# Patient Record
Sex: FEMALE | Race: WHITE | Employment: UNEMPLOYED | URBAN - METROPOLITAN AREA
[De-identification: names, ages, dates, MRNs, and addresses within clinical notes are randomized per-mention and may not be internally consistent; named-entity substitution may affect disease eponyms.]

---

## 2017-08-07 PROBLEM — I89.0 LYMPHEDEMA OF BOTH LOWER EXTREMITIES: Status: ACTIVE | Noted: 2017-08-07

## 2017-08-07 PROBLEM — B35.9 DERMATOPHYTOSIS: Status: ACTIVE | Noted: 2017-08-07

## 2017-08-07 PROBLEM — M79.89 LEG SWELLING: Status: ACTIVE | Noted: 2017-08-07

## 2017-08-07 PROBLEM — L03.116 CELLULITIS OF LEFT LEG: Status: ACTIVE | Noted: 2017-08-07

## 2018-09-06 RX ORDER — FLUTICASONE PROPIONATE 50 MCG
2 SPRAY, SUSPENSION (ML) NASAL DAILY PRN
COMMUNITY
End: 2022-09-21

## 2018-09-06 RX ORDER — FAMOTIDINE 20 MG/1
20 TABLET, FILM COATED ORAL DAILY
COMMUNITY

## 2018-09-06 RX ORDER — PHENYTOIN SODIUM 200 MG/1
400 CAPSULE, EXTENDED RELEASE ORAL EVERY OTHER DAY
COMMUNITY
End: 2018-10-10 | Stop reason: DRUGHIGH

## 2018-09-06 RX ORDER — PHENYTOIN SODIUM 300 MG/1
CAPSULE, EXTENDED RELEASE ORAL EVERY OTHER DAY
COMMUNITY
End: 2018-10-10

## 2018-09-06 RX ORDER — GUAIFENESIN/DEXTROMETHORPHAN 100-10MG/5
5 SYRUP ORAL EVERY 4 HOURS PRN
COMMUNITY
End: 2022-09-21

## 2018-09-14 ENCOUNTER — HOSPITAL ENCOUNTER (OUTPATIENT)
Age: 73
Setting detail: OUTPATIENT SURGERY
Discharge: OTHER INSTITUTION | End: 2018-09-14
Attending: OPHTHALMOLOGY | Admitting: OPHTHALMOLOGY
Payer: MEDICARE

## 2018-09-14 ENCOUNTER — ANESTHESIA EVENT (OUTPATIENT)
Dept: OPERATING ROOM | Age: 73
End: 2018-09-14
Payer: MEDICARE

## 2018-09-14 ENCOUNTER — ANESTHESIA (OUTPATIENT)
Dept: OPERATING ROOM | Age: 73
End: 2018-09-14
Payer: MEDICARE

## 2018-09-14 VITALS
DIASTOLIC BLOOD PRESSURE: 83 MMHG | OXYGEN SATURATION: 94 % | HEIGHT: 62 IN | RESPIRATION RATE: 16 BRPM | BODY MASS INDEX: 42.14 KG/M2 | TEMPERATURE: 98.2 F | HEART RATE: 73 BPM | SYSTOLIC BLOOD PRESSURE: 159 MMHG | WEIGHT: 229 LBS

## 2018-09-14 VITALS — SYSTOLIC BLOOD PRESSURE: 182 MMHG | DIASTOLIC BLOOD PRESSURE: 77 MMHG | OXYGEN SATURATION: 100 % | TEMPERATURE: 95.9 F

## 2018-09-14 PROCEDURE — 6360000002 HC RX W HCPCS: Performed by: OPHTHALMOLOGY

## 2018-09-14 PROCEDURE — 3600000002 HC SURGERY LEVEL 2 BASE: Performed by: OPHTHALMOLOGY

## 2018-09-14 PROCEDURE — 2500000003 HC RX 250 WO HCPCS: Performed by: NURSE ANESTHETIST, CERTIFIED REGISTERED

## 2018-09-14 PROCEDURE — 7100000001 HC PACU RECOVERY - ADDTL 15 MIN: Performed by: OPHTHALMOLOGY

## 2018-09-14 PROCEDURE — 7100000000 HC PACU RECOVERY - FIRST 15 MIN: Performed by: OPHTHALMOLOGY

## 2018-09-14 PROCEDURE — 6370000000 HC RX 637 (ALT 250 FOR IP): Performed by: OPHTHALMOLOGY

## 2018-09-14 PROCEDURE — 2500000003 HC RX 250 WO HCPCS: Performed by: OPHTHALMOLOGY

## 2018-09-14 PROCEDURE — 6370000000 HC RX 637 (ALT 250 FOR IP)

## 2018-09-14 PROCEDURE — 7100000011 HC PHASE II RECOVERY - ADDTL 15 MIN: Performed by: OPHTHALMOLOGY

## 2018-09-14 PROCEDURE — 3700000000 HC ANESTHESIA ATTENDED CARE: Performed by: OPHTHALMOLOGY

## 2018-09-14 PROCEDURE — 7100000010 HC PHASE II RECOVERY - FIRST 15 MIN: Performed by: OPHTHALMOLOGY

## 2018-09-14 PROCEDURE — 2709999900 HC NON-CHARGEABLE SUPPLY: Performed by: OPHTHALMOLOGY

## 2018-09-14 PROCEDURE — 2580000003 HC RX 258: Performed by: NURSE ANESTHETIST, CERTIFIED REGISTERED

## 2018-09-14 PROCEDURE — 3600000012 HC SURGERY LEVEL 2 ADDTL 15MIN: Performed by: OPHTHALMOLOGY

## 2018-09-14 PROCEDURE — V2632 POST CHMBR INTRAOCULAR LENS: HCPCS | Performed by: OPHTHALMOLOGY

## 2018-09-14 PROCEDURE — 6360000002 HC RX W HCPCS: Performed by: NURSE ANESTHETIST, CERTIFIED REGISTERED

## 2018-09-14 PROCEDURE — 3700000001 HC ADD 15 MINUTES (ANESTHESIA): Performed by: OPHTHALMOLOGY

## 2018-09-14 DEVICE — SINGLE-PIECE PMMA STERILE PCL (IOL/PC),12.5MM LENGTH, 7.0MM BICONVEX OPTIC,5-DEGREE "SLANT"(TM*) HAPTICS WITH EYELETS.                       *REG. U.S. PAT. & TM OFF.
Type: IMPLANTABLE DEVICE | Site: EYE | Status: FUNCTIONAL
Brand: ALCON SINGLE-PIECE PMMASLANT

## 2018-09-14 RX ORDER — PROPOFOL 10 MG/ML
INJECTION, EMULSION INTRAVENOUS PRN
Status: DISCONTINUED | OUTPATIENT
Start: 2018-09-14 | End: 2018-09-14 | Stop reason: SDUPTHER

## 2018-09-14 RX ORDER — PHENYLEPHRINE HCL 2.5 %
1 DROPS OPHTHALMIC (EYE)
Status: COMPLETED | OUTPATIENT
Start: 2018-09-14 | End: 2018-09-14

## 2018-09-14 RX ORDER — KETOROLAC TROMETHAMINE 5 MG/ML
1 SOLUTION OPHTHALMIC
Status: COMPLETED | OUTPATIENT
Start: 2018-09-14 | End: 2018-09-14

## 2018-09-14 RX ORDER — PREDNISOLONE ACETATE 10 MG/ML
SUSPENSION/ DROPS OPHTHALMIC PRN
Status: DISCONTINUED | OUTPATIENT
Start: 2018-09-14 | End: 2018-09-14 | Stop reason: HOSPADM

## 2018-09-14 RX ORDER — SODIUM CHLORIDE 9 MG/ML
INJECTION, SOLUTION INTRAVENOUS CONTINUOUS PRN
Status: DISCONTINUED | OUTPATIENT
Start: 2018-09-14 | End: 2018-09-14

## 2018-09-14 RX ORDER — FENTANYL CITRATE 50 UG/ML
INJECTION, SOLUTION INTRAMUSCULAR; INTRAVENOUS PRN
Status: DISCONTINUED | OUTPATIENT
Start: 2018-09-14 | End: 2018-09-14 | Stop reason: SDUPTHER

## 2018-09-14 RX ORDER — PREDNISOLONE ACETATE 10 MG/ML
1 SUSPENSION/ DROPS OPHTHALMIC
Status: DISCONTINUED | OUTPATIENT
Start: 2018-09-14 | End: 2018-09-14 | Stop reason: HOSPADM

## 2018-09-14 RX ORDER — LIDOCAINE HYDROCHLORIDE 20 MG/ML
INJECTION, SOLUTION INFILTRATION; PERINEURAL PRN
Status: DISCONTINUED | OUTPATIENT
Start: 2018-09-14 | End: 2018-09-14 | Stop reason: SDUPTHER

## 2018-09-14 RX ORDER — CYCLOPENTOLATE HYDROCHLORIDE 10 MG/ML
1 SOLUTION/ DROPS OPHTHALMIC
Status: COMPLETED | OUTPATIENT
Start: 2018-09-14 | End: 2018-09-14

## 2018-09-14 RX ORDER — DEXAMETHASONE SODIUM PHOSPHATE 4 MG/ML
INJECTION, SOLUTION INTRA-ARTICULAR; INTRALESIONAL; INTRAMUSCULAR; INTRAVENOUS; SOFT TISSUE PRN
Status: DISCONTINUED | OUTPATIENT
Start: 2018-09-14 | End: 2018-09-14 | Stop reason: SDUPTHER

## 2018-09-14 RX ORDER — ONDANSETRON 2 MG/ML
INJECTION INTRAMUSCULAR; INTRAVENOUS PRN
Status: DISCONTINUED | OUTPATIENT
Start: 2018-09-14 | End: 2018-09-14 | Stop reason: SDUPTHER

## 2018-09-14 RX ORDER — SUCCINYLCHOLINE CHLORIDE 20 MG/ML
INJECTION INTRAMUSCULAR; INTRAVENOUS PRN
Status: DISCONTINUED | OUTPATIENT
Start: 2018-09-14 | End: 2018-09-14 | Stop reason: SDUPTHER

## 2018-09-14 RX ORDER — MOXIFLOXACIN 5 MG/ML
1 SOLUTION/ DROPS OPHTHALMIC
Status: COMPLETED | OUTPATIENT
Start: 2018-09-14 | End: 2018-09-14

## 2018-09-14 RX ORDER — BRIMONIDINE TARTRATE 2 MG/ML
1 SOLUTION/ DROPS OPHTHALMIC EVERY 8 HOURS SCHEDULED
Status: DISCONTINUED | OUTPATIENT
Start: 2018-09-14 | End: 2018-09-14 | Stop reason: HOSPADM

## 2018-09-14 RX ORDER — MIDAZOLAM HYDROCHLORIDE 1 MG/ML
INJECTION INTRAMUSCULAR; INTRAVENOUS PRN
Status: DISCONTINUED | OUTPATIENT
Start: 2018-09-14 | End: 2018-09-14 | Stop reason: SDUPTHER

## 2018-09-14 RX ORDER — BRIMONIDINE TARTRATE 2 MG/ML
SOLUTION/ DROPS OPHTHALMIC PRN
Status: DISCONTINUED | OUTPATIENT
Start: 2018-09-14 | End: 2018-09-14 | Stop reason: HOSPADM

## 2018-09-14 RX ORDER — MOXIFLOXACIN 5 MG/ML
SOLUTION/ DROPS OPHTHALMIC PRN
Status: DISCONTINUED | OUTPATIENT
Start: 2018-09-14 | End: 2018-09-14 | Stop reason: HOSPADM

## 2018-09-14 RX ORDER — SODIUM CHLORIDE 9 MG/ML
INJECTION, SOLUTION INTRAVENOUS CONTINUOUS PRN
Status: DISCONTINUED | OUTPATIENT
Start: 2018-09-14 | End: 2018-09-14 | Stop reason: SDUPTHER

## 2018-09-14 RX ORDER — ROCURONIUM BROMIDE 10 MG/ML
INJECTION, SOLUTION INTRAVENOUS PRN
Status: DISCONTINUED | OUTPATIENT
Start: 2018-09-14 | End: 2018-09-14 | Stop reason: SDUPTHER

## 2018-09-14 RX ORDER — SODIUM CHLORIDE, SODIUM LACTATE, POTASSIUM CHLORIDE, CALCIUM CHLORIDE 600; 310; 30; 20 MG/100ML; MG/100ML; MG/100ML; MG/100ML
INJECTION, SOLUTION INTRAVENOUS CONTINUOUS
Status: DISCONTINUED | OUTPATIENT
Start: 2018-09-14 | End: 2018-09-14 | Stop reason: HOSPADM

## 2018-09-14 RX ORDER — GLYCOPYRROLATE 1 MG/5 ML
SYRINGE (ML) INTRAVENOUS PRN
Status: DISCONTINUED | OUTPATIENT
Start: 2018-09-14 | End: 2018-09-14 | Stop reason: SDUPTHER

## 2018-09-14 RX ADMIN — Medication 1 DROP: at 06:40

## 2018-09-14 RX ADMIN — SODIUM CHLORIDE: 9 INJECTION, SOLUTION INTRAVENOUS at 08:15

## 2018-09-14 RX ADMIN — Medication 1 DROP: at 06:49

## 2018-09-14 RX ADMIN — Medication 1 DROP: at 06:54

## 2018-09-14 RX ADMIN — FENTANYL CITRATE 25 MCG: 50 INJECTION, SOLUTION INTRAMUSCULAR; INTRAVENOUS at 07:47

## 2018-09-14 RX ADMIN — MIDAZOLAM HYDROCHLORIDE 1 MG: 1 INJECTION, SOLUTION INTRAMUSCULAR; INTRAVENOUS at 07:36

## 2018-09-14 RX ADMIN — SODIUM CHLORIDE: 9 INJECTION, SOLUTION INTRAVENOUS at 07:36

## 2018-09-14 RX ADMIN — PHENYLEPHRINE HYDROCHLORIDE 1 DROP: 25 SOLUTION/ DROPS OPHTHALMIC at 06:40

## 2018-09-14 RX ADMIN — PROPOFOL 110 MG: 10 INJECTION, EMULSION INTRAVENOUS at 07:42

## 2018-09-14 RX ADMIN — FENTANYL CITRATE 25 MCG: 50 INJECTION, SOLUTION INTRAMUSCULAR; INTRAVENOUS at 07:37

## 2018-09-14 RX ADMIN — Medication 120 MG: at 07:42

## 2018-09-14 RX ADMIN — ONDANSETRON HYDROCHLORIDE 4 MG: 2 INJECTION, SOLUTION INTRAMUSCULAR; INTRAVENOUS at 07:56

## 2018-09-14 RX ADMIN — DEXAMETHASONE SODIUM PHOSPHATE 8 MG: 4 INJECTION, SOLUTION INTRAMUSCULAR; INTRAVENOUS at 07:50

## 2018-09-14 RX ADMIN — Medication 0.2 MG: at 07:37

## 2018-09-14 RX ADMIN — PHENYLEPHRINE HYDROCHLORIDE 1 DROP: 25 SOLUTION/ DROPS OPHTHALMIC at 06:49

## 2018-09-14 RX ADMIN — ROCURONIUM BROMIDE 5 MG: 10 SOLUTION INTRAVENOUS at 07:42

## 2018-09-14 RX ADMIN — MIDAZOLAM HYDROCHLORIDE 1 MG: 1 INJECTION, SOLUTION INTRAMUSCULAR; INTRAVENOUS at 07:40

## 2018-09-14 RX ADMIN — FENTANYL CITRATE 50 MCG: 50 INJECTION, SOLUTION INTRAMUSCULAR; INTRAVENOUS at 07:42

## 2018-09-14 RX ADMIN — PHENYLEPHRINE HYDROCHLORIDE 1 DROP: 25 SOLUTION/ DROPS OPHTHALMIC at 06:54

## 2018-09-14 RX ADMIN — LIDOCAINE HYDROCHLORIDE 40 MG: 20 INJECTION, SOLUTION INFILTRATION; PERINEURAL at 07:42

## 2018-09-14 ASSESSMENT — PAIN DESCRIPTION - ORIENTATION
ORIENTATION: LEFT

## 2018-09-14 ASSESSMENT — PULMONARY FUNCTION TESTS
PIF_VALUE: 18
PIF_VALUE: 26
PIF_VALUE: 15
PIF_VALUE: 21
PIF_VALUE: 21
PIF_VALUE: 22
PIF_VALUE: 17
PIF_VALUE: 21
PIF_VALUE: 18
PIF_VALUE: 0
PIF_VALUE: 0
PIF_VALUE: 21
PIF_VALUE: 18
PIF_VALUE: 17
PIF_VALUE: 0
PIF_VALUE: 21
PIF_VALUE: 22
PIF_VALUE: 0
PIF_VALUE: 21
PIF_VALUE: 18
PIF_VALUE: 18
PIF_VALUE: 5
PIF_VALUE: 3
PIF_VALUE: 18
PIF_VALUE: 20
PIF_VALUE: 17
PIF_VALUE: 21
PIF_VALUE: 19
PIF_VALUE: 18
PIF_VALUE: 17
PIF_VALUE: 19
PIF_VALUE: 21
PIF_VALUE: 18
PIF_VALUE: 21
PIF_VALUE: 18
PIF_VALUE: 21
PIF_VALUE: 21
PIF_VALUE: 0
PIF_VALUE: 41
PIF_VALUE: 18
PIF_VALUE: 15
PIF_VALUE: 1
PIF_VALUE: 21
PIF_VALUE: 18
PIF_VALUE: 19
PIF_VALUE: 21
PIF_VALUE: 19
PIF_VALUE: 13
PIF_VALUE: 3
PIF_VALUE: 15
PIF_VALUE: 18
PIF_VALUE: 2
PIF_VALUE: 13
PIF_VALUE: 24
PIF_VALUE: 9
PIF_VALUE: 18

## 2018-09-14 ASSESSMENT — PAIN DESCRIPTION - LOCATION
LOCATION: EYE

## 2018-09-14 ASSESSMENT — PAIN DESCRIPTION - PAIN TYPE
TYPE: SURGICAL PAIN

## 2018-09-14 ASSESSMENT — PAIN SCALES - GENERAL
PAINLEVEL_OUTOF10: 0

## 2018-09-14 ASSESSMENT — PAIN - FUNCTIONAL ASSESSMENT: PAIN_FUNCTIONAL_ASSESSMENT: 0-10

## 2018-09-14 NOTE — PROGRESS NOTES
21  discharge instructions given to pt and her  and they verbalized understanding of instructions the report called to jose manuel ramirez , copy of all discharge instructions sent back to [facility] with  .   Called [facility] for transport back to [facility] and waiting for the arrival
[id number] adnitted to stage 2 pacu   here with pt   5581 pt assisted up to her wheel chair  As she requested and sr Amairani Mandel spoke to  post op
on the day of surgery    [] If not already done, you can expect a call from registration    [] You can expect a call the business day prior to procedure to notify you if your arrival time changes    [] If you receive a survey after surgery we would greatly appreciate your comments    [] Parent/guardian of a minor must accompany their child and remain on the premises  the entire time they are under our care     [] Pediatric patients may bring favorite toy, blanket or comfort item with them    [x] A caregiver or family member must remain with the patient during their stay if they are mentally handicapped, have dementia, disoriented or unable to use a call light or would be a safety concern if left unattended    [x] Please notify surgeon if you develop any illness between now and time of surgery (cold, cough, sore throat, fever, nausea, vomiting) or any signs of infections  including skin, wounds, and dental.    [] Other instructions    EDUCATIONAL MATERIALS PROVIDED:    [] PAT Preoperative Education Packet/Booklet     [] Medication List    [] Fluoroscopy Information Pamphlet    [] Transfusion bracelet applied with instructions    [] Joint replacement video reviewed    [] Shower with soap, lather and rinse well, and use CHG wipes provided the evening before surgery as instructed

## 2018-09-14 NOTE — ANESTHESIA PRE PROCEDURE
Department of Anesthesiology  Preprocedure Note       Name:  Keo Chou   Age:  74 y.o.  :  1945                                          MRN:  [id number]         Date:  2018      Surgeon: Leatha Osullivan):  Komal Sanchez MD    Procedure: Procedure(s):  LEFT EYE CATARACT EXTRACTION IOL IMPLANT  ++LATEX ALLERGY++    Medications prior to admission:   Prior to Admission medications    Medication Sig Start Date End Date Taking?  Authorizing Provider   phenytoin (PHENYTEK) 300 MG ER capsule Take by mouth every other day At HS, alternating with 400mg dose   Yes Historical Provider, MD   phenytoin (PHENYTEK) 200 MG ER capsule Take 400 mg by mouth every other day At HS, alternating with 300mg   Yes Historical Provider, MD   fluticasone (FLONASE) 50 MCG/ACT nasal spray 2 sprays by Each Nare route daily   Yes Historical Provider, MD   famotidine (PEPCID) 20 MG tablet Take 20 mg by mouth daily   Yes Historical Provider, MD   ARIPiprazole (ABILIFY) 5 MG tablet Take 5 mg by mouth daily   Yes Historical Provider, MD   chlorthalidone (HYGROTON) 25 MG tablet Take 25 mg by mouth daily   Yes Historical Provider, MD   carvedilol (COREG) 6.25 MG tablet Take 6.25 mg by mouth 2 times daily   Yes Historical Provider, MD   apixaban (ELIQUIS) 5 MG TABS tablet Take 5 mg by mouth 2 times daily   Yes Historical Provider, MD   lamoTRIgine (LAMICTAL) 100 MG tablet Take 100 mg by mouth 2 times daily   Yes Historical Provider, MD   losartan (COZAAR) 100 MG tablet Take 100 mg by mouth daily   Yes Historical Provider, MD   senna (SENOKOT) 8.6 MG tablet Take 2 tablets by mouth nightly    Yes Historical Provider, MD   montelukast (SINGULAIR) 10 MG tablet Take 10 mg by mouth nightly   Yes Historical Provider, MD   levothyroxine (SYNTHROID) 88 MCG tablet Take 188 mcg by mouth Daily   Yes Historical Provider, MD   Multiple Vitamins-Minerals (THERAPEUTIC MULTIVITAMIN-MINERALS) tablet Take 1 tablet by mouth daily   Yes Historical Provider, MD  Tape Jovany Goncalves Tape]      surgical       Problem List:    Patient Active Problem List   Diagnosis Code    Seizures ([facility] [address] R56.9    Lymphedema of both lower extremities I89.0    Leg swelling M79.89    Cellulitis of left leg L03. 80    Dermatophytosis B35.9       Past Medical History:        Diagnosis Date    Asthma     Autistic disorder, residual state     Cellulitis of left leg 8/7/2017    Chronic major depressive disorder, recurrent episode ([facility] [address]     Dermatophytosis 8/7/2017    Hx of blood clots     Hyperlipidemia     Hypertension     Hypothyroidism     Lymphedema of both lower extremities 8/7/2017    Mental retardation     mild    Obesity     Osteoarthritis     Seizures ([facility] [address]     none recent as of 9/18       Past Surgical History:        Procedure Laterality Date    APPENDECTOMY  1969    TONSILLECTOMY  age 8       Social History:    Social History   Substance Use Topics    Smoking status: Never Smoker    Smokeless tobacco: Never Used    Alcohol use No                                Counseling given: Not Answered      Vital Signs (Current):   Vitals:    09/06/18 1526 09/14/18 0618   BP:  (!) 168/69   Pulse:  60   Resp:  18   Temp:  97.7 °F (36.5 °C)   TempSrc:  Temporal   SpO2:  95%   Weight: 229 lb 6 oz (104 kg) 229 lb (103.9 kg)   Height: 5' 1.5\" (1.562 m) 5' 1.5\" (1.562 m)                                              BP Readings from Last 3 Encounters:   09/14/18 (!) 168/69   09/11/17 126/72   08/28/17 (!) 142/64       NPO Status: Time of last liquid consumption: 2030                        Time of last solid consumption: 2030                        Date of last liquid consumption: 09/13/18                        Date of last solid food consumption: 09/13/18    BMI:   Wt Readings from Last 3 Encounters:   09/14/18 229 lb (103.9 kg)   08/14/17 213 lb 2 oz (96.7 kg)   08/07/17 220 lb (99.8 kg)     Body mass index is 42.57 kg/m².     CBC: No results found for: WBC, RBC, HGB, HCT, MCV,

## 2018-10-02 RX ORDER — BRIMONIDINE TARTRATE 2 MG/ML
1 SOLUTION/ DROPS OPHTHALMIC EVERY 8 HOURS SCHEDULED
Status: CANCELLED | OUTPATIENT
Start: 2018-10-02

## 2018-10-02 RX ORDER — PREDNISOLONE ACETATE 10 MG/ML
1 SUSPENSION/ DROPS OPHTHALMIC
Status: CANCELLED | OUTPATIENT
Start: 2018-10-02

## 2018-10-10 RX ORDER — PHENYTOIN SODIUM 100 MG/1
400 CAPSULE, EXTENDED RELEASE ORAL NIGHTLY
COMMUNITY
End: 2022-09-21

## 2018-10-10 RX ORDER — KETOROLAC TROMETHAMINE 5 MG/ML
1 SOLUTION OPHTHALMIC 3 TIMES DAILY
Status: ON HOLD | COMMUNITY
End: 2019-09-20 | Stop reason: ALTCHOICE

## 2018-10-10 RX ORDER — DULOXETIN HYDROCHLORIDE 60 MG/1
60 CAPSULE, DELAYED RELEASE ORAL DAILY
COMMUNITY

## 2018-10-10 RX ORDER — CALCIUM CARBONATE/VITAMIN D3 500 MG-10
1 TABLET ORAL 2 TIMES DAILY
COMMUNITY
End: 2022-09-21

## 2018-10-10 RX ORDER — METHYLPREDNISOLONE 4 MG/1
4 TABLET ORAL 4 TIMES DAILY
Status: ON HOLD | COMMUNITY
End: 2019-09-23 | Stop reason: HOSPADM

## 2018-10-10 RX ORDER — OXYBUTYNIN CHLORIDE 5 MG/1
2.5 TABLET ORAL 2 TIMES DAILY
COMMUNITY

## 2018-10-10 RX ORDER — METOCLOPRAMIDE 5 MG/1
5 TABLET ORAL EVERY 6 HOURS PRN
COMMUNITY
End: 2022-09-21

## 2018-10-10 RX ORDER — LEVOTHYROXINE SODIUM 0.1 MG/1
100 TABLET ORAL DAILY
COMMUNITY

## 2018-10-10 NOTE — PROGRESS NOTES
[facility] PRE-ADMISSION TESTING INSTRUCTIONS    The Preadmission Testing patient is instructed accordingly using the following criteria (check applicable):    ARRIVAL INSTRUCTIONS:  [x] Parking the day of Surgery is located in the Main Entrance lot. Upon entering the door, make an immediate right to the surgery reception desk    [x] Complimentary [employer] [facility] is available Monday through Friday 6 am to 6 pm    [x] Bring photo ID and insurance card    [] Bring in a copy of Living will or Durable Power of  papers. [x] Please be sure to arrange for responsible adult to provide transportation to and from the hospital    [x] Please arrange for responsible adult to be with you for the 24 hour period post procedure due to having anesthesia      GENERAL INSTRUCTIONS:    [x] Nothing by mouth after midnight, including gum, candy, mints or water    [x] You may brush your teeth, but do not swallow any water    [x] Take medications as instructed with 1-2 oz of water    [x] Stop herbal supplements and vitamins 5 days prior to procedure    [x] Follow preop dosing of blood thinners per physician instructions    [] Take 1/2 dose of evening insulin, but no insulin after midnight    [] No oral diabetic medications after midnight    [] If diabetic and have low blood sugar or feel symptomatic, take 1-2oz apple juice only    [] Bring inhalers day of surgery    [] Bring C-PAP/ Bi-Pap day of surgery    [] Bring urine specimen day of surgery    [x] Shower or bath with soap, lather and rinse well, AM of Surgery, no lotion, powders or creams to surgical site    [] Follow bowel prep as instructed per surgeon    [x] No tobacco products within 24 hours of surgery     [x] No alcohol or illegal drug use within 24 hours of surgery.     [x] Jewelry, body piercing's, eyeglasses, contact lenses and dentures are not permitted into surgery (bring cases)      [x] Please do not wear any nail polish, make up or hair

## 2018-10-12 ENCOUNTER — HOSPITAL ENCOUNTER (OUTPATIENT)
Age: 73
Setting detail: OUTPATIENT SURGERY
Discharge: OTHER INSTITUTION | End: 2018-10-12
Attending: OPHTHALMOLOGY | Admitting: OPHTHALMOLOGY
Payer: MEDICARE

## 2018-10-12 ENCOUNTER — ANESTHESIA (OUTPATIENT)
Dept: OPERATING ROOM | Age: 73
End: 2018-10-12
Payer: MEDICARE

## 2018-10-12 ENCOUNTER — ANESTHESIA EVENT (OUTPATIENT)
Dept: OPERATING ROOM | Age: 73
End: 2018-10-12
Payer: MEDICARE

## 2018-10-12 VITALS
TEMPERATURE: 97.9 F | BODY MASS INDEX: 40.98 KG/M2 | HEIGHT: 62 IN | SYSTOLIC BLOOD PRESSURE: 145 MMHG | DIASTOLIC BLOOD PRESSURE: 72 MMHG | OXYGEN SATURATION: 97 % | RESPIRATION RATE: 16 BRPM | WEIGHT: 222.7 LBS | HEART RATE: 64 BPM

## 2018-10-12 VITALS — OXYGEN SATURATION: 100 % | DIASTOLIC BLOOD PRESSURE: 70 MMHG | SYSTOLIC BLOOD PRESSURE: 152 MMHG | TEMPERATURE: 93.4 F

## 2018-10-12 LAB — METER GLUCOSE: 99 MG/DL (ref 70–110)

## 2018-10-12 PROCEDURE — 2709999900 HC NON-CHARGEABLE SUPPLY: Performed by: OPHTHALMOLOGY

## 2018-10-12 PROCEDURE — 3600000012 HC SURGERY LEVEL 2 ADDTL 15MIN: Performed by: OPHTHALMOLOGY

## 2018-10-12 PROCEDURE — V2632 POST CHMBR INTRAOCULAR LENS: HCPCS | Performed by: OPHTHALMOLOGY

## 2018-10-12 PROCEDURE — 3700000001 HC ADD 15 MINUTES (ANESTHESIA): Performed by: OPHTHALMOLOGY

## 2018-10-12 PROCEDURE — 3700000000 HC ANESTHESIA ATTENDED CARE: Performed by: OPHTHALMOLOGY

## 2018-10-12 PROCEDURE — 6370000000 HC RX 637 (ALT 250 FOR IP)

## 2018-10-12 PROCEDURE — 3600000002 HC SURGERY LEVEL 2 BASE: Performed by: OPHTHALMOLOGY

## 2018-10-12 PROCEDURE — 2580000003 HC RX 258: Performed by: NURSE ANESTHETIST, CERTIFIED REGISTERED

## 2018-10-12 PROCEDURE — 6360000002 HC RX W HCPCS: Performed by: OPHTHALMOLOGY

## 2018-10-12 PROCEDURE — 6370000000 HC RX 637 (ALT 250 FOR IP): Performed by: ANESTHESIOLOGY

## 2018-10-12 PROCEDURE — 82962 GLUCOSE BLOOD TEST: CPT

## 2018-10-12 PROCEDURE — 2500000003 HC RX 250 WO HCPCS: Performed by: NURSE ANESTHETIST, CERTIFIED REGISTERED

## 2018-10-12 PROCEDURE — 7100000001 HC PACU RECOVERY - ADDTL 15 MIN: Performed by: OPHTHALMOLOGY

## 2018-10-12 PROCEDURE — 7100000010 HC PHASE II RECOVERY - FIRST 15 MIN: Performed by: OPHTHALMOLOGY

## 2018-10-12 PROCEDURE — 6360000002 HC RX W HCPCS: Performed by: NURSE ANESTHETIST, CERTIFIED REGISTERED

## 2018-10-12 PROCEDURE — 6370000000 HC RX 637 (ALT 250 FOR IP): Performed by: OPHTHALMOLOGY

## 2018-10-12 PROCEDURE — 2580000003 HC RX 258: Performed by: OPHTHALMOLOGY

## 2018-10-12 PROCEDURE — 7100000000 HC PACU RECOVERY - FIRST 15 MIN: Performed by: OPHTHALMOLOGY

## 2018-10-12 PROCEDURE — 7100000011 HC PHASE II RECOVERY - ADDTL 15 MIN: Performed by: OPHTHALMOLOGY

## 2018-10-12 PROCEDURE — 2500000003 HC RX 250 WO HCPCS: Performed by: OPHTHALMOLOGY

## 2018-10-12 DEVICE — LENS INTOCU +18.0 DIOPT L13MM DIA6MM 0DEG HAPTIC ANG A: Type: IMPLANTABLE DEVICE | Site: EYE | Status: FUNCTIONAL

## 2018-10-12 RX ORDER — BRIMONIDINE TARTRATE 2 MG/ML
SOLUTION/ DROPS OPHTHALMIC PRN
Status: DISCONTINUED | OUTPATIENT
Start: 2018-10-12 | End: 2018-10-12 | Stop reason: HOSPADM

## 2018-10-12 RX ORDER — PHENYLEPHRINE HCL 2.5 %
1 DROPS OPHTHALMIC (EYE)
Status: COMPLETED | OUTPATIENT
Start: 2018-10-12 | End: 2018-10-12

## 2018-10-12 RX ORDER — PREDNISOLONE ACETATE 10 MG/ML
SUSPENSION/ DROPS OPHTHALMIC PRN
Status: DISCONTINUED | OUTPATIENT
Start: 2018-10-12 | End: 2018-10-12 | Stop reason: HOSPADM

## 2018-10-12 RX ORDER — KETOROLAC TROMETHAMINE 5 MG/ML
SOLUTION OPHTHALMIC PRN
Status: DISCONTINUED | OUTPATIENT
Start: 2018-10-12 | End: 2018-10-12 | Stop reason: HOSPADM

## 2018-10-12 RX ORDER — DEXAMETHASONE SODIUM PHOSPHATE 4 MG/ML
INJECTION, SOLUTION INTRA-ARTICULAR; INTRALESIONAL; INTRAMUSCULAR; INTRAVENOUS; SOFT TISSUE PRN
Status: DISCONTINUED | OUTPATIENT
Start: 2018-10-12 | End: 2018-10-12 | Stop reason: SDUPTHER

## 2018-10-12 RX ORDER — SODIUM CHLORIDE, SODIUM LACTATE, POTASSIUM CHLORIDE, CALCIUM CHLORIDE 600; 310; 30; 20 MG/100ML; MG/100ML; MG/100ML; MG/100ML
INJECTION, SOLUTION INTRAVENOUS CONTINUOUS
Status: DISCONTINUED | OUTPATIENT
Start: 2018-10-12 | End: 2018-10-12 | Stop reason: HOSPADM

## 2018-10-12 RX ORDER — ACETAMINOPHEN 500 MG
1000 TABLET ORAL
Status: COMPLETED | OUTPATIENT
Start: 2018-10-12 | End: 2018-10-12

## 2018-10-12 RX ORDER — KETOROLAC TROMETHAMINE 5 MG/ML
1 SOLUTION OPHTHALMIC
Status: COMPLETED | OUTPATIENT
Start: 2018-10-12 | End: 2018-10-12

## 2018-10-12 RX ORDER — MOXIFLOXACIN 5 MG/ML
SOLUTION/ DROPS OPHTHALMIC PRN
Status: DISCONTINUED | OUTPATIENT
Start: 2018-10-12 | End: 2018-10-12 | Stop reason: HOSPADM

## 2018-10-12 RX ORDER — GLYCOPYRROLATE 0.2 MG/ML
INJECTION INTRAMUSCULAR; INTRAVENOUS PRN
Status: DISCONTINUED | OUTPATIENT
Start: 2018-10-12 | End: 2018-10-12 | Stop reason: SDUPTHER

## 2018-10-12 RX ORDER — MIDAZOLAM HYDROCHLORIDE 1 MG/ML
INJECTION INTRAMUSCULAR; INTRAVENOUS PRN
Status: DISCONTINUED | OUTPATIENT
Start: 2018-10-12 | End: 2018-10-12 | Stop reason: SDUPTHER

## 2018-10-12 RX ORDER — PHENYLEPHRINE HCL 2.5 %
DROPS OPHTHALMIC (EYE)
Status: COMPLETED
Start: 2018-10-12 | End: 2018-10-12

## 2018-10-12 RX ORDER — CYCLOPENTOLATE HYDROCHLORIDE 10 MG/ML
1 SOLUTION/ DROPS OPHTHALMIC
Status: COMPLETED | OUTPATIENT
Start: 2018-10-12 | End: 2018-10-12

## 2018-10-12 RX ORDER — MOXIFLOXACIN 5 MG/ML
1 SOLUTION/ DROPS OPHTHALMIC
Qty: 1 BOTTLE | Refills: 1 | Status: SHIPPED | OUTPATIENT
Start: 2018-10-12 | End: 2018-10-19

## 2018-10-12 RX ORDER — MOXIFLOXACIN 5 MG/ML
1 SOLUTION/ DROPS OPHTHALMIC
Status: COMPLETED | OUTPATIENT
Start: 2018-10-12 | End: 2018-10-12

## 2018-10-12 RX ORDER — LIDOCAINE HYDROCHLORIDE 20 MG/ML
INJECTION, SOLUTION INFILTRATION; PERINEURAL PRN
Status: DISCONTINUED | OUTPATIENT
Start: 2018-10-12 | End: 2018-10-12 | Stop reason: SDUPTHER

## 2018-10-12 RX ORDER — ONDANSETRON 2 MG/ML
INJECTION INTRAMUSCULAR; INTRAVENOUS PRN
Status: DISCONTINUED | OUTPATIENT
Start: 2018-10-12 | End: 2018-10-12 | Stop reason: SDUPTHER

## 2018-10-12 RX ORDER — SODIUM CHLORIDE, SODIUM LACTATE, POTASSIUM CHLORIDE, CALCIUM CHLORIDE 600; 310; 30; 20 MG/100ML; MG/100ML; MG/100ML; MG/100ML
INJECTION, SOLUTION INTRAVENOUS CONTINUOUS PRN
Status: DISCONTINUED | OUTPATIENT
Start: 2018-10-12 | End: 2018-10-12 | Stop reason: SDUPTHER

## 2018-10-12 RX ORDER — MOXIFLOXACIN 5 MG/ML
SOLUTION/ DROPS OPHTHALMIC
Status: COMPLETED
Start: 2018-10-12 | End: 2018-10-12

## 2018-10-12 RX ORDER — KETOROLAC TROMETHAMINE 5 MG/ML
SOLUTION OPHTHALMIC
Status: COMPLETED
Start: 2018-10-12 | End: 2018-10-12

## 2018-10-12 RX ORDER — PROPOFOL 10 MG/ML
INJECTION, EMULSION INTRAVENOUS PRN
Status: DISCONTINUED | OUTPATIENT
Start: 2018-10-12 | End: 2018-10-12 | Stop reason: SDUPTHER

## 2018-10-12 RX ORDER — CYCLOPENTOLATE HYDROCHLORIDE 10 MG/ML
SOLUTION/ DROPS OPHTHALMIC
Status: COMPLETED
Start: 2018-10-12 | End: 2018-10-12

## 2018-10-12 RX ORDER — FENTANYL CITRATE 50 UG/ML
INJECTION, SOLUTION INTRAMUSCULAR; INTRAVENOUS PRN
Status: DISCONTINUED | OUTPATIENT
Start: 2018-10-12 | End: 2018-10-12 | Stop reason: SDUPTHER

## 2018-10-12 RX ADMIN — MOXIFLOXACIN 1 DROP: 5 SOLUTION/ DROPS OPHTHALMIC at 07:59

## 2018-10-12 RX ADMIN — Medication 1 DROP: at 07:34

## 2018-10-12 RX ADMIN — GLYCOPYRROLATE 0.2 MG: 0.2 INJECTION, SOLUTION INTRAMUSCULAR; INTRAVENOUS at 08:10

## 2018-10-12 RX ADMIN — FENTANYL CITRATE 50 MCG: 50 INJECTION, SOLUTION INTRAMUSCULAR; INTRAVENOUS at 08:10

## 2018-10-12 RX ADMIN — PHENYLEPHRINE HYDROCHLORIDE 1 DROP: 25 SOLUTION/ DROPS OPHTHALMIC at 07:34

## 2018-10-12 RX ADMIN — Medication 1 DROP: at 07:59

## 2018-10-12 RX ADMIN — ACETAMINOPHEN 1000 MG: 500 TABLET, FILM COATED ORAL at 10:25

## 2018-10-12 RX ADMIN — CYCLOPENTOLATE HYDROCHLORIDE 1 DROP: 10 SOLUTION/ DROPS OPHTHALMIC at 07:47

## 2018-10-12 RX ADMIN — PROPOFOL 90 MG: 10 INJECTION, EMULSION INTRAVENOUS at 08:14

## 2018-10-12 RX ADMIN — KETOROLAC TROMETHAMINE 1 DROP: 5 SOLUTION OPHTHALMIC at 07:47

## 2018-10-12 RX ADMIN — MOXIFLOXACIN 1 DROP: 5 SOLUTION/ DROPS OPHTHALMIC at 07:34

## 2018-10-12 RX ADMIN — KETOROLAC TROMETHAMINE 1 DROP: 5 SOLUTION OPHTHALMIC at 08:00

## 2018-10-12 RX ADMIN — Medication 1 DROP: at 07:47

## 2018-10-12 RX ADMIN — KETOROLAC TROMETHAMINE 1 DROP: 5 SOLUTION OPHTHALMIC at 07:34

## 2018-10-12 RX ADMIN — MIDAZOLAM HYDROCHLORIDE 2 MG: 1 INJECTION, SOLUTION INTRAMUSCULAR; INTRAVENOUS at 08:10

## 2018-10-12 RX ADMIN — SODIUM CHLORIDE, POTASSIUM CHLORIDE, SODIUM LACTATE AND CALCIUM CHLORIDE: 600; 310; 30; 20 INJECTION, SOLUTION INTRAVENOUS at 07:35

## 2018-10-12 RX ADMIN — CYCLOPENTOLATE HYDROCHLORIDE 1 DROP: 10 SOLUTION/ DROPS OPHTHALMIC at 07:34

## 2018-10-12 RX ADMIN — SODIUM CHLORIDE, POTASSIUM CHLORIDE, SODIUM LACTATE AND CALCIUM CHLORIDE: 600; 310; 30; 20 INJECTION, SOLUTION INTRAVENOUS at 07:55

## 2018-10-12 RX ADMIN — DEXAMETHASONE SODIUM PHOSPHATE 8 MG: 4 INJECTION, SOLUTION INTRAMUSCULAR; INTRAVENOUS at 08:30

## 2018-10-12 RX ADMIN — CYCLOPENTOLATE HYDROCHLORIDE 1 DROP: 10 SOLUTION/ DROPS OPHTHALMIC at 08:00

## 2018-10-12 RX ADMIN — LIDOCAINE HYDROCHLORIDE 100 MG: 20 INJECTION, SOLUTION INFILTRATION; PERINEURAL at 08:14

## 2018-10-12 RX ADMIN — ONDANSETRON HYDROCHLORIDE 4 MG: 2 INJECTION, SOLUTION INTRAMUSCULAR; INTRAVENOUS at 08:14

## 2018-10-12 RX ADMIN — MOXIFLOXACIN 1 DROP: 5 SOLUTION/ DROPS OPHTHALMIC at 07:46

## 2018-10-12 ASSESSMENT — PULMONARY FUNCTION TESTS
PIF_VALUE: 1
PIF_VALUE: 22
PIF_VALUE: 22
PIF_VALUE: 16
PIF_VALUE: 24
PIF_VALUE: 20
PIF_VALUE: 2
PIF_VALUE: 16
PIF_VALUE: 2
PIF_VALUE: 23
PIF_VALUE: 2
PIF_VALUE: 0
PIF_VALUE: 2
PIF_VALUE: 10
PIF_VALUE: 24
PIF_VALUE: 23
PIF_VALUE: 11
PIF_VALUE: 23
PIF_VALUE: 22
PIF_VALUE: 10
PIF_VALUE: 1
PIF_VALUE: 0
PIF_VALUE: 24
PIF_VALUE: 16
PIF_VALUE: 1
PIF_VALUE: 25
PIF_VALUE: 12
PIF_VALUE: 2
PIF_VALUE: 10
PIF_VALUE: 26
PIF_VALUE: 2
PIF_VALUE: 16
PIF_VALUE: 10
PIF_VALUE: 2
PIF_VALUE: 16
PIF_VALUE: 10
PIF_VALUE: 20
PIF_VALUE: 23
PIF_VALUE: 0
PIF_VALUE: 20
PIF_VALUE: 24
PIF_VALUE: 29
PIF_VALUE: 24
PIF_VALUE: 1
PIF_VALUE: 23
PIF_VALUE: 21
PIF_VALUE: 24
PIF_VALUE: 16
PIF_VALUE: 24
PIF_VALUE: 2

## 2018-10-12 ASSESSMENT — PAIN DESCRIPTION - PAIN TYPE: TYPE: CHRONIC PAIN

## 2018-10-12 ASSESSMENT — PAIN SCALES - GENERAL
PAINLEVEL_OUTOF10: 0
PAINLEVEL_OUTOF10: 3
PAINLEVEL_OUTOF10: 0
PAINLEVEL_OUTOF10: 7

## 2018-10-12 ASSESSMENT — PAIN DESCRIPTION - PROGRESSION: CLINICAL_PROGRESSION: GRADUALLY IMPROVING

## 2018-10-12 ASSESSMENT — PAIN SCALES - WONG BAKER: WONGBAKER_NUMERICALRESPONSE: 6

## 2018-10-12 ASSESSMENT — PAIN DESCRIPTION - LOCATION
LOCATION: BACK
LOCATION: BACK;KNEE

## 2018-10-12 ASSESSMENT — PAIN - FUNCTIONAL ASSESSMENT: PAIN_FUNCTIONAL_ASSESSMENT: 0-10

## 2018-10-12 NOTE — ANESTHESIA PRE PROCEDURE
Department of Anesthesiology  Preprocedure Note       Name:  Manuel Santoro   Age:  74 y.o.  :  1945                                          MRN:  [id number]         Date:  10/12/2018      Surgeon: Jose Savage):  Jayne Lesch, MD    Procedure: Procedure(s):  RIGHT EYE CATARACT EXTRACTION IOL IMPLANT  ++LATEX ALLERGY++    Medications prior to admission:   Prior to Admission medications    Medication Sig Start Date End Date Taking?  Authorizing Provider   DULoxetine (CYMBALTA) 60 MG extended release capsule Take 60 mg by mouth daily Instructed to take am of procedure   Yes Historical Provider, MD   diclofenac sodium 1 % GEL Apply 2 g topically 4 times daily as needed for Pain   Yes Historical Provider, MD   phenytoin (DILANTIN) 100 MG ER capsule Take 300 mg by mouth nightly Alternate every other night with 400mg   Yes Historical Provider, MD   ketorolac (ACULAR) 0.5 % ophthalmic solution Place 1 drop into the left eye 3 times daily   Yes Historical Provider, MD   methylPREDNISolone (MEDROL) 4 MG tablet Take 4 mg by mouth 4 times daily Instructed to take am of procedure   Yes Historical Provider, MD   oxybutynin (DITROPAN) 5 MG tablet Take 2.5 mg by mouth 2 times daily   Yes Historical Provider, MD   Calcium Carb-Cholecalciferol (OYSTER SHELL CALCIUM) 500-400 MG-UNIT TABS Take 1 tablet by mouth 2 times daily Ld 10/10/2018   Yes Historical Provider, MD   metoclopramide (REGLAN) 5 MG tablet Take 5 mg by mouth every 8 hours as needed   Yes Historical Provider, MD   levothyroxine (SYNTHROID) 100 MCG tablet Take 100 mcg by mouth Daily   Yes Historical Provider, MD   fluticasone (FLONASE) 50 MCG/ACT nasal spray 2 sprays by Each Nare route daily   Yes Historical Provider, MD   famotidine (PEPCID) 20 MG tablet Take 20 mg by mouth daily Instructed to take am of procedure   Yes Historical Provider, MD   guaiFENesin-dextromethorphan (ROBITUSSIN DM) 100-10 MG/5ML syrup Take 5 mLs by mouth 3 times daily as needed for Cough Provider, MD   simvastatin (ZOCOR) 20 MG tablet Take 20 mg by mouth nightly   Yes Historical Provider, MD   magnesium hydroxide (MILK OF MAGNESIA) 400 MG/5ML suspension Take 30 mLs by mouth daily as needed. Yes Historical Provider, MD   ibuprofen (ADVIL;MOTRIN) 400 MG tablet Take 400 mg by mouth every 6 hours as needed. Yes Historical Provider, MD   acetaminophen (TYLENOL) 500 MG tablet Take 500 mg by mouth every 6 hours as needed. Yes Historical Provider, MD       Current medications:    Current Facility-Administered Medications   Medication Dose Route Frequency Provider Last Rate Last Dose    phenylephrine (MYDFRIN) 2.5 % ophthalmic solution 1 drop  1 drop Right Eye Q5 Susan Cruz MD   1 drop at 10/12/18 0734    cyclopentolate (CYCLOGYL) 1 % ophthalmic solution 1 drop  1 drop Right Eye Q5 Suasn Cruz MD   1 drop at 10/12/18 0734    ketorolac (ACULAR) 0.5 % ophthalmic solution 1 drop  1 drop Right Eye Q5 Susan Cruz MD   1 drop at 10/12/18 0734    lactated ringers infusion   Intravenous Continuous Jorge Luis Monroe  mL/hr at 10/12/18 0735      moxifloxacin (VIGAMOX) 0.5 % ophthalmic solution 1 drop  1 drop Right Eye Q15 Min PRN Jorge Luis Monroe MD   1 drop at 10/12/18 0734       Allergies: Allergies   Allergen Reactions    Latex     Cephalosporins     Clindamycin/Lincomycin     Duoderm Hydroactive [Hydroactive Dressings]     Pcn [Penicillins]     Tape Ok Patella Tape]      surgical       Problem List:    Patient Active Problem List   Diagnosis Code    Seizures ([facility] R56.9    Lymphedema of both lower extremities I89.0    Leg swelling M79.89    Cellulitis of left leg L03. 80    Dermatophytosis B35.9       Past Medical History:        Diagnosis Date    Asthma     Autistic disorder, residual state     Cellulitis of left leg 8/7/2017    Chronic major depressive disorder, recurrent episode ([facility] [address]     Dermatophytosis 8/7/2017    Hx of blood clots    

## 2019-01-12 ENCOUNTER — APPOINTMENT (OUTPATIENT)
Dept: GENERAL RADIOLOGY | Age: 74
End: 2019-01-12
Payer: MEDICARE

## 2019-01-12 ENCOUNTER — HOSPITAL ENCOUNTER (EMERGENCY)
Age: 74
Discharge: HOME | End: 2019-01-13
Attending: EMERGENCY MEDICINE
Payer: MEDICARE

## 2019-01-12 ENCOUNTER — APPOINTMENT (OUTPATIENT)
Dept: CT IMAGING | Age: 74
End: 2019-01-12
Payer: MEDICARE

## 2019-01-12 DIAGNOSIS — G40.919 BREAKTHROUGH SEIZURE (HCC): Primary | ICD-10-CM

## 2019-01-12 DIAGNOSIS — R07.89 RIGHT-SIDED CHEST WALL PAIN: ICD-10-CM

## 2019-01-12 LAB
ALBUMIN SERPL-MCNC: 4.2 G/DL (ref 3.5–5.2)
ALP BLD-CCNC: 105 U/L (ref 35–104)
ALT SERPL-CCNC: 26 U/L (ref 0–32)
ANION GAP SERPL CALCULATED.3IONS-SCNC: 12 MMOL/L (ref 7–16)
AST SERPL-CCNC: 30 U/L (ref 0–31)
BILIRUB SERPL-MCNC: <0.2 MG/DL (ref 0–1.2)
BILIRUBIN URINE: NEGATIVE
BLOOD, URINE: NEGATIVE
BUN BLDV-MCNC: 35 MG/DL (ref 8–23)
CALCIUM SERPL-MCNC: 9.1 MG/DL (ref 8.6–10.2)
CHLORIDE BLD-SCNC: 102 MMOL/L (ref 98–107)
CLARITY: CLEAR
CO2: 28 MMOL/L (ref 22–29)
COLOR: YELLOW
CREAT SERPL-MCNC: 1.2 MG/DL (ref 0.5–1)
GFR AFRICAN AMERICAN: 53
GFR NON-AFRICAN AMERICAN: 44 ML/MIN/1.73
GLUCOSE BLD-MCNC: 92 MG/DL (ref 74–99)
GLUCOSE URINE: NEGATIVE MG/DL
HCT VFR BLD CALC: 36.3 % (ref 34–48)
HEMOGLOBIN: 11.9 G/DL (ref 11.5–15.5)
KETONES, URINE: NEGATIVE MG/DL
LEUKOCYTE ESTERASE, URINE: NEGATIVE
MCH RBC QN AUTO: 32.3 PG (ref 26–35)
MCHC RBC AUTO-ENTMCNC: 32.8 % (ref 32–34.5)
MCV RBC AUTO: 98.6 FL (ref 80–99.9)
NITRITE, URINE: NEGATIVE
PDW BLD-RTO: 13.9 FL (ref 11.5–15)
PH UA: 6.5 (ref 5–9)
PHENYTOIN DOSE AMOUNT: NORMAL
PHENYTOIN LEVEL: 19.5 UG/ML (ref 10–20)
PLATELET # BLD: 231 E9/L (ref 130–450)
PMV BLD AUTO: 9.3 FL (ref 7–12)
POTASSIUM SERPL-SCNC: 3.8 MMOL/L (ref 3.5–5)
PROTEIN UA: NEGATIVE MG/DL
RBC # BLD: 3.68 E12/L (ref 3.5–5.5)
SODIUM BLD-SCNC: 142 MMOL/L (ref 132–146)
SPECIFIC GRAVITY UA: 1.01 (ref 1–1.03)
TOTAL PROTEIN: 8 G/DL (ref 6.4–8.3)
TROPONIN: <0.01 NG/ML (ref 0–0.03)
UROBILINOGEN, URINE: 0.2 E.U./DL
WBC # BLD: 14.7 E9/L (ref 4.5–11.5)

## 2019-01-12 PROCEDURE — 80185 ASSAY OF PHENYTOIN TOTAL: CPT

## 2019-01-12 PROCEDURE — 6370000000 HC RX 637 (ALT 250 FOR IP): Performed by: EMERGENCY MEDICINE

## 2019-01-12 PROCEDURE — 6360000002 HC RX W HCPCS: Performed by: EMERGENCY MEDICINE

## 2019-01-12 PROCEDURE — 73590 X-RAY EXAM OF LOWER LEG: CPT

## 2019-01-12 PROCEDURE — 80053 COMPREHEN METABOLIC PANEL: CPT

## 2019-01-12 PROCEDURE — 70450 CT HEAD/BRAIN W/O DYE: CPT

## 2019-01-12 PROCEDURE — 96375 TX/PRO/DX INJ NEW DRUG ADDON: CPT

## 2019-01-12 PROCEDURE — 96374 THER/PROPH/DIAG INJ IV PUSH: CPT

## 2019-01-12 PROCEDURE — 71250 CT THORAX DX C-: CPT

## 2019-01-12 PROCEDURE — 71045 X-RAY EXAM CHEST 1 VIEW: CPT

## 2019-01-12 PROCEDURE — 81003 URINALYSIS AUTO W/O SCOPE: CPT

## 2019-01-12 PROCEDURE — 85027 COMPLETE CBC AUTOMATED: CPT

## 2019-01-12 PROCEDURE — 72125 CT NECK SPINE W/O DYE: CPT

## 2019-01-12 PROCEDURE — 99284 EMERGENCY DEPT VISIT MOD MDM: CPT

## 2019-01-12 PROCEDURE — 84484 ASSAY OF TROPONIN QUANT: CPT

## 2019-01-12 RX ORDER — PHENYTOIN SODIUM 100 MG/1
100 CAPSULE, EXTENDED RELEASE ORAL ONCE
Status: COMPLETED | OUTPATIENT
Start: 2019-01-12 | End: 2019-01-12

## 2019-01-12 RX ORDER — LAMOTRIGINE 100 MG/1
100 TABLET ORAL ONCE
Status: COMPLETED | OUTPATIENT
Start: 2019-01-12 | End: 2019-01-13

## 2019-01-12 RX ORDER — MORPHINE SULFATE 2 MG/ML
2 INJECTION, SOLUTION INTRAMUSCULAR; INTRAVENOUS ONCE
Status: COMPLETED | OUTPATIENT
Start: 2019-01-12 | End: 2019-01-12

## 2019-01-12 RX ORDER — FENTANYL CITRATE 50 UG/ML
25 INJECTION, SOLUTION INTRAMUSCULAR; INTRAVENOUS ONCE
Status: COMPLETED | OUTPATIENT
Start: 2019-01-12 | End: 2019-01-12

## 2019-01-12 RX ADMIN — MORPHINE SULFATE 2 MG: 2 INJECTION, SOLUTION INTRAMUSCULAR; INTRAVENOUS at 23:28

## 2019-01-12 RX ADMIN — FENTANYL CITRATE 25 MCG: 50 INJECTION INTRAMUSCULAR; INTRAVENOUS at 21:02

## 2019-01-12 RX ADMIN — PHENYTOIN SODIUM 100 MG: 100 CAPSULE ORAL at 23:31

## 2019-01-12 ASSESSMENT — PAIN SCALES - GENERAL
PAINLEVEL_OUTOF10: 9
PAINLEVEL_OUTOF10: 8
PAINLEVEL_OUTOF10: 9

## 2019-01-12 ASSESSMENT — PAIN DESCRIPTION - DESCRIPTORS
DESCRIPTORS: SHARP
DESCRIPTORS: PATIENT UNABLE TO DESCRIBE

## 2019-01-12 ASSESSMENT — PAIN DESCRIPTION - LOCATION
LOCATION: RIB CAGE
LOCATION: OTHER (COMMENT)

## 2019-01-13 ENCOUNTER — APPOINTMENT (OUTPATIENT)
Dept: CT IMAGING | Age: 74
End: 2019-01-13
Payer: MEDICARE

## 2019-01-13 VITALS
DIASTOLIC BLOOD PRESSURE: 86 MMHG | WEIGHT: 222 LBS | TEMPERATURE: 97 F | RESPIRATION RATE: 16 BRPM | OXYGEN SATURATION: 98 % | HEART RATE: 81 BPM | HEIGHT: 62 IN | BODY MASS INDEX: 40.85 KG/M2 | SYSTOLIC BLOOD PRESSURE: 136 MMHG

## 2019-01-13 LAB
EKG ATRIAL RATE: 81 BPM
EKG P AXIS: 64 DEGREES
EKG P-R INTERVAL: 172 MS
EKG Q-T INTERVAL: 374 MS
EKG QRS DURATION: 78 MS
EKG QTC CALCULATION (BAZETT): 434 MS
EKG R AXIS: 17 DEGREES
EKG T AXIS: 8 DEGREES
EKG VENTRICULAR RATE: 81 BPM
LACTIC ACID: 0.7 MMOL/L (ref 0.5–2.2)

## 2019-01-13 PROCEDURE — 36415 COLL VENOUS BLD VENIPUNCTURE: CPT

## 2019-01-13 PROCEDURE — 70450 CT HEAD/BRAIN W/O DYE: CPT

## 2019-01-13 PROCEDURE — 6370000000 HC RX 637 (ALT 250 FOR IP): Performed by: EMERGENCY MEDICINE

## 2019-01-13 PROCEDURE — 83605 ASSAY OF LACTIC ACID: CPT

## 2019-01-13 PROCEDURE — 87040 BLOOD CULTURE FOR BACTERIA: CPT

## 2019-01-13 RX ADMIN — LAMOTRIGINE 100 MG: 100 TABLET ORAL at 01:24

## 2019-01-13 ASSESSMENT — PAIN SCALES - GENERAL: PAINLEVEL_OUTOF10: 9

## 2019-01-13 ASSESSMENT — PAIN DESCRIPTION - DESCRIPTORS: DESCRIPTORS: PATIENT UNABLE TO DESCRIBE

## 2019-01-13 ASSESSMENT — PAIN DESCRIPTION - LOCATION: LOCATION: OTHER (COMMENT)

## 2019-01-18 LAB
BLOOD CULTURE, ROUTINE: NORMAL
CULTURE, BLOOD 2: NORMAL

## 2019-07-07 ENCOUNTER — APPOINTMENT (OUTPATIENT)
Dept: CT IMAGING | Age: 74
End: 2019-07-07
Payer: MEDICARE

## 2019-07-07 ENCOUNTER — HOSPITAL ENCOUNTER (EMERGENCY)
Age: 74
Discharge: HOME | End: 2019-07-07
Payer: MEDICARE

## 2019-07-07 VITALS
RESPIRATION RATE: 18 BRPM | SYSTOLIC BLOOD PRESSURE: 162 MMHG | TEMPERATURE: 98.1 F | WEIGHT: 222 LBS | BODY MASS INDEX: 40.85 KG/M2 | DIASTOLIC BLOOD PRESSURE: 88 MMHG | OXYGEN SATURATION: 97 % | HEART RATE: 62 BPM | HEIGHT: 62 IN

## 2019-07-07 DIAGNOSIS — R41.82 ALTERED MENTAL STATUS, UNSPECIFIED ALTERED MENTAL STATUS TYPE: ICD-10-CM

## 2019-07-07 DIAGNOSIS — R10.84 GENERALIZED ABDOMINAL PAIN: ICD-10-CM

## 2019-07-07 DIAGNOSIS — R33.9 URINARY RETENTION: Primary | ICD-10-CM

## 2019-07-07 LAB
ALBUMIN SERPL-MCNC: 4.1 G/DL (ref 3.5–5.2)
ALP BLD-CCNC: 110 U/L (ref 35–104)
ALT SERPL-CCNC: 20 U/L (ref 0–32)
ANION GAP SERPL CALCULATED.3IONS-SCNC: 14 MMOL/L (ref 7–16)
AST SERPL-CCNC: 28 U/L (ref 0–31)
BASOPHILS ABSOLUTE: 0.02 E9/L (ref 0–0.2)
BASOPHILS RELATIVE PERCENT: 0.3 % (ref 0–2)
BILIRUB SERPL-MCNC: 0.2 MG/DL (ref 0–1.2)
BILIRUBIN URINE: NEGATIVE
BLOOD, URINE: NEGATIVE
BUN BLDV-MCNC: 25 MG/DL (ref 8–23)
CALCIUM SERPL-MCNC: 9.9 MG/DL (ref 8.6–10.2)
CHLORIDE BLD-SCNC: 102 MMOL/L (ref 98–107)
CLARITY: CLEAR
CO2: 26 MMOL/L (ref 22–29)
COLOR: YELLOW
CREAT SERPL-MCNC: 1.1 MG/DL (ref 0.5–1)
EKG ATRIAL RATE: 74 BPM
EKG P AXIS: 81 DEGREES
EKG P-R INTERVAL: 176 MS
EKG Q-T INTERVAL: 390 MS
EKG QRS DURATION: 84 MS
EKG QTC CALCULATION (BAZETT): 432 MS
EKG R AXIS: 48 DEGREES
EKG T AXIS: 51 DEGREES
EKG VENTRICULAR RATE: 74 BPM
EOSINOPHILS ABSOLUTE: 0.17 E9/L (ref 0.05–0.5)
EOSINOPHILS RELATIVE PERCENT: 2.7 % (ref 0–6)
GFR AFRICAN AMERICAN: 58
GFR NON-AFRICAN AMERICAN: 48 ML/MIN/1.73
GLUCOSE BLD-MCNC: 119 MG/DL (ref 74–99)
GLUCOSE URINE: NEGATIVE MG/DL
HCT VFR BLD CALC: 33.8 % (ref 34–48)
HEMOGLOBIN: 11.5 G/DL (ref 11.5–15.5)
IMMATURE GRANULOCYTES #: 0.02 E9/L
IMMATURE GRANULOCYTES %: 0.3 % (ref 0–5)
KETONES, URINE: NEGATIVE MG/DL
LACTIC ACID: 1.5 MMOL/L (ref 0.5–2.2)
LEUKOCYTE ESTERASE, URINE: NEGATIVE
LIPASE: 25 U/L (ref 13–60)
LYMPHOCYTES ABSOLUTE: 1.87 E9/L (ref 1.5–4)
LYMPHOCYTES RELATIVE PERCENT: 29.9 % (ref 20–42)
MAGNESIUM: 2.5 MG/DL (ref 1.6–2.6)
MCH RBC QN AUTO: 31.7 PG (ref 26–35)
MCHC RBC AUTO-ENTMCNC: 34 % (ref 32–34.5)
MCV RBC AUTO: 93.1 FL (ref 80–99.9)
MONOCYTES ABSOLUTE: 0.49 E9/L (ref 0.1–0.95)
MONOCYTES RELATIVE PERCENT: 7.8 % (ref 2–12)
NEUTROPHILS ABSOLUTE: 3.68 E9/L (ref 1.8–7.3)
NEUTROPHILS RELATIVE PERCENT: 59 % (ref 43–80)
NITRITE, URINE: NEGATIVE
PDW BLD-RTO: 13.1 FL (ref 11.5–15)
PH UA: 8 (ref 5–9)
PHENYTOIN DOSE AMOUNT: ABNORMAL
PHENYTOIN LEVEL: 20.6 UG/ML (ref 10–20)
PLATELET # BLD: 247 E9/L (ref 130–450)
PMV BLD AUTO: 9 FL (ref 7–12)
POTASSIUM SERPL-SCNC: 4.3 MMOL/L (ref 3.5–5)
PROTEIN UA: NEGATIVE MG/DL
RBC # BLD: 3.63 E12/L (ref 3.5–5.5)
SODIUM BLD-SCNC: 142 MMOL/L (ref 132–146)
SPECIFIC GRAVITY UA: 1.01 (ref 1–1.03)
TOTAL PROTEIN: 7.6 G/DL (ref 6.4–8.3)
TROPONIN: <0.01 NG/ML (ref 0–0.03)
UROBILINOGEN, URINE: 0.2 E.U./DL
WBC # BLD: 6.3 E9/L (ref 4.5–11.5)

## 2019-07-07 PROCEDURE — 96372 THER/PROPH/DIAG INJ SC/IM: CPT

## 2019-07-07 PROCEDURE — 96374 THER/PROPH/DIAG INJ IV PUSH: CPT

## 2019-07-07 PROCEDURE — 36415 COLL VENOUS BLD VENIPUNCTURE: CPT

## 2019-07-07 PROCEDURE — 81003 URINALYSIS AUTO W/O SCOPE: CPT

## 2019-07-07 PROCEDURE — 83690 ASSAY OF LIPASE: CPT

## 2019-07-07 PROCEDURE — 6360000004 HC RX CONTRAST MEDICATION: Performed by: RADIOLOGY

## 2019-07-07 PROCEDURE — 80175 DRUG SCREEN QUAN LAMOTRIGINE: CPT

## 2019-07-07 PROCEDURE — 80053 COMPREHEN METABOLIC PANEL: CPT

## 2019-07-07 PROCEDURE — 93010 ELECTROCARDIOGRAM REPORT: CPT | Performed by: INTERNAL MEDICINE

## 2019-07-07 PROCEDURE — 6360000002 HC RX W HCPCS

## 2019-07-07 PROCEDURE — 83735 ASSAY OF MAGNESIUM: CPT

## 2019-07-07 PROCEDURE — 87088 URINE BACTERIA CULTURE: CPT

## 2019-07-07 PROCEDURE — 84484 ASSAY OF TROPONIN QUANT: CPT

## 2019-07-07 PROCEDURE — 70450 CT HEAD/BRAIN W/O DYE: CPT

## 2019-07-07 PROCEDURE — 74177 CT ABD & PELVIS W/CONTRAST: CPT

## 2019-07-07 PROCEDURE — 6360000002 HC RX W HCPCS: Performed by: NURSE PRACTITIONER

## 2019-07-07 PROCEDURE — 80185 ASSAY OF PHENYTOIN TOTAL: CPT

## 2019-07-07 PROCEDURE — 83605 ASSAY OF LACTIC ACID: CPT

## 2019-07-07 PROCEDURE — 85025 COMPLETE CBC W/AUTO DIFF WBC: CPT

## 2019-07-07 PROCEDURE — 93005 ELECTROCARDIOGRAM TRACING: CPT | Performed by: NURSE PRACTITIONER

## 2019-07-07 PROCEDURE — 99285 EMERGENCY DEPT VISIT HI MDM: CPT

## 2019-07-07 RX ORDER — ZIPRASIDONE MESYLATE 20 MG/ML
20 INJECTION, POWDER, LYOPHILIZED, FOR SOLUTION INTRAMUSCULAR ONCE
Status: COMPLETED | OUTPATIENT
Start: 2019-07-07 | End: 2019-07-07

## 2019-07-07 RX ORDER — FENTANYL CITRATE 50 UG/ML
25 INJECTION, SOLUTION INTRAMUSCULAR; INTRAVENOUS ONCE
Status: DISCONTINUED | OUTPATIENT
Start: 2019-07-07 | End: 2019-07-07

## 2019-07-07 RX ORDER — LORAZEPAM 2 MG/ML
2 INJECTION INTRAMUSCULAR ONCE
Status: COMPLETED | OUTPATIENT
Start: 2019-07-07 | End: 2019-07-07

## 2019-07-07 RX ORDER — ZIPRASIDONE MESYLATE 20 MG/ML
INJECTION, POWDER, LYOPHILIZED, FOR SOLUTION INTRAMUSCULAR
Status: DISCONTINUED
Start: 2019-07-07 | End: 2019-07-07 | Stop reason: HOSPADM

## 2019-07-07 RX ADMIN — IOPAMIDOL 110 ML: 755 INJECTION, SOLUTION INTRAVENOUS at 05:30

## 2019-07-07 RX ADMIN — LORAZEPAM 2 MG: 2 INJECTION INTRAMUSCULAR; INTRAVENOUS at 05:00

## 2019-07-07 RX ADMIN — ZIPRASIDONE MESYLATE 20 MG: 20 INJECTION, POWDER, LYOPHILIZED, FOR SOLUTION INTRAMUSCULAR at 02:34

## 2019-07-07 NOTE — ED NOTES
Gave nurse to nurse to facility at [place] [facility]. Patient going back with yun cath in place.      Daphne Mendoza RN  07/07/19 8580

## 2019-07-07 NOTE — DISCHARGE INSTR - COC
Continuity of Care Form    Patient Name: Esme Benavides   :  1945  MRN:  [id number]    Admit date:  2019  Discharge date:  ***    Code Status Order: Prior   Advance Directives:     Admitting Physician:  No admitting provider for patient encounter. PCP: Jenna Adams DO    Discharging Nurse: [facility] Unit/Room#:   Discharging Unit Phone Number: ***    Emergency Contact:   Extended Emergency Contact Information  Primary Emergency Contact: Roly Soares   [address] Phone: [phone]  Mobile Phone: [phone]  Relation: Brother/Sister  Secondary Emergency Contact: Caesar Goetz[address] Phone: [phone]  Relation: Aunt/Uncle    Past Surgical History:  Past Surgical History:   Procedure Laterality Date    APPENDECTOMY      MS CORNEAL TRANSPLANT,ENDOTHELIAL Left 2018    LEFT EYE CATARACT EXTRACTION IOL IMPLANT  ++LATEX ALLERGY++ performed by Ewa Covington MD at [facility] Right 10/12/2018    RIGHT EYE CATARACT EXTRACTION IOL IMPLANT  ++LATEX ALLERGY++ performed by Ewa Covington MD at [facility] TONSILLECTOMY  age 8       Immunization History: There is no immunization history on file for this patient. Active Problems:  Patient Active Problem List   Diagnosis Code    Seizures ([facility] [address] R56.9    Lymphedema of both lower extremities I89.0    Leg swelling M79.89    Cellulitis of left leg L03. 116    Dermatophytosis B35.9       Isolation/Infection:   Isolation          No Isolation            Nurse Assessment:  Last Vital Signs: BP (!) 161/80   Pulse 71   Temp 98 °F (36.7 °C)   Resp 12   Ht 5' 1.5\" (1.562 m)   Wt 222 lb (100.7 kg)   SpO2 99%   BMI 41.27 kg/m²     Last documented pain score (0-10 scale):    Last Weight:   Wt Readings from Last 1 Encounters:   19 222 lb (100.7 kg)     Mental Status:  {IP PT MENTAL STATUS:55284}    IV Access:  {[facility] IV

## 2019-07-07 NOTE — ED PROVIDER NOTES
Independent   HPI:  7/7/19, Time: 12:51 AM         Tessie Holguin is a 66 y.o. female presenting to the ED for nursing home concerns regarding change in mental status as well as complaints of her stating that her abdomen and shoulder hurt. No injuries noted. On my arrival here patient is awake alert oriented x3 she is well aware of what her name is, where she is as well as the president. She denies any complaints of abdominal pain as well as no recent fevers. She also denies any fevers as well as no noted chest pain, shortness of breath or any recent falls or injuries. She denies any actual pain now to her right shoulder but states that it always hurts because it is chronic arthritis. Patient otherwise nontoxic but has been on the call light frequently for frequent urination. Review of Systems:   Pertinent positives and negatives are stated within HPI, all other systems reviewed and are negative.          --------------------------------------------- PAST HISTORY ---------------------------------------------  Past Medical History:  has a past medical history of Asthma, Autistic disorder, residual state, Cellulitis of left leg, Chronic major depressive disorder, recurrent episode ([address] Dermatophytosis, Hx of blood clots, Hyperlipidemia, Hypertension, Hypothyroidism, Lymphedema of both lower extremities, Mental retardation, Obesity, Osteoarthritis, and Seizures ([address] Past Surgical History:  has a past surgical history that includes Tonsillectomy (age 8); Appendectomy (1969); pr corneal transplant,endothelial (Left, 9/14/2018); and pr remv cataract extracap,insert lens (Right, 10/12/2018). Social History:  reports that she has never smoked. She has never used smokeless tobacco. She reports that she does not drink alcohol or use drugs. Family History: family history is not on file. The patients home medications have been reviewed.     Allergies: Latex; Cephalosporins; Clindamycin/lincomycin; unremarkable. Frank catheter was inserted by nursing and patient with immediate return of 700 mL's of urine, patient likely uncomfortable due to urinary retention. will obtain CT scan of the brain and abdomen and if negative plan will be to discharge patient home back to nursing home with diagnosis of urinary retention, will also keep Frank catheter in. Currently awaiting CT scan of the abdomen as well as brain anticipate if negative patient will be discharged back to nursing home with Frank catheter in place diagnosis urinary retention. Anticipate discharge back to nursing home after negative CTs. Nontoxic. Patient resting much more comfortably. Vitals 163/80, heart rate 59, temp 98.0, respiratory rate 16, pulse ox 96%. Counseling: The emergency provider has spoken with the patient and discussed todays results, in addition to providing specific details for the plan of care and counseling regarding the diagnosis and prognosis. Questions are answered at this time and they are agreeable with the plan.      --------------------------------- IMPRESSION AND DISPOSITION ---------------------------------    IMPRESSION  1. Urinary retention    2. Altered mental status, unspecified altered mental status type    3. Generalized abdominal pain        DISPOSITION  Disposition: Discharge to nursing home  Patient condition is good      NOTE: This report was transcribed using voice recognition software.  Every effort was made to ensure accuracy; however, inadvertent computerized transcription errors may be present     SIMONE Goddard CNP  07/07/19 0530       SIMONE Goddard CNP  07/07/19 0696

## 2019-07-08 LAB — URINE CULTURE, ROUTINE: NORMAL

## 2019-07-09 LAB — LAMOTRIGINE LEVEL: 3.9 UG/ML (ref 2.5–15)

## 2019-09-20 ENCOUNTER — APPOINTMENT (OUTPATIENT)
Dept: GENERAL RADIOLOGY | Age: 74
DRG: 871 | End: 2019-09-20
Payer: MEDICARE

## 2019-09-20 ENCOUNTER — APPOINTMENT (OUTPATIENT)
Dept: ULTRASOUND IMAGING | Age: 74
DRG: 871 | End: 2019-09-20
Payer: MEDICARE

## 2019-09-20 ENCOUNTER — HOSPITAL ENCOUNTER (INPATIENT)
Age: 74
LOS: 5 days | Discharge: NURSING, REHAB OR LONG-TERM CARE | DRG: 871 | End: 2019-09-25
Attending: EMERGENCY MEDICINE | Admitting: HOSPITALIST
Payer: MEDICARE

## 2019-09-20 DIAGNOSIS — N18.9 ACUTE KIDNEY INJURY SUPERIMPOSED ON CHRONIC KIDNEY DISEASE (HCC): ICD-10-CM

## 2019-09-20 DIAGNOSIS — N17.9 ACUTE RENAL FAILURE, UNSPECIFIED ACUTE RENAL FAILURE TYPE (HCC): ICD-10-CM

## 2019-09-20 DIAGNOSIS — M25.559 ARTHRALGIA OF HIP, UNSPECIFIED LATERALITY: Primary | ICD-10-CM

## 2019-09-20 DIAGNOSIS — N17.9 ACUTE KIDNEY INJURY SUPERIMPOSED ON CHRONIC KIDNEY DISEASE (HCC): ICD-10-CM

## 2019-09-20 PROBLEM — M25.552 PAIN OF BOTH HIP JOINTS: Status: ACTIVE | Noted: 2019-09-20

## 2019-09-20 PROBLEM — Z79.01 CHRONIC ANTICOAGULATION: Status: ACTIVE | Noted: 2019-09-20
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
## 2019-09-20 PROBLEM — E66.9 OBESITY (BMI 30-39.9): Status: ACTIVE | Noted: 2019-09-20

## 2019-09-20 PROBLEM — D64.9 ANEMIA: Status: ACTIVE | Noted: 2019-09-20

## 2019-09-20 PROBLEM — F84.0 AUTISTIC DISORDER: Status: ACTIVE | Noted: 2019-09-20

## 2019-09-20 PROBLEM — D72.829 LEUKOCYTOSIS: Status: ACTIVE | Noted: 2019-09-20

## 2019-09-20 PROBLEM — Z86.718 HISTORY OF DVT (DEEP VEIN THROMBOSIS): Status: ACTIVE | Noted: 2019-09-20

## 2019-09-20 PROBLEM — M25.551 PAIN OF BOTH HIP JOINTS: Status: ACTIVE | Noted: 2019-09-20

## 2019-09-20 PROBLEM — R77.8 ELEVATED TROPONIN: Status: ACTIVE | Noted: 2019-09-20

## 2019-09-20 PROBLEM — A41.9 SEPSIS (HCC): Status: ACTIVE | Noted: 2019-09-20

## 2019-09-20 PROBLEM — R79.89 ELEVATED TROPONIN: Status: ACTIVE | Noted: 2019-09-20

## 2019-09-20 LAB
ALBUMIN SERPL-MCNC: 3.3 G/DL (ref 3.5–5.2)
ALP BLD-CCNC: 102 U/L (ref 35–104)
ALT SERPL-CCNC: 19 U/L (ref 0–32)
ANION GAP SERPL CALCULATED.3IONS-SCNC: 18 MMOL/L (ref 7–16)
ANION GAP SERPL CALCULATED.3IONS-SCNC: 20 MMOL/L (ref 7–16)
AST SERPL-CCNC: 34 U/L (ref 0–31)
BILIRUB SERPL-MCNC: 0.5 MG/DL (ref 0–1.2)
BUN BLDV-MCNC: 59 MG/DL (ref 8–23)
BUN BLDV-MCNC: 68 MG/DL (ref 8–23)
CALCIUM SERPL-MCNC: 8.8 MG/DL (ref 8.6–10.2)
CALCIUM SERPL-MCNC: 9 MG/DL (ref 8.6–10.2)
CHLORIDE BLD-SCNC: 92 MMOL/L (ref 98–107)
CHLORIDE BLD-SCNC: 93 MMOL/L (ref 98–107)
CO2: 18 MMOL/L (ref 22–29)
CO2: 22 MMOL/L (ref 22–29)
CREAT SERPL-MCNC: 3.4 MG/DL (ref 0.5–1)
CREAT SERPL-MCNC: 3.5 MG/DL (ref 0.5–1)
EKG ATRIAL RATE: 70 BPM
EKG P AXIS: 69 DEGREES
EKG P-R INTERVAL: 176 MS
EKG Q-T INTERVAL: 418 MS
EKG QRS DURATION: 88 MS
EKG QTC CALCULATION (BAZETT): 451 MS
EKG R AXIS: 9 DEGREES
EKG T AXIS: 24 DEGREES
EKG VENTRICULAR RATE: 70 BPM
GFR AFRICAN AMERICAN: 15
GFR AFRICAN AMERICAN: 16
GFR NON-AFRICAN AMERICAN: 13 ML/MIN/1.73
GFR NON-AFRICAN AMERICAN: 13 ML/MIN/1.73
GLUCOSE BLD-MCNC: 122 MG/DL (ref 74–99)
GLUCOSE BLD-MCNC: 139 MG/DL (ref 74–99)
HCT VFR BLD CALC: 31.8 % (ref 34–48)
HEMOGLOBIN: 10.7 G/DL (ref 11.5–15.5)
LACTIC ACID, SEPSIS: 1.3 MMOL/L (ref 0.5–1.9)
LACTIC ACID: 2 MMOL/L (ref 0.5–2.2)
MCH RBC QN AUTO: 32.4 PG (ref 26–35)
MCHC RBC AUTO-ENTMCNC: 33.6 % (ref 32–34.5)
MCV RBC AUTO: 96.4 FL (ref 80–99.9)
PDW BLD-RTO: 13.6 FL (ref 11.5–15)
PHENYTOIN DOSE AMOUNT: NORMAL
PHENYTOIN LEVEL: 20 UG/ML (ref 10–20)
PLATELET # BLD: 216 E9/L (ref 130–450)
PMV BLD AUTO: 9.7 FL (ref 7–12)
POTASSIUM SERPL-SCNC: 4.1 MMOL/L (ref 3.5–5)
POTASSIUM SERPL-SCNC: 4.4 MMOL/L (ref 3.5–5)
RBC # BLD: 3.3 E12/L (ref 3.5–5.5)
SODIUM BLD-SCNC: 131 MMOL/L (ref 132–146)
SODIUM BLD-SCNC: 132 MMOL/L (ref 132–146)
TOTAL CK: 165 U/L (ref 20–180)
TOTAL PROTEIN: 7.9 G/DL (ref 6.4–8.3)
TROPONIN: 0.03 NG/ML (ref 0–0.03)
TROPONIN: 0.04 NG/ML (ref 0–0.03)
WBC # BLD: 27 E9/L (ref 4.5–11.5)

## 2019-09-20 PROCEDURE — 2580000003 HC RX 258: Performed by: SPECIALIST

## 2019-09-20 PROCEDURE — 76770 US EXAM ABDO BACK WALL COMP: CPT

## 2019-09-20 PROCEDURE — 85027 COMPLETE CBC AUTOMATED: CPT

## 2019-09-20 PROCEDURE — 87077 CULTURE AEROBIC IDENTIFY: CPT

## 2019-09-20 PROCEDURE — 73502 X-RAY EXAM HIP UNI 2-3 VIEWS: CPT

## 2019-09-20 PROCEDURE — 84484 ASSAY OF TROPONIN QUANT: CPT

## 2019-09-20 PROCEDURE — 80185 ASSAY OF PHENYTOIN TOTAL: CPT

## 2019-09-20 PROCEDURE — 73610 X-RAY EXAM OF ANKLE: CPT

## 2019-09-20 PROCEDURE — 71045 X-RAY EXAM CHEST 1 VIEW: CPT

## 2019-09-20 PROCEDURE — 6360000002 HC RX W HCPCS: Performed by: EMERGENCY MEDICINE

## 2019-09-20 PROCEDURE — 2580000003 HC RX 258: Performed by: EMERGENCY MEDICINE

## 2019-09-20 PROCEDURE — 6360000002 HC RX W HCPCS: Performed by: SPECIALIST

## 2019-09-20 PROCEDURE — 99285 EMERGENCY DEPT VISIT HI MDM: CPT

## 2019-09-20 PROCEDURE — 51798 US URINE CAPACITY MEASURE: CPT

## 2019-09-20 PROCEDURE — 73562 X-RAY EXAM OF KNEE 3: CPT

## 2019-09-20 PROCEDURE — 93005 ELECTROCARDIOGRAM TRACING: CPT | Performed by: EMERGENCY MEDICINE

## 2019-09-20 PROCEDURE — 87040 BLOOD CULTURE FOR BACTERIA: CPT

## 2019-09-20 PROCEDURE — 87186 SC STD MICRODIL/AGAR DIL: CPT

## 2019-09-20 PROCEDURE — 2060000000 HC ICU INTERMEDIATE R&B

## 2019-09-20 PROCEDURE — 6370000000 HC RX 637 (ALT 250 FOR IP): Performed by: FAMILY MEDICINE

## 2019-09-20 PROCEDURE — 80053 COMPREHEN METABOLIC PANEL: CPT

## 2019-09-20 PROCEDURE — 82550 ASSAY OF CK (CPK): CPT

## 2019-09-20 PROCEDURE — 87070 CULTURE OTHR SPECIMN AEROBIC: CPT

## 2019-09-20 PROCEDURE — 36415 COLL VENOUS BLD VENIPUNCTURE: CPT

## 2019-09-20 PROCEDURE — 80048 BASIC METABOLIC PNL TOTAL CA: CPT

## 2019-09-20 PROCEDURE — 83605 ASSAY OF LACTIC ACID: CPT

## 2019-09-20 PROCEDURE — 93010 ELECTROCARDIOGRAM REPORT: CPT | Performed by: INTERNAL MEDICINE

## 2019-09-20 PROCEDURE — 2580000003 HC RX 258: Performed by: FAMILY MEDICINE

## 2019-09-20 RX ORDER — KETOROLAC TROMETHAMINE 5 MG/ML
1 SOLUTION OPHTHALMIC 3 TIMES DAILY
Status: DISCONTINUED | OUTPATIENT
Start: 2019-09-20 | End: 2019-09-20

## 2019-09-20 RX ORDER — MAGNESIUM SULFATE 1 G/100ML
1 INJECTION INTRAVENOUS PRN
Status: DISCONTINUED | OUTPATIENT
Start: 2019-09-20 | End: 2019-09-25 | Stop reason: HOSPADM

## 2019-09-20 RX ORDER — SIMVASTATIN 20 MG
20 TABLET ORAL NIGHTLY
Status: DISCONTINUED | OUTPATIENT
Start: 2019-09-20 | End: 2019-09-25 | Stop reason: HOSPADM

## 2019-09-20 RX ORDER — ONDANSETRON 2 MG/ML
4 INJECTION INTRAMUSCULAR; INTRAVENOUS ONCE
Status: COMPLETED | OUTPATIENT
Start: 2019-09-20 | End: 2019-09-20

## 2019-09-20 RX ORDER — ARIPIPRAZOLE 5 MG/1
5 TABLET ORAL NIGHTLY
Status: DISCONTINUED | OUTPATIENT
Start: 2019-09-20 | End: 2019-09-25 | Stop reason: HOSPADM

## 2019-09-20 RX ORDER — CYCLOBENZAPRINE HCL 10 MG
10 TABLET ORAL 3 TIMES DAILY PRN
Status: DISCONTINUED | OUTPATIENT
Start: 2019-09-20 | End: 2019-09-25 | Stop reason: HOSPADM

## 2019-09-20 RX ORDER — SODIUM CHLORIDE 9 MG/ML
INJECTION, SOLUTION INTRAVENOUS CONTINUOUS
Status: DISCONTINUED | OUTPATIENT
Start: 2019-09-20 | End: 2019-09-23

## 2019-09-20 RX ORDER — ACETAMINOPHEN 500 MG
500 TABLET ORAL EVERY 6 HOURS PRN
Status: DISCONTINUED | OUTPATIENT
Start: 2019-09-20 | End: 2019-09-25 | Stop reason: HOSPADM

## 2019-09-20 RX ORDER — OXYBUTYNIN CHLORIDE 5 MG/1
2.5 TABLET ORAL 2 TIMES DAILY
Status: DISCONTINUED | OUTPATIENT
Start: 2019-09-20 | End: 2019-09-25 | Stop reason: HOSPADM

## 2019-09-20 RX ORDER — MORPHINE SULFATE 2 MG/ML
2 INJECTION, SOLUTION INTRAMUSCULAR; INTRAVENOUS ONCE
Status: COMPLETED | OUTPATIENT
Start: 2019-09-20 | End: 2019-09-20

## 2019-09-20 RX ORDER — LAMOTRIGINE 100 MG/1
100 TABLET ORAL 2 TIMES DAILY
Status: DISCONTINUED | OUTPATIENT
Start: 2019-09-20 | End: 2019-09-25 | Stop reason: HOSPADM

## 2019-09-20 RX ORDER — LEVOTHYROXINE SODIUM 0.1 MG/1
100 TABLET ORAL DAILY
Status: DISCONTINUED | OUTPATIENT
Start: 2019-09-20 | End: 2019-09-20 | Stop reason: SDUPTHER

## 2019-09-20 RX ORDER — SODIUM CHLORIDE 0.9 % (FLUSH) 0.9 %
10 SYRINGE (ML) INJECTION PRN
Status: DISCONTINUED | OUTPATIENT
Start: 2019-09-20 | End: 2019-09-25 | Stop reason: HOSPADM

## 2019-09-20 RX ORDER — SODIUM CHLORIDE 9 MG/ML
INJECTION, SOLUTION INTRAVENOUS CONTINUOUS
Status: DISCONTINUED | OUTPATIENT
Start: 2019-09-20 | End: 2019-09-20

## 2019-09-20 RX ORDER — LEVOTHYROXINE SODIUM 88 UG/1
88 TABLET ORAL DAILY
Status: DISCONTINUED | OUTPATIENT
Start: 2019-09-20 | End: 2019-09-20 | Stop reason: ALTCHOICE

## 2019-09-20 RX ORDER — SODIUM CHLORIDE 0.9 % (FLUSH) 0.9 %
10 SYRINGE (ML) INJECTION EVERY 12 HOURS SCHEDULED
Status: DISCONTINUED | OUTPATIENT
Start: 2019-09-20 | End: 2019-09-25 | Stop reason: HOSPADM

## 2019-09-20 RX ORDER — GUAIFENESIN/DEXTROMETHORPHAN 100-10MG/5
5 SYRUP ORAL 3 TIMES DAILY PRN
Status: DISCONTINUED | OUTPATIENT
Start: 2019-09-20 | End: 2019-09-25 | Stop reason: HOSPADM

## 2019-09-20 RX ORDER — ONDANSETRON 2 MG/ML
4 INJECTION INTRAMUSCULAR; INTRAVENOUS EVERY 6 HOURS PRN
Status: DISCONTINUED | OUTPATIENT
Start: 2019-09-20 | End: 2019-09-25 | Stop reason: HOSPADM

## 2019-09-20 RX ORDER — DULOXETIN HYDROCHLORIDE 60 MG/1
60 CAPSULE, DELAYED RELEASE ORAL DAILY
Status: DISCONTINUED | OUTPATIENT
Start: 2019-09-20 | End: 2019-09-25 | Stop reason: HOSPADM

## 2019-09-20 RX ORDER — CARVEDILOL 6.25 MG/1
6.25 TABLET ORAL 2 TIMES DAILY
Status: DISCONTINUED | OUTPATIENT
Start: 2019-09-20 | End: 2019-09-25 | Stop reason: HOSPADM

## 2019-09-20 RX ORDER — POTASSIUM CHLORIDE 20 MEQ/1
40 TABLET, EXTENDED RELEASE ORAL PRN
Status: DISCONTINUED | OUTPATIENT
Start: 2019-09-20 | End: 2019-09-24

## 2019-09-20 RX ORDER — FENTANYL 75 UG/H
1 PATCH TRANSDERMAL
Status: DISCONTINUED | OUTPATIENT
Start: 2019-09-22 | End: 2019-09-25 | Stop reason: HOSPADM

## 2019-09-20 RX ORDER — FLUTICASONE PROPIONATE 50 MCG
2 SPRAY, SUSPENSION (ML) NASAL DAILY
Status: DISCONTINUED | OUTPATIENT
Start: 2019-09-20 | End: 2019-09-25 | Stop reason: HOSPADM

## 2019-09-20 RX ORDER — FENTANYL CITRATE 50 UG/ML
25 INJECTION, SOLUTION INTRAMUSCULAR; INTRAVENOUS ONCE
Status: COMPLETED | OUTPATIENT
Start: 2019-09-20 | End: 2019-09-20

## 2019-09-20 RX ORDER — POTASSIUM CHLORIDE 7.45 MG/ML
10 INJECTION INTRAVENOUS PRN
Status: DISCONTINUED | OUTPATIENT
Start: 2019-09-20 | End: 2019-09-25 | Stop reason: HOSPADM

## 2019-09-20 RX ADMIN — SODIUM CHLORIDE: 9 INJECTION, SOLUTION INTRAVENOUS at 04:19

## 2019-09-20 RX ADMIN — CYCLOBENZAPRINE 10 MG: 10 TABLET, FILM COATED ORAL at 18:12

## 2019-09-20 RX ADMIN — MORPHINE SULFATE 2 MG: 2 INJECTION, SOLUTION INTRAMUSCULAR; INTRAVENOUS at 06:49

## 2019-09-20 RX ADMIN — CARVEDILOL 6.25 MG: 6.25 TABLET, FILM COATED ORAL at 10:30

## 2019-09-20 RX ADMIN — ONDANSETRON 4 MG: 2 INJECTION INTRAMUSCULAR; INTRAVENOUS at 06:49

## 2019-09-20 RX ADMIN — LAMOTRIGINE 100 MG: 100 TABLET ORAL at 20:19

## 2019-09-20 RX ADMIN — OXYBUTYNIN CHLORIDE 2.5 MG: 5 TABLET ORAL at 20:19

## 2019-09-20 RX ADMIN — Medication 10 ML: at 20:20

## 2019-09-20 RX ADMIN — CARVEDILOL 6.25 MG: 6.25 TABLET, FILM COATED ORAL at 20:19

## 2019-09-20 RX ADMIN — FENTANYL CITRATE 25 MCG: 0.05 INJECTION, SOLUTION INTRAMUSCULAR; INTRAVENOUS at 04:12

## 2019-09-20 RX ADMIN — LEVOTHYROXINE SODIUM 188 MCG: 100 TABLET ORAL at 10:30

## 2019-09-20 RX ADMIN — OXYBUTYNIN CHLORIDE 2.5 MG: 5 TABLET ORAL at 10:29

## 2019-09-20 RX ADMIN — VANCOMYCIN HYDROCHLORIDE 1000 MG: 1 INJECTION, POWDER, LYOPHILIZED, FOR SOLUTION INTRAVENOUS at 04:46

## 2019-09-20 RX ADMIN — SODIUM CHLORIDE: 9 INJECTION, SOLUTION INTRAVENOUS at 06:45

## 2019-09-20 RX ADMIN — ACETAMINOPHEN 500 MG: 500 TABLET ORAL at 14:42

## 2019-09-20 RX ADMIN — Medication 10 ML: at 18:17

## 2019-09-20 RX ADMIN — ARIPIPRAZOLE 5 MG: 5 TABLET ORAL at 20:19

## 2019-09-20 RX ADMIN — DAPTOMYCIN 400 MG: 500 INJECTION, POWDER, LYOPHILIZED, FOR SOLUTION INTRAVENOUS at 18:12

## 2019-09-20 RX ADMIN — SIMVASTATIN 20 MG: 20 TABLET, FILM COATED ORAL at 20:19

## 2019-09-20 RX ADMIN — LAMOTRIGINE 100 MG: 100 TABLET ORAL at 10:29

## 2019-09-20 RX ADMIN — FLUTICASONE PROPIONATE 2 SPRAY: 50 SPRAY, METERED NASAL at 10:29

## 2019-09-20 RX ADMIN — APIXABAN 5 MG: 5 TABLET, FILM COATED ORAL at 10:34

## 2019-09-20 RX ADMIN — APIXABAN 5 MG: 5 TABLET, FILM COATED ORAL at 20:19

## 2019-09-20 ASSESSMENT — PAIN DESCRIPTION - ONSET: ONSET: SUDDEN

## 2019-09-20 ASSESSMENT — PAIN DESCRIPTION - FREQUENCY: FREQUENCY: CONTINUOUS

## 2019-09-20 ASSESSMENT — PAIN SCALES - GENERAL
PAINLEVEL_OUTOF10: 10
PAINLEVEL_OUTOF10: 10
PAINLEVEL_OUTOF10: 0
PAINLEVEL_OUTOF10: 10
PAINLEVEL_OUTOF10: 10
PAINLEVEL_OUTOF10: 0
PAINLEVEL_OUTOF10: 10
PAINLEVEL_OUTOF10: 10

## 2019-09-20 ASSESSMENT — PAIN DESCRIPTION - DESCRIPTORS: DESCRIPTORS: ACHING

## 2019-09-20 ASSESSMENT — PAIN DESCRIPTION - PROGRESSION: CLINICAL_PROGRESSION: GRADUALLY WORSENING

## 2019-09-20 ASSESSMENT — PAIN DESCRIPTION - LOCATION
LOCATION: GENERALIZED
LOCATION: HIP
LOCATION: GENERALIZED

## 2019-09-20 ASSESSMENT — PAIN DESCRIPTION - ORIENTATION: ORIENTATION: RIGHT;LEFT

## 2019-09-20 ASSESSMENT — PAIN DESCRIPTION - PAIN TYPE: TYPE: ACUTE PAIN

## 2019-09-20 NOTE — ED PROVIDER NOTES
HPI:  9/20/19, Time: 1:26 AM         Brad Collier is a 66 y.o. female presenting to the ED for patient was transferring herself from the toilet to her wheelchair and fell. Patient complaining of bilateral hip pain. , beginning short time ago. The complaint has been persistent, moderate in severity, and worsened by movement of hips. .  Reporting no back pain. Patient reporting no chest pain or difficulty breathing and reports no new abdominal pain. Patient reports she does not believe she lost consciousness. Patient is on Eliquis at facility. She does have history of blood clots. ROS:   Pertinent positives and negatives are stated within HPI, all other systems reviewed and are negative.  --------------------------------------------- PAST HISTORY ---------------------------------------------  Past Medical History:  has a past medical history of Asthma, Autistic disorder, residual state, Cellulitis of left leg, Chronic major depressive disorder, recurrent episode ([facility] [address] Dermatophytosis, Hx of blood clots, Hyperlipidemia, Hypertension, Hypothyroidism, Lymphedema of both lower extremities, Mental retardation, Obesity, Osteoarthritis, and Seizures ([facility] [address] Past Surgical History:  has a past surgical history that includes Tonsillectomy (age 8); Appendectomy (1969); pr corneal transplant,endothelial (Left, 9/14/2018); and pr remv cataract extracap,insert lens (Right, 10/12/2018). Social History:  reports that she has never smoked. She has never used smokeless tobacco. She reports that she does not drink alcohol or use drugs. Family History: family history is not on file. The patients home medications have been reviewed.     Allergies: Latex; Cephalosporins; Clindamycin/lincomycin; Duoderm hydroactive [hydroactive dressings]; Pcn [penicillins]; and Tape [adhesive tape]    ---------------------------------------------------PHYSICAL EXAM--------------------------------------    Constitutional/General: Alert and oriented x3, well appearing, non toxic in NAD  Head: Normocephalic and atraumatic  Eyes: PERRL, EOMI  Mouth: Oropharynx clear, handling secretions, no trismus  Neck: Supple, full ROM, non tender to palpation in the midline, no stridor, no crepitus, no meningeal signs  Pulmonary: Lungs clear to auscultation bilaterally, no wheezes, rales, or rhonchi. Not in respiratory distress  Cardiovascular:  Regular rate. Regular rhythm. No murmurs, gallops, or rubs. 2+ distal pulses  Chest: no chest wall tenderness  Abdomen: Soft. Non tender. Non distended. +BS. No rebound, guarding, or rigidity. No pulsatile masses appreciated. Musculoskeletal: Moves all extremities somewhat limited to lower extremities patient has significant edema left greater than right. She has dressing over her left lower extremity. Skin: warm and dry. No rashes. Neurologic: GCS 15, CN 2-12 grossly intact, no focal deficits, symmetric strength 5/5 in the upper   Psych: Normal Affect    -------------------------------------------------- RESULTS -------------------------------------------------  I have personally reviewed all laboratory and imaging results for this patient. Results are listed below.      LABS:  Results for orders placed or performed during the hospital encounter of 09/20/19   CBC   Result Value Ref Range    WBC 27.0 (H) 4.5 - 11.5 E9/L    RBC 3.30 (L) 3.50 - 5.50 E12/L    Hemoglobin 10.7 (L) 11.5 - 15.5 g/dL    Hematocrit 31.8 (L) 34.0 - 48.0 %    MCV 96.4 80.0 - 99.9 fL    MCH 32.4 26.0 - 35.0 pg    MCHC 33.6 32.0 - 34.5 %    RDW 13.6 11.5 - 15.0 fL    Platelets 768 630 - 159 E9/L    MPV 9.7 7.0 - 12.0 fL   Comprehensive Metabolic Panel   Result Value Ref Range    Sodium 132 132 - 146 mmol/L    Potassium 4.1 3.5 - 5.0 mmol/L    Chloride 92 (L) 98 - 107 mmol/L    CO2 22 22 - 29 mmol/L    Anion Gap 18 (H) 7 - 16 mmol/L    Glucose 139 (H) 74 - 99 mg/dL    BUN 59 (H) 8 - 23 mg/dL    CREATININE 3.4 (H) 0.5 - 1.0 mg/dL    GFR

## 2019-09-20 NOTE — CONSULTS
 Years of education: Not on file    Highest education level: Not on file   Occupational History    Not on file   Social Needs    Financial resource strain: Not on file    Food insecurity:     Worry: Not on file     Inability: Not on file    Transportation needs:     Medical: Not on file     Non-medical: Not on file   Tobacco Use    Smoking status: Never Smoker    Smokeless tobacco: Never Used   Substance and Sexual Activity    Alcohol use: No    Drug use: No    Sexual activity: Never   Lifestyle    Physical activity:     Days per week: Not on file     Minutes per session: Not on file    Stress: Not on file   Relationships    Social connections:     Talks on phone: Not on file     Gets together: Not on file     Attends Bahai service: Not on file     Active member of club or organization: Not on file     Attends meetings of clubs or organizations: Not on file     Relationship status: Not on file    Intimate partner violence:     Fear of current or ex partner: Not on file     Emotionally abused: Not on file     Physically abused: Not on file     Forced sexual activity: Not on file   Other Topics Concern    Not on file   Social History Narrative    Not on file       Family History:   History reviewed. No pertinent family history. REVIEW OF SYSTEMS:      Unable to obtain appropriate     PHYSICAL EXAM:      Vitals:    VITALS:  /80   Pulse 80   Temp 97 °F (36.1 °C) (Temporal)   Resp 18   Ht 5' 1\" (1.549 m)   Wt 200 lb 4.8 oz (90.9 kg)   SpO2 95%   BMI 37.85 kg/m²     GENERAL: Mild distress,confused weak tired   EYES: no pallor, no icterus   NOSE EAR THROAT: no ulcers, no rashes, no drainage   NECK: thyroid not palpable, LN not appreciated   RESP: air entry b/l , No added sounds.    CVS: S1 S2 heard, No murmur gallop or rub appreciated   GI: soft, non tender, no organomegaly appreciated, BS +  MS/ Ext chronic mod edema, Pulses equal.   NEURO: AAOx1,unable to test fully   DATA:    CBC:
alert, and oriented. Skin: Warm and dry. No rashes were noted. Left lower extremity shows cellulitis  HEENT: Eyes show round, and reactive pupils. No jaundice. Moist mucous membranes, no ulcerations, no thrush. Neck: Supple to movements. No lymphadenopathy. Chest: No use of accessory muscles to breathe. Symmetrical expansion. Auscultation reveals no wheezing, crackles, or rhonchi. Cardiovascular: S1 and S2 are rhythmic and regular. No murmurs appreciated. Abdomen: Positive bowel sounds to auscultation. Benign to palpation. No masses felt. No hepatosplenomegaly. Extremities: No clubbing, no cyanosis, no edema.   Musculoskeletal: Left leg swelling and lymphedema  Neurological: Focal deficit but mentally challenged  Lines: peripheral      CBC+dif:  Recent Labs     09/20/19  0141   WBC 27.0*   HGB 10.7*   HCT 31.8*   MCV 96.4        No results found for: CRP  No results found for: CRPHS  No results found for: SEDRATE  Lab Results   Component Value Date    ALT 19 09/20/2019    AST 34 (H) 09/20/2019    ALKPHOS 102 09/20/2019    BILITOT 0.5 09/20/2019     Lab Results   Component Value Date     09/20/2019    K 4.4 09/20/2019    CL 93 09/20/2019    CO2 18 09/20/2019    BUN 68 09/20/2019    CREATININE 3.5 09/20/2019    GFRAA 15 09/20/2019    LABGLOM 13 09/20/2019    GLUCOSE 122 09/20/2019    PROT 7.9 09/20/2019    LABALBU 3.3 09/20/2019    CALCIUM 8.8 09/20/2019    BILITOT 0.5 09/20/2019    ALKPHOS 102 09/20/2019    AST 34 09/20/2019    ALT 19 09/20/2019       No results found for: PROTIME, INR    No results found for: TSH    Lab Results   Component Value Date    COLORU Yellow 07/07/2019    PHUR 8.0 07/07/2019    CLARITYU Clear 07/07/2019    SPECGRAV 1.010 07/07/2019    LEUKOCYTESUR Negative 07/07/2019    UROBILINOGEN 0.2 07/07/2019    BILIRUBINUR Negative 07/07/2019    BLOODU Negative 07/07/2019    GLUCOSEU Negative 07/07/2019       No results found for: NSD0VIP, BEART, T4HNTTQW, PHART, THGBART,

## 2019-09-21 ENCOUNTER — APPOINTMENT (OUTPATIENT)
Dept: GENERAL RADIOLOGY | Age: 74
DRG: 871 | End: 2019-09-21
Payer: MEDICARE

## 2019-09-21 LAB
ANION GAP SERPL CALCULATED.3IONS-SCNC: 15 MMOL/L (ref 7–16)
ANION GAP SERPL CALCULATED.3IONS-SCNC: 20 MMOL/L (ref 7–16)
ANTISTREPTOLYSIN-O: 45 IU/ML (ref 0–200)
BACTERIA: ABNORMAL /HPF
BETA-HYDROXYBUTYRATE: 0.38 MMOL/L (ref 0.02–0.27)
BILIRUBIN URINE: NEGATIVE
BLOOD, URINE: ABNORMAL
BUN BLDV-MCNC: 75 MG/DL (ref 8–23)
BUN BLDV-MCNC: 76 MG/DL (ref 8–23)
CALCIUM SERPL-MCNC: 8.1 MG/DL (ref 8.6–10.2)
CALCIUM SERPL-MCNC: 8.3 MG/DL (ref 8.6–10.2)
CHLORIDE BLD-SCNC: 96 MMOL/L (ref 98–107)
CHLORIDE BLD-SCNC: 99 MMOL/L (ref 98–107)
CHLORIDE URINE RANDOM: 22 MMOL/L
CHLORIDE URINE RANDOM: 39 MMOL/L
CLARITY: CLEAR
CO2: 19 MMOL/L (ref 22–29)
CO2: 25 MMOL/L (ref 22–29)
COLOR: YELLOW
CREAT SERPL-MCNC: 3.7 MG/DL (ref 0.5–1)
CREAT SERPL-MCNC: 3.8 MG/DL (ref 0.5–1)
CREATININE URINE: 66 MG/DL (ref 29–226)
CREATININE URINE: 83 MG/DL (ref 29–226)
EPITHELIAL CELLS, UA: ABNORMAL /HPF
GFR AFRICAN AMERICAN: 14
GFR AFRICAN AMERICAN: 14
GFR NON-AFRICAN AMERICAN: 12 ML/MIN/1.73
GFR NON-AFRICAN AMERICAN: 12 ML/MIN/1.73
GLUCOSE BLD-MCNC: 122 MG/DL (ref 74–99)
GLUCOSE BLD-MCNC: 124 MG/DL (ref 74–99)
GLUCOSE URINE: NEGATIVE MG/DL
HCT VFR BLD CALC: 27.7 % (ref 34–48)
HEMOGLOBIN: 9.2 G/DL (ref 11.5–15.5)
IRON SATURATION: 12 % (ref 15–50)
IRON: 16 MCG/DL (ref 37–145)
KETONES, URINE: NEGATIVE MG/DL
LACTIC ACID, SEPSIS: 1.5 MMOL/L (ref 0.5–1.9)
LEUKOCYTE ESTERASE, URINE: ABNORMAL
MCH RBC QN AUTO: 32.2 PG (ref 26–35)
MCHC RBC AUTO-ENTMCNC: 33.2 % (ref 32–34.5)
MCV RBC AUTO: 96.9 FL (ref 80–99.9)
MICROALBUMIN UR-MCNC: 116.2 MG/L
MICROALBUMIN/CREAT UR-RTO: 176.1 (ref 0–30)
NITRITE, URINE: NEGATIVE
PDW BLD-RTO: 13.8 FL (ref 11.5–15)
PH UA: 5 (ref 5–9)
PLATELET # BLD: 195 E9/L (ref 130–450)
PMV BLD AUTO: 10.3 FL (ref 7–12)
POTASSIUM REFLEX MAGNESIUM: 4.4 MMOL/L (ref 3.5–5)
POTASSIUM SERPL-SCNC: 4.5 MMOL/L (ref 3.5–5)
POTASSIUM, UR: 39.5 MMOL/L
POTASSIUM, UR: 52.1 MMOL/L
PROTEIN PROTEIN: 57 MG/DL (ref 0–12)
PROTEIN UA: 30 MG/DL
PROTEIN/CREAT RATIO: 0.9
PROTEIN/CREAT RATIO: 0.9 (ref 0–0.2)
RBC # BLD: 2.86 E12/L (ref 3.5–5.5)
RBC UA: ABNORMAL /HPF (ref 0–2)
SODIUM BLD-SCNC: 135 MMOL/L (ref 132–146)
SODIUM BLD-SCNC: 139 MMOL/L (ref 132–146)
SODIUM URINE: 41 MMOL/L
SODIUM URINE: 58 MMOL/L
SPECIFIC GRAVITY UA: 1.01 (ref 1–1.03)
TOTAL IRON BINDING CAPACITY: 134 MCG/DL (ref 250–450)
UROBILINOGEN, URINE: 0.2 E.U./DL
WBC # BLD: 22.8 E9/L (ref 4.5–11.5)
WBC UA: ABNORMAL /HPF (ref 0–5)

## 2019-09-21 PROCEDURE — 6370000000 HC RX 637 (ALT 250 FOR IP): Performed by: FAMILY MEDICINE

## 2019-09-21 PROCEDURE — 83550 IRON BINDING TEST: CPT

## 2019-09-21 PROCEDURE — 84133 ASSAY OF URINE POTASSIUM: CPT

## 2019-09-21 PROCEDURE — 36415 COLL VENOUS BLD VENIPUNCTURE: CPT

## 2019-09-21 PROCEDURE — 82436 ASSAY OF URINE CHLORIDE: CPT

## 2019-09-21 PROCEDURE — 83540 ASSAY OF IRON: CPT

## 2019-09-21 PROCEDURE — 80048 BASIC METABOLIC PNL TOTAL CA: CPT

## 2019-09-21 PROCEDURE — 82010 KETONE BODYS QUAN: CPT

## 2019-09-21 PROCEDURE — 2060000000 HC ICU INTERMEDIATE R&B

## 2019-09-21 PROCEDURE — 86060 ANTISTREPTOLYSIN O TITER: CPT

## 2019-09-21 PROCEDURE — 85027 COMPLETE CBC AUTOMATED: CPT

## 2019-09-21 PROCEDURE — 81001 URINALYSIS AUTO W/SCOPE: CPT

## 2019-09-21 PROCEDURE — 82044 UR ALBUMIN SEMIQUANTITATIVE: CPT

## 2019-09-21 PROCEDURE — 84156 ASSAY OF PROTEIN URINE: CPT

## 2019-09-21 PROCEDURE — 83605 ASSAY OF LACTIC ACID: CPT

## 2019-09-21 PROCEDURE — 84300 ASSAY OF URINE SODIUM: CPT

## 2019-09-21 PROCEDURE — 82570 ASSAY OF URINE CREATININE: CPT

## 2019-09-21 RX ADMIN — OXYBUTYNIN CHLORIDE 2.5 MG: 5 TABLET ORAL at 09:01

## 2019-09-21 RX ADMIN — ACETAMINOPHEN 500 MG: 500 TABLET ORAL at 02:33

## 2019-09-21 RX ADMIN — CARVEDILOL 6.25 MG: 6.25 TABLET, FILM COATED ORAL at 09:01

## 2019-09-21 RX ADMIN — LEVOTHYROXINE SODIUM 188 MCG: 100 TABLET ORAL at 06:15

## 2019-09-21 RX ADMIN — CYCLOBENZAPRINE 10 MG: 10 TABLET, FILM COATED ORAL at 02:33

## 2019-09-21 RX ADMIN — SIMVASTATIN 20 MG: 20 TABLET, FILM COATED ORAL at 23:14

## 2019-09-21 RX ADMIN — LAMOTRIGINE 100 MG: 100 TABLET ORAL at 23:14

## 2019-09-21 RX ADMIN — FLUTICASONE PROPIONATE 2 SPRAY: 50 SPRAY, METERED NASAL at 09:01

## 2019-09-21 RX ADMIN — APIXABAN 5 MG: 5 TABLET, FILM COATED ORAL at 09:01

## 2019-09-21 RX ADMIN — LAMOTRIGINE 100 MG: 100 TABLET ORAL at 09:01

## 2019-09-21 RX ADMIN — ARIPIPRAZOLE 5 MG: 5 TABLET ORAL at 23:13

## 2019-09-21 RX ADMIN — CARVEDILOL 6.25 MG: 6.25 TABLET, FILM COATED ORAL at 23:13

## 2019-09-21 RX ADMIN — APIXABAN 5 MG: 5 TABLET, FILM COATED ORAL at 23:14

## 2019-09-21 RX ADMIN — OXYBUTYNIN CHLORIDE 2.5 MG: 5 TABLET ORAL at 23:14

## 2019-09-21 ASSESSMENT — PAIN SCALES - GENERAL
PAINLEVEL_OUTOF10: 10

## 2019-09-21 ASSESSMENT — PAIN DESCRIPTION - LOCATION
LOCATION: GENERALIZED
LOCATION: GENERALIZED

## 2019-09-21 ASSESSMENT — PAIN DESCRIPTION - ORIENTATION: ORIENTATION: RIGHT;LEFT;UPPER;LOWER

## 2019-09-21 ASSESSMENT — PAIN DESCRIPTION - PAIN TYPE
TYPE: ACUTE PAIN;CHRONIC PAIN
TYPE: ACUTE PAIN;CHRONIC PAIN

## 2019-09-21 ASSESSMENT — PAIN DESCRIPTION - FREQUENCY: FREQUENCY: CONTINUOUS

## 2019-09-21 ASSESSMENT — PAIN DESCRIPTION - ONSET: ONSET: ON-GOING

## 2019-09-22 ENCOUNTER — APPOINTMENT (OUTPATIENT)
Dept: GENERAL RADIOLOGY | Age: 74
DRG: 871 | End: 2019-09-22
Payer: MEDICARE

## 2019-09-22 LAB
ANION GAP SERPL CALCULATED.3IONS-SCNC: 13 MMOL/L (ref 7–16)
ANION GAP SERPL CALCULATED.3IONS-SCNC: 21 MMOL/L (ref 7–16)
ANION GAP SERPL CALCULATED.3IONS-SCNC: 22 MMOL/L (ref 7–16)
ANTISTREPTOLYSIN-O: 38 IU/ML (ref 0–200)
BUN BLDV-MCNC: 77 MG/DL (ref 8–23)
BUN BLDV-MCNC: 77 MG/DL (ref 8–23)
BUN BLDV-MCNC: 80 MG/DL (ref 8–23)
C-REACTIVE PROTEIN: 33.1 MG/DL (ref 0–0.4)
CALCIUM SERPL-MCNC: 7.9 MG/DL (ref 8.6–10.2)
CALCIUM SERPL-MCNC: 7.9 MG/DL (ref 8.6–10.2)
CALCIUM SERPL-MCNC: 8.3 MG/DL (ref 8.6–10.2)
CHLORIDE BLD-SCNC: 101 MMOL/L (ref 98–107)
CHLORIDE BLD-SCNC: 99 MMOL/L (ref 98–107)
CHLORIDE BLD-SCNC: 99 MMOL/L (ref 98–107)
CO2: 17 MMOL/L (ref 22–29)
CO2: 18 MMOL/L (ref 22–29)
CO2: 24 MMOL/L (ref 22–29)
CREAT SERPL-MCNC: 3.2 MG/DL (ref 0.5–1)
CREAT SERPL-MCNC: 3.4 MG/DL (ref 0.5–1)
CREAT SERPL-MCNC: 3.4 MG/DL (ref 0.5–1)
GFR AFRICAN AMERICAN: 16
GFR AFRICAN AMERICAN: 16
GFR AFRICAN AMERICAN: 17
GFR NON-AFRICAN AMERICAN: 13 ML/MIN/1.73
GFR NON-AFRICAN AMERICAN: 13 ML/MIN/1.73
GFR NON-AFRICAN AMERICAN: 14 ML/MIN/1.73
GLUCOSE BLD-MCNC: 121 MG/DL (ref 74–99)
GLUCOSE BLD-MCNC: 124 MG/DL (ref 74–99)
GLUCOSE BLD-MCNC: 183 MG/DL (ref 74–99)
HCT VFR BLD CALC: 27.3 % (ref 34–48)
HEMOGLOBIN: 9 G/DL (ref 11.5–15.5)
MAGNESIUM: 2.5 MG/DL (ref 1.6–2.6)
MCH RBC QN AUTO: 31.7 PG (ref 26–35)
MCHC RBC AUTO-ENTMCNC: 33 % (ref 32–34.5)
MCV RBC AUTO: 96.1 FL (ref 80–99.9)
PDW BLD-RTO: 13.6 FL (ref 11.5–15)
PHOSPHORUS: 3.3 MG/DL (ref 2.5–4.5)
PLATELET # BLD: 184 E9/L (ref 130–450)
PMV BLD AUTO: 10.4 FL (ref 7–12)
POTASSIUM SERPL-SCNC: 3.7 MMOL/L (ref 3.5–5)
POTASSIUM SERPL-SCNC: 3.8 MMOL/L (ref 3.5–5)
POTASSIUM SERPL-SCNC: 3.9 MMOL/L (ref 3.5–5)
RBC # BLD: 2.84 E12/L (ref 3.5–5.5)
SEDIMENTATION RATE, ERYTHROCYTE: 97 MM/HR (ref 0–20)
SODIUM BLD-SCNC: 138 MMOL/L (ref 132–146)
WBC # BLD: 20 E9/L (ref 4.5–11.5)

## 2019-09-22 PROCEDURE — 97530 THERAPEUTIC ACTIVITIES: CPT

## 2019-09-22 PROCEDURE — 72220 X-RAY EXAM SACRUM TAILBONE: CPT

## 2019-09-22 PROCEDURE — 80048 BASIC METABOLIC PNL TOTAL CA: CPT

## 2019-09-22 PROCEDURE — 2580000003 HC RX 258: Performed by: FAMILY MEDICINE

## 2019-09-22 PROCEDURE — 83735 ASSAY OF MAGNESIUM: CPT

## 2019-09-22 PROCEDURE — 36415 COLL VENOUS BLD VENIPUNCTURE: CPT

## 2019-09-22 PROCEDURE — 1200000000 HC SEMI PRIVATE

## 2019-09-22 PROCEDURE — 85651 RBC SED RATE NONAUTOMATED: CPT

## 2019-09-22 PROCEDURE — 86140 C-REACTIVE PROTEIN: CPT

## 2019-09-22 PROCEDURE — 6360000002 HC RX W HCPCS: Performed by: SPECIALIST

## 2019-09-22 PROCEDURE — 6370000000 HC RX 637 (ALT 250 FOR IP): Performed by: FAMILY MEDICINE

## 2019-09-22 PROCEDURE — 97161 PT EVAL LOW COMPLEX 20 MIN: CPT

## 2019-09-22 PROCEDURE — 6370000000 HC RX 637 (ALT 250 FOR IP): Performed by: SPECIALIST

## 2019-09-22 PROCEDURE — 85027 COMPLETE CBC AUTOMATED: CPT

## 2019-09-22 PROCEDURE — 86060 ANTISTREPTOLYSIN O TITER: CPT

## 2019-09-22 PROCEDURE — 2580000003 HC RX 258: Performed by: SPECIALIST

## 2019-09-22 PROCEDURE — 84100 ASSAY OF PHOSPHORUS: CPT

## 2019-09-22 RX ORDER — LEVOFLOXACIN 750 MG/1
750 TABLET ORAL EVERY OTHER DAY
Status: DISCONTINUED | OUTPATIENT
Start: 2019-09-22 | End: 2019-09-25 | Stop reason: HOSPADM

## 2019-09-22 RX ADMIN — LEVOTHYROXINE SODIUM 188 MCG: 100 TABLET ORAL at 05:54

## 2019-09-22 RX ADMIN — CARVEDILOL 6.25 MG: 6.25 TABLET, FILM COATED ORAL at 21:26

## 2019-09-22 RX ADMIN — APIXABAN 5 MG: 5 TABLET, FILM COATED ORAL at 21:26

## 2019-09-22 RX ADMIN — DAPTOMYCIN 400 MG: 500 INJECTION, POWDER, LYOPHILIZED, FOR SOLUTION INTRAVENOUS at 15:02

## 2019-09-22 RX ADMIN — CARVEDILOL 6.25 MG: 6.25 TABLET, FILM COATED ORAL at 08:43

## 2019-09-22 RX ADMIN — LAMOTRIGINE 100 MG: 100 TABLET ORAL at 08:43

## 2019-09-22 RX ADMIN — APIXABAN 5 MG: 5 TABLET, FILM COATED ORAL at 08:43

## 2019-09-22 RX ADMIN — CYCLOBENZAPRINE 10 MG: 10 TABLET, FILM COATED ORAL at 05:54

## 2019-09-22 RX ADMIN — LEVOFLOXACIN 750 MG: 750 TABLET, FILM COATED ORAL at 12:55

## 2019-09-22 RX ADMIN — Medication 10 ML: at 21:33

## 2019-09-22 RX ADMIN — ACETAMINOPHEN 500 MG: 500 TABLET ORAL at 08:43

## 2019-09-22 RX ADMIN — CYCLOBENZAPRINE 10 MG: 10 TABLET, FILM COATED ORAL at 12:32

## 2019-09-22 RX ADMIN — SIMVASTATIN 20 MG: 20 TABLET, FILM COATED ORAL at 21:26

## 2019-09-22 RX ADMIN — OXYBUTYNIN CHLORIDE 2.5 MG: 5 TABLET ORAL at 21:26

## 2019-09-22 RX ADMIN — ARIPIPRAZOLE 5 MG: 5 TABLET ORAL at 21:26

## 2019-09-22 RX ADMIN — LAMOTRIGINE 100 MG: 100 TABLET ORAL at 21:26

## 2019-09-22 RX ADMIN — FLUTICASONE PROPIONATE 2 SPRAY: 50 SPRAY, METERED NASAL at 08:43

## 2019-09-22 RX ADMIN — OXYBUTYNIN CHLORIDE 2.5 MG: 5 TABLET ORAL at 08:43

## 2019-09-22 ASSESSMENT — PAIN SCALES - GENERAL
PAINLEVEL_OUTOF10: 10
PAINLEVEL_OUTOF10: 9
PAINLEVEL_OUTOF10: 10
PAINLEVEL_OUTOF10: 8
PAINLEVEL_OUTOF10: 10

## 2019-09-22 ASSESSMENT — PAIN DESCRIPTION - ONSET: ONSET: ON-GOING

## 2019-09-22 ASSESSMENT — PAIN DESCRIPTION - LOCATION: LOCATION: ABDOMEN

## 2019-09-22 ASSESSMENT — PAIN DESCRIPTION - ORIENTATION: ORIENTATION: RIGHT;LEFT

## 2019-09-22 ASSESSMENT — PAIN DESCRIPTION - PROGRESSION: CLINICAL_PROGRESSION: NOT CHANGED

## 2019-09-22 ASSESSMENT — PAIN DESCRIPTION - FREQUENCY: FREQUENCY: CONTINUOUS

## 2019-09-22 ASSESSMENT — PAIN DESCRIPTION - PAIN TYPE: TYPE: ACUTE PAIN

## 2019-09-22 ASSESSMENT — PAIN DESCRIPTION - DESCRIPTORS: DESCRIPTORS: ACHING

## 2019-09-23 LAB
ANION GAP SERPL CALCULATED.3IONS-SCNC: 20 MMOL/L (ref 7–16)
BUN BLDV-MCNC: 71 MG/DL (ref 8–23)
CALCIUM SERPL-MCNC: 8 MG/DL (ref 8.6–10.2)
CHLORIDE BLD-SCNC: 100 MMOL/L (ref 98–107)
CO2: 17 MMOL/L (ref 22–29)
CREAT SERPL-MCNC: 2.7 MG/DL (ref 0.5–1)
GFR AFRICAN AMERICAN: 21
GFR NON-AFRICAN AMERICAN: 17 ML/MIN/1.73
GLUCOSE BLD-MCNC: 155 MG/DL (ref 74–99)
HCT VFR BLD CALC: 26.2 % (ref 34–48)
HEMOGLOBIN: 9.1 G/DL (ref 11.5–15.5)
LACTIC ACID: 0.9 MMOL/L (ref 0.5–2.2)
MCH RBC QN AUTO: 32.9 PG (ref 26–35)
MCHC RBC AUTO-ENTMCNC: 34.7 % (ref 32–34.5)
MCV RBC AUTO: 94.6 FL (ref 80–99.9)
PDW BLD-RTO: 13.9 FL (ref 11.5–15)
PLATELET # BLD: 202 E9/L (ref 130–450)
PMV BLD AUTO: 10.1 FL (ref 7–12)
POTASSIUM SERPL-SCNC: 3.4 MMOL/L (ref 3.5–5)
RBC # BLD: 2.77 E12/L (ref 3.5–5.5)
SODIUM BLD-SCNC: 137 MMOL/L (ref 132–146)
WBC # BLD: 15.3 E9/L (ref 4.5–11.5)

## 2019-09-23 PROCEDURE — 85027 COMPLETE CBC AUTOMATED: CPT

## 2019-09-23 PROCEDURE — 2580000003 HC RX 258: Performed by: FAMILY MEDICINE

## 2019-09-23 PROCEDURE — 1200000000 HC SEMI PRIVATE

## 2019-09-23 PROCEDURE — 36415 COLL VENOUS BLD VENIPUNCTURE: CPT

## 2019-09-23 PROCEDURE — 2500000003 HC RX 250 WO HCPCS: Performed by: INTERNAL MEDICINE

## 2019-09-23 PROCEDURE — 6370000000 HC RX 637 (ALT 250 FOR IP): Performed by: SPECIALIST

## 2019-09-23 PROCEDURE — 80048 BASIC METABOLIC PNL TOTAL CA: CPT

## 2019-09-23 PROCEDURE — 6370000000 HC RX 637 (ALT 250 FOR IP): Performed by: FAMILY MEDICINE

## 2019-09-23 PROCEDURE — 2580000003 HC RX 258: Performed by: INTERNAL MEDICINE

## 2019-09-23 PROCEDURE — 83605 ASSAY OF LACTIC ACID: CPT

## 2019-09-23 RX ORDER — DOXYCYCLINE HYCLATE 100 MG/1
100 CAPSULE ORAL EVERY 12 HOURS SCHEDULED
Status: DISCONTINUED | OUTPATIENT
Start: 2019-09-23 | End: 2019-09-25 | Stop reason: HOSPADM

## 2019-09-23 RX ORDER — GREEN TEA/HOODIA GORDONII 315-12.5MG
3 CAPSULE ORAL 2 TIMES DAILY
Qty: 60 TABLET | Refills: 3 | Status: SHIPPED | OUTPATIENT
Start: 2019-09-23 | End: 2019-10-23

## 2019-09-23 RX ORDER — POTASSIUM CHLORIDE 20 MEQ/1
20 TABLET, EXTENDED RELEASE ORAL ONCE
Status: DISCONTINUED | OUTPATIENT
Start: 2019-09-23 | End: 2019-09-25 | Stop reason: HOSPADM

## 2019-09-23 RX ORDER — IPRATROPIUM BROMIDE AND ALBUTEROL SULFATE 2.5; .5 MG/3ML; MG/3ML
1 SOLUTION RESPIRATORY (INHALATION) EVERY 4 HOURS PRN
Status: DISCONTINUED | OUTPATIENT
Start: 2019-09-23 | End: 2019-09-25 | Stop reason: HOSPADM

## 2019-09-23 RX ORDER — DOXYCYCLINE HYCLATE 100 MG/1
100 CAPSULE ORAL EVERY 12 HOURS SCHEDULED
Qty: 20 CAPSULE | Refills: 0 | Status: SHIPPED | OUTPATIENT
Start: 2019-09-23 | End: 2019-10-03

## 2019-09-23 RX ORDER — LEVOFLOXACIN 750 MG/1
750 TABLET ORAL EVERY OTHER DAY
Qty: 5 TABLET | Refills: 0 | Status: SHIPPED | OUTPATIENT
Start: 2019-09-24 | End: 2019-10-04

## 2019-09-23 RX ADMIN — SODIUM CHLORIDE: 9 INJECTION, SOLUTION INTRAVENOUS at 00:10

## 2019-09-23 RX ADMIN — APIXABAN 5 MG: 5 TABLET, FILM COATED ORAL at 09:12

## 2019-09-23 RX ADMIN — LEVOTHYROXINE SODIUM 88 MCG: 100 TABLET ORAL at 07:06

## 2019-09-23 RX ADMIN — FLUTICASONE PROPIONATE 2 SPRAY: 50 SPRAY, METERED NASAL at 12:28

## 2019-09-23 RX ADMIN — LAMOTRIGINE 100 MG: 100 TABLET ORAL at 21:45

## 2019-09-23 RX ADMIN — CARVEDILOL 6.25 MG: 6.25 TABLET, FILM COATED ORAL at 09:12

## 2019-09-23 RX ADMIN — GUAIFENESIN AND DEXTROMETHORPHAN 5 ML: 100; 10 SYRUP ORAL at 03:26

## 2019-09-23 RX ADMIN — DOXYCYCLINE HYCLATE 100 MG: 100 CAPSULE ORAL at 21:42

## 2019-09-23 RX ADMIN — SODIUM BICARBONATE: 84 INJECTION, SOLUTION INTRAVENOUS at 14:58

## 2019-09-23 RX ADMIN — LAMOTRIGINE 100 MG: 100 TABLET ORAL at 09:12

## 2019-09-23 RX ADMIN — CYCLOBENZAPRINE 10 MG: 10 TABLET, FILM COATED ORAL at 21:42

## 2019-09-23 RX ADMIN — POTASSIUM CHLORIDE 40 MEQ: 1500 TABLET, EXTENDED RELEASE ORAL at 09:21

## 2019-09-23 RX ADMIN — OXYBUTYNIN CHLORIDE 2.5 MG: 5 TABLET ORAL at 09:11

## 2019-09-23 RX ADMIN — SIMVASTATIN 20 MG: 20 TABLET, FILM COATED ORAL at 21:42

## 2019-09-23 RX ADMIN — CARVEDILOL 6.25 MG: 6.25 TABLET, FILM COATED ORAL at 21:43

## 2019-09-23 RX ADMIN — Medication 10 ML: at 00:10

## 2019-09-23 RX ADMIN — ACETAMINOPHEN 500 MG: 500 TABLET ORAL at 09:11

## 2019-09-23 RX ADMIN — APIXABAN 5 MG: 5 TABLET, FILM COATED ORAL at 21:42

## 2019-09-23 RX ADMIN — SODIUM CHLORIDE: 9 INJECTION, SOLUTION INTRAVENOUS at 07:13

## 2019-09-23 RX ADMIN — ARIPIPRAZOLE 5 MG: 5 TABLET ORAL at 21:43

## 2019-09-23 RX ADMIN — OXYBUTYNIN CHLORIDE 2.5 MG: 5 TABLET ORAL at 21:42

## 2019-09-23 ASSESSMENT — PAIN SCALES - GENERAL
PAINLEVEL_OUTOF10: 0
PAINLEVEL_OUTOF10: 3
PAINLEVEL_OUTOF10: 5
PAINLEVEL_OUTOF10: 0
PAINLEVEL_OUTOF10: 1
PAINLEVEL_OUTOF10: 0

## 2019-09-23 ASSESSMENT — PAIN DESCRIPTION - LOCATION: LOCATION: LEG

## 2019-09-23 ASSESSMENT — PAIN DESCRIPTION - ONSET: ONSET: ON-GOING

## 2019-09-23 ASSESSMENT — PAIN DESCRIPTION - DESCRIPTORS: DESCRIPTORS: ACHING;DISCOMFORT;SPASM

## 2019-09-23 ASSESSMENT — PAIN DESCRIPTION - PAIN TYPE: TYPE: CHRONIC PAIN

## 2019-09-23 ASSESSMENT — PAIN - FUNCTIONAL ASSESSMENT: PAIN_FUNCTIONAL_ASSESSMENT: PREVENTS OR INTERFERES WITH ALL ACTIVE AND SOME PASSIVE ACTIVITIES

## 2019-09-23 ASSESSMENT — PAIN DESCRIPTION - FREQUENCY: FREQUENCY: INTERMITTENT

## 2019-09-23 ASSESSMENT — PAIN DESCRIPTION - PROGRESSION: CLINICAL_PROGRESSION: GRADUALLY WORSENING

## 2019-09-23 ASSESSMENT — PAIN DESCRIPTION - ORIENTATION: ORIENTATION: RIGHT;LEFT

## 2019-09-24 LAB
ANION GAP SERPL CALCULATED.3IONS-SCNC: 17 MMOL/L (ref 7–16)
ANION GAP SERPL CALCULATED.3IONS-SCNC: 17 MMOL/L (ref 7–16)
BUN BLDV-MCNC: 53 MG/DL (ref 8–23)
BUN BLDV-MCNC: 58 MG/DL (ref 8–23)
CALCIUM SERPL-MCNC: 7.8 MG/DL (ref 8.6–10.2)
CALCIUM SERPL-MCNC: 7.9 MG/DL (ref 8.6–10.2)
CHLORIDE BLD-SCNC: 100 MMOL/L (ref 98–107)
CHLORIDE BLD-SCNC: 97 MMOL/L (ref 98–107)
CO2: 20 MMOL/L (ref 22–29)
CO2: 20 MMOL/L (ref 22–29)
CREAT SERPL-MCNC: 2 MG/DL (ref 0.5–1)
CREAT SERPL-MCNC: 2.2 MG/DL (ref 0.5–1)
GFR AFRICAN AMERICAN: 26
GFR AFRICAN AMERICAN: 29
GFR NON-AFRICAN AMERICAN: 22 ML/MIN/1.73
GFR NON-AFRICAN AMERICAN: 24 ML/MIN/1.73
GLUCOSE BLD-MCNC: 135 MG/DL (ref 74–99)
GLUCOSE BLD-MCNC: 143 MG/DL (ref 74–99)
MAGNESIUM: 2.3 MG/DL (ref 1.6–2.6)
ORGANISM: ABNORMAL
POTASSIUM SERPL-SCNC: 3.3 MMOL/L (ref 3.5–5)
POTASSIUM SERPL-SCNC: 3.5 MMOL/L (ref 3.5–5)
SODIUM BLD-SCNC: 134 MMOL/L (ref 132–146)
SODIUM BLD-SCNC: 137 MMOL/L (ref 132–146)
WOUND/ABSCESS: ABNORMAL
WOUND/ABSCESS: ABNORMAL

## 2019-09-24 PROCEDURE — 80048 BASIC METABOLIC PNL TOTAL CA: CPT

## 2019-09-24 PROCEDURE — 36415 COLL VENOUS BLD VENIPUNCTURE: CPT

## 2019-09-24 PROCEDURE — 2500000003 HC RX 250 WO HCPCS: Performed by: INTERNAL MEDICINE

## 2019-09-24 PROCEDURE — 2580000003 HC RX 258: Performed by: INTERNAL MEDICINE

## 2019-09-24 PROCEDURE — 6370000000 HC RX 637 (ALT 250 FOR IP): Performed by: SPECIALIST

## 2019-09-24 PROCEDURE — 83735 ASSAY OF MAGNESIUM: CPT

## 2019-09-24 PROCEDURE — 1200000000 HC SEMI PRIVATE

## 2019-09-24 PROCEDURE — 6370000000 HC RX 637 (ALT 250 FOR IP): Performed by: FAMILY MEDICINE

## 2019-09-24 PROCEDURE — 6370000000 HC RX 637 (ALT 250 FOR IP): Performed by: INTERNAL MEDICINE

## 2019-09-24 RX ORDER — POTASSIUM CHLORIDE 20 MEQ/1
40 TABLET, EXTENDED RELEASE ORAL ONCE
Status: COMPLETED | OUTPATIENT
Start: 2019-09-24 | End: 2019-09-24

## 2019-09-24 RX ADMIN — SODIUM BICARBONATE: 84 INJECTION, SOLUTION INTRAVENOUS at 07:43

## 2019-09-24 RX ADMIN — ARIPIPRAZOLE 5 MG: 5 TABLET ORAL at 21:23

## 2019-09-24 RX ADMIN — DOXYCYCLINE HYCLATE 100 MG: 100 CAPSULE ORAL at 21:23

## 2019-09-24 RX ADMIN — CARVEDILOL 6.25 MG: 6.25 TABLET, FILM COATED ORAL at 21:23

## 2019-09-24 RX ADMIN — APIXABAN 5 MG: 5 TABLET, FILM COATED ORAL at 21:24

## 2019-09-24 RX ADMIN — POTASSIUM CHLORIDE 40 MEQ: 20 TABLET, EXTENDED RELEASE ORAL at 13:02

## 2019-09-24 RX ADMIN — ACETAMINOPHEN 500 MG: 500 TABLET ORAL at 21:30

## 2019-09-24 RX ADMIN — APIXABAN 5 MG: 5 TABLET, FILM COATED ORAL at 08:25

## 2019-09-24 RX ADMIN — FLUTICASONE PROPIONATE 2 SPRAY: 50 SPRAY, METERED NASAL at 08:28

## 2019-09-24 RX ADMIN — SIMVASTATIN 20 MG: 20 TABLET, FILM COATED ORAL at 21:30

## 2019-09-24 RX ADMIN — LEVOFLOXACIN 750 MG: 750 TABLET, FILM COATED ORAL at 08:21

## 2019-09-24 RX ADMIN — LEVOTHYROXINE SODIUM 188 MCG: 100 TABLET ORAL at 05:34

## 2019-09-24 RX ADMIN — DOXYCYCLINE HYCLATE 100 MG: 100 CAPSULE ORAL at 08:20

## 2019-09-24 RX ADMIN — LAMOTRIGINE 100 MG: 100 TABLET ORAL at 08:23

## 2019-09-24 RX ADMIN — OXYBUTYNIN CHLORIDE 2.5 MG: 5 TABLET ORAL at 21:22

## 2019-09-24 RX ADMIN — LAMOTRIGINE 100 MG: 100 TABLET ORAL at 21:22

## 2019-09-24 RX ADMIN — OXYBUTYNIN CHLORIDE 2.5 MG: 5 TABLET ORAL at 08:19

## 2019-09-24 RX ADMIN — CARVEDILOL 6.25 MG: 6.25 TABLET, FILM COATED ORAL at 08:30

## 2019-09-24 ASSESSMENT — PAIN DESCRIPTION - LOCATION: LOCATION: LEG

## 2019-09-24 ASSESSMENT — PAIN SCALES - GENERAL
PAINLEVEL_OUTOF10: 6
PAINLEVEL_OUTOF10: 0

## 2019-09-24 ASSESSMENT — PAIN DESCRIPTION - ORIENTATION: ORIENTATION: RIGHT;LEFT

## 2019-09-24 ASSESSMENT — PAIN DESCRIPTION - PAIN TYPE: TYPE: CHRONIC PAIN

## 2019-09-24 NOTE — PLAN OF CARE
Problem: Falls - Risk of:  Goal: Will remain free from falls  Description  Will remain free from falls  9/24/2019 0927 by Derick Wright RN  Outcome: Met This Shift     Problem: Falls - Risk of:  Goal: Absence of physical injury  Description  Absence of physical injury  9/24/2019 0927 by Derick Wright RN  Outcome: Met This Shift

## 2019-09-25 VITALS
DIASTOLIC BLOOD PRESSURE: 72 MMHG | HEART RATE: 74 BPM | WEIGHT: 212.4 LBS | TEMPERATURE: 97.7 F | HEIGHT: 61 IN | OXYGEN SATURATION: 96 % | BODY MASS INDEX: 40.1 KG/M2 | RESPIRATION RATE: 18 BRPM | SYSTOLIC BLOOD PRESSURE: 168 MMHG

## 2019-09-25 PROBLEM — N39.0 URINARY TRACT INFECTION, SITE NOT SPECIFIED: Status: ACTIVE | Noted: 2019-09-25

## 2019-09-25 LAB
ANION GAP SERPL CALCULATED.3IONS-SCNC: 14 MMOL/L (ref 7–16)
BLOOD CULTURE, ROUTINE: NORMAL
BUN BLDV-MCNC: 43 MG/DL (ref 8–23)
CALCIUM SERPL-MCNC: 8.2 MG/DL (ref 8.6–10.2)
CHLORIDE BLD-SCNC: 100 MMOL/L (ref 98–107)
CO2: 24 MMOL/L (ref 22–29)
CREAT SERPL-MCNC: 1.6 MG/DL (ref 0.5–1)
CULTURE, BLOOD 2: NORMAL
GFR AFRICAN AMERICAN: 38
GFR NON-AFRICAN AMERICAN: 31 ML/MIN/1.73
GLUCOSE BLD-MCNC: 109 MG/DL (ref 74–99)
POTASSIUM SERPL-SCNC: 3.9 MMOL/L (ref 3.5–5)
SODIUM BLD-SCNC: 138 MMOL/L (ref 132–146)

## 2019-09-25 PROCEDURE — 6370000000 HC RX 637 (ALT 250 FOR IP): Performed by: FAMILY MEDICINE

## 2019-09-25 PROCEDURE — 80048 BASIC METABOLIC PNL TOTAL CA: CPT

## 2019-09-25 PROCEDURE — 6370000000 HC RX 637 (ALT 250 FOR IP): Performed by: SPECIALIST

## 2019-09-25 PROCEDURE — 36415 COLL VENOUS BLD VENIPUNCTURE: CPT

## 2019-09-25 RX ORDER — SODIUM CHLORIDE 9 MG/ML
INJECTION, SOLUTION INTRAVENOUS CONTINUOUS
Status: DISCONTINUED | OUTPATIENT
Start: 2019-09-25 | End: 2019-09-25 | Stop reason: HOSPADM

## 2019-09-25 RX ADMIN — LEVOTHYROXINE SODIUM 188 MCG: 100 TABLET ORAL at 05:35

## 2019-09-25 RX ADMIN — APIXABAN 5 MG: 5 TABLET, FILM COATED ORAL at 10:13

## 2019-09-25 RX ADMIN — FLUTICASONE PROPIONATE 2 SPRAY: 50 SPRAY, METERED NASAL at 10:18

## 2019-09-25 RX ADMIN — ACETAMINOPHEN 500 MG: 500 TABLET ORAL at 04:08

## 2019-09-25 RX ADMIN — LAMOTRIGINE 100 MG: 100 TABLET ORAL at 10:15

## 2019-09-25 RX ADMIN — CARVEDILOL 6.25 MG: 6.25 TABLET, FILM COATED ORAL at 10:14

## 2019-09-25 RX ADMIN — DOXYCYCLINE HYCLATE 100 MG: 100 CAPSULE ORAL at 10:14

## 2019-09-25 RX ADMIN — OXYBUTYNIN CHLORIDE 2.5 MG: 5 TABLET ORAL at 10:14

## 2019-09-25 ASSESSMENT — PAIN SCALES - GENERAL
PAINLEVEL_OUTOF10: 6
PAINLEVEL_OUTOF10: 0
PAINLEVEL_OUTOF10: 0

## 2019-09-25 NOTE — CARE COORDINATION
Patient currently on a bicarb drip ordered by nephrology. Plan remains to discharge to [facility] once medically ready, no precert required. Nephrology sign off pending for discharge. Ambulette form in envelope in soft chart.   Marian Mendieta RN CM

## 2019-09-25 NOTE — PROGRESS NOTES
ADVANCED CARE PLANNING    Rashid Mojica       :  1945              MRN:  [id number]      Purpose of Encounter: Advanced care planning in light of code status discussion with new dx of renal injury, possible sepsis and elevated troponin. Fall at nursing home   Parties in attendance: :Sandy Luna, Amelia Duran, APRN - CNP, Family members:none present. She speaks about her brother that is POA, states that he came to see her 2-3 weeks aog, but does not see him often. Decisional Capacity:Yes, she answers all questions appropriately and is alert and oriented. She does have  Noted Hx of autistic and MR noted on chart, but patient is capable of all conversation and states that she along with doctors and nurses make her medical decisions. There is no mention of guardianship on paperwork. Diagnosis: Principal Problem:    Sepsis ([facility]  Active Problems:    Seizures ([facility]    Pain of both hip joints    Acute kidney injury superimposed on chronic kidney disease (HCC)    Elevated troponin  Resolved Problems:    * No resolved hospital problems. *    Patients Medical Story: This is a  66 y.o. female who presented to [address] [place] [address] with fall. She has lived at nursing home for over 18 years. PMH as noted below significant for asthma, autism, chronic wound and cellulitis of left leg, blood clots, hypertension, obesity and history of seizures. Patient states she was transferring herself from the toilet to her wheelchair and fell, missing the wheelchair, falling on her tailbone. She complained of hip pain bilaterally. She denied any lightheadedness, chest pain, nausea vomiting or shortness of breath. He is on Eliquis chronically. She states he had difficulty getting up and several people had to help her get up. She did sit on the floor for some duration of time but she is unsure how long this was. Found to have SHAWN, elevated troponin and Sepsis criteria with WBC elevated.  Discussed code status with
Cat scan was called apparently the patient was unable to lay flat for tests and then refused, they tried her twice and were unsuccessful. Will notify charge nurse of this.
Dr. Ab Patterson notified that patient could not tolerate lying flat for CT testing. CT department will send for her if medicated and able to tolerate CT.
Hospitalist Progress Note      Synopsis: Patient admitted on 9/20/2019 for Sepsis [facility]) from left leg cellulitis, SHAWN, s/p mechanical fall      Subjective    Patient seen and examined  Records reviewed. She is agreeable to having a x-ray done of her tailbone today. Planing of hip, knee and ankle pain    Stable overnight. No other overnight issues reported. DIET: DIET CARDIAC; Low Sodium (2 GM)    CODE: DNR-CCA      Intake/Output Summary (Last 24 hours) at 9/23/2019 1601  Last data filed at 9/23/2019 1337  Gross per 24 hour   Intake 2194 ml   Output 900 ml   Net 1294 ml       Exam:  BP (!) 145/76   Pulse 83   Temp 98 °F (36.7 °C) (Temporal)   Resp 18   Ht 5' 1\" (1.549 m)   Wt 202 lb 1.6 oz (91.7 kg)   SpO2 95%   BMI 38.19 kg/m²   General appearance: No apparent distress, appears stated age and cooperative. HEENT:  Conjunctivae/corneas clear. Neck: Supple. No jugular venous distention. Respiratory: Clear to auscultation bilaterally, normal respiratory effort  Cardiovascular: Regular rate rhythm, normal S1-S2  Abdomen: Soft, nontender, nondistended  Musculoskeletal: No clubbing, cyanosis, no bilateral lower extremity edema. Left LE ant shin wound noted. Brisk capillary refill.    Skin:  No rashes  on visible skin  Neurologic: awake, alert and following commands     Medications:  Reviewed    Infusion Medications    IV infusion builder 75 mL/hr at 09/23/19 1458     Scheduled Medications    doxycycline hyclate  100 mg Oral 2 times per day    potassium chloride  20 mEq Oral Once    levofloxacin  750 mg Oral Every Other Day    apixaban  5 mg Oral BID    ARIPiprazole  5 mg Oral Nightly    carvedilol  6.25 mg Oral BID    [Held by provider] DULoxetine  60 mg Oral Daily    fentaNYL  1 patch Transdermal Q72H    fluticasone  2 spray Each Nostril Daily    lamoTRIgine  100 mg Oral BID    oxybutynin  2.5 mg Oral BID    simvastatin  20 mg Oral Nightly    sodium chloride flush  10 mL Intravenous 2
Nephrology was called to see if it is ok for d/c.
Occupational Therapy  OCCUPATIONAL THERAPY INITIAL EVALUATION      Date:2019  Patient Name: Remy Bush  MRN: [id number]  : 1945  Room: [address] [place], [place] [address]    Date of Service: 2019    AM-PAC Daily Activity Raw Score:   Recommended Adaptive Equipment:  TBD   Diagnosis: sepsis- s/p fall off commode- pt c/o B hip pain  Pertinent Medical History: HTN, HLD, MR, OA, seizures, asthma, austitic & arthraglia  Precautions:  Falls & NPO  Home Living: Pt lives alone in a NH for the last 18 years assist in all areas  Pain Level: Pt did c/o pain from a wound on left leg    Cognition: A&O: 2/4; Follows 1 step directions   Memory:  poor   Sequencing:  poor   Problem solving:  poor   Judgement/safety:  poor  Pt is impulsive with activity     Functional Assessment:   Initial Eval Status  Date: 19 Treatment Status  Date: Short Term Goals  Treatment frequency: PRN    Feeding Min A  Sup   Grooming Mod A  Min A   UB Dressing Min A  Sup   LB Dressing Dependent  Max A   Bathing Max A  Mod A   Toileting NT  independent   Bed Mobility  Supine to sit: ModA  Sit to supine: ModA     Supine to sit: Min A  Sit to supine: Min A   Functional Transfers NT  Max A   Functional Mobility na  na   Balance Sitting: Min-Mod A  Standing: NT  Sitting: Min A     Activity Tolerance Fair-  Fair   Visual/  Perceptual Impaired- pt glasses are @ facility                Hand dominance: R   Strength ROM Additional Info:    BUE  3/4 in all planes 3+/5 within available ROM fair  and fair- FMC/dexterity noted during ADL tasks               [facility]  Sensation: unable to assess  Tone: WFL   Edema: none noted                            Comments/Treatment: Upon arrival, patient lying in bed. Pt agreeable to OT session. Therapist facilitated bed mobility (cues for body mechanics, sequencing and attention), unsupported sitting balance EOB (cues for posture, attention, balance), and scooting to [facility] w/ cues for technique.  Therapist facilitated
Physical Therapy    Facility/Department: [address] PICU  Initial Assessment    NAME: Tatum Vann  : 1945  MRN: [id number]    Date of Service: 2019    Evaluating Therapist: Ayesha Montague. George De La Garza PMoisesT. Room #: 6508/6348-X  DIAGNOSIS: Sepsis due to LLE cellulitis, fell off commode  PRECAUTIONS: FALLS, bed/chair alarm, TSM  PMH: mental retardation    Social:  Pt lives at [facility] in a 1 floor plan no steps and no rails to enter. Prior to admission pt walked with ww short distances, but primarily uses WC for mobility. Initial Evaluation  Date: 19 Treatment      Short Term/ Long Term   Goals   Was pt agreeable to Eval/treatment? yes     Does pt have pain? C/o LLE pain     Bed Mobility  Rolling: SBA  Supine to sit: MIN A  Sit to supine: MOD A  Scooting: MIN A  SBA   Transfers Sit to stand: MIN A  Stand to sit: MIN A  Stand pivot: NA  SBA   Ambulation    NA, pt unable at this time due to weakness. 25 feet with ww with MIN A   Stair negotiation: ascended and descended NA  NA   AM-PAC Raw Score  14/24       BLE ROM is WFL. BLE strength is grossly 4-/5 to 4/5. Balance: sitting EOB SBA and standing with ww MIN A  Endurance: fair  Skin was inspected: LLE mid shin to foot is red and swollen       ASSESSMENT  Pt displays functional ability as noted in the objective portion of this evaluation. Comments/Treatment:  Pt has decreased strength and endurance. She reports she walks short distances with ww, but primarily uses WC for mobility. Pt has tight hip flexors and stands with significant flexed trunk posture due to decreased hip extension. Pt sat EOB x 8 min working on core strength and posture and stood first attempt and was incontinent of urine. She sat back down and when she stood second time bed pad under her that was soiled was changed. Pt c/o fatigue with standing and was unable to walk at this time. Pt was left supine in bed L side lying as found with call light in reach and TSM in place.
Pt sent to xray for sacral xrays. Exam explained to  Pt. Pt refused exam and was sent back to room. Nurse notified.
[facility]  [facility]  Progress Note    SUBJECTIVE:  Chief Complaint   Patient presents with    Fall     Fall when transferring to bathroom. c/o bilateral hip pain     The patient has no new complaints today. No nausea or vomiting. She is still complaining of pain in her leg. Review of systems:  As stated above in the chief complaint, otherwise negative. Medications:  Scheduled Meds:   apixaban  5 mg Oral BID    ARIPiprazole  5 mg Oral Nightly    carvedilol  6.25 mg Oral BID    [Held by provider] DULoxetine  60 mg Oral Daily    fentaNYL  1 patch Transdermal Q72H    fluticasone  2 spray Each Nostril Daily    lamoTRIgine  100 mg Oral BID    oxybutynin  2.5 mg Oral BID    simvastatin  20 mg Oral Nightly    sodium chloride flush  10 mL Intravenous 2 times per day    levothyroxine  188 mcg Oral Daily    daptomycin (CUBICIN) IVPB  400 mg Intravenous Q48H     Continuous Infusions:   sodium chloride 125 mL/hr at 19 1630     PRN Meds:guaiFENesin-dextromethorphan, sodium chloride flush, magnesium hydroxide, ondansetron, magnesium sulfate, potassium chloride **OR** potassium alternative oral replacement **OR** potassium chloride, acetaminophen, cyclobenzaprine    OBJECTIVE:  BP (!) 160/60   Pulse 88   Temp 98.2 °F (36.8 °C) (Temporal)   Resp 18   Ht 5' 1\" (1.549 m)   Wt 202 lb 1.6 oz (91.7 kg)   SpO2 95%   BMI 38.19 kg/m²   Temp  Av °F (36.7 °C)  Min: 97.6 °F (36.4 °C)  Max: 98.3 °F (36.8 °C)  Constitutional: The patient is lying in bed. Asleep but arousable. No distress. Skin: Warm and dry. See below. HEENT: No jaundice. No ulcerations. No thrush. Neck: Supple to movements. Chest: Good breath sounds bilaterally. No crackles. Cardiovascular: Sounds rhythmic and regular. Abdomen: Round, soft and benign to palpation. Positive bowel sounds. Extremities: Discoloration over the right leg.   Left leg shows erythema with desquamation on the lower
OH  +++++++++++++++++++++++++++++++++++++++++++++++++  NOTE: This report was transcribed using voice recognition software. Every effort was made to ensure accuracy; however, inadvertent computerized transcription errors may be present.
anemia    Plan:    · Continue IV fluids NS at 125 cc/hour  · Continue to monitor renal function, BMP this afternoon.
nephrology    PLAN:  · Continue Daptomycin  · Follow with you    Nayan Anderson  11:01 AM  9/21/2019

## 2019-09-26 NOTE — DISCHARGE SUMMARY
Hospital Medicine Discharge Summary    Patient ID: Abdi Wolfe      Patient's PCP: Brandon Singh DO    Admit Date: 9/20/2019     Discharge Date:  9/25/2019      Admitting Physician: Yasmeen Guerrero MD     Discharge Physician: Latoya Montague DO      Condition at discharge: Stable    Disposition: [address]    Discharge Diagnoses: Active Hospital Problems    Diagnosis Date Noted    [facility] [A41.9] 09/20/2019     Priority: High    Urinary tract infection, site not specified [N39.0] 09/25/2019     Priority: Medium    Acute kidney injury superimposed on chronic kidney disease ([facility] [N17.9, N18.9] 09/20/2019     Priority: Medium    Fall [W19. XXXA] 09/20/2019     Priority: Medium    Anemia [D64.9] 09/20/2019     Priority: Low    Pain of both hip joints [M25.551, M25.552] 09/20/2019    Elevated troponin [R74.8] 09/20/2019    Autistic disorder [F84.0] 09/20/2019    Leukocytosis [D72.829] 09/20/2019    Chronic anticoagulation [Z79.01] 09/20/2019    History of DVT (deep vein thrombosis) [Z86.718] 09/20/2019    Obesity (BMI 30-39. 9) [E66.9] 09/20/2019    Cellulitis of left leg [L03.116] 08/07/2017    Seizures [facility] [R56.9] 08/06/2012       The patient was seen and examined on day of discharge and this discharge summary is in conjunction with any daily progress note from day of discharge. Admission HPI: Records reviewed. This is a  66 y.o. female who presented to [address] [place] [address] with fall. She has lived at nursing home for over 18 years. PMH as noted below significant for asthma, autism, chronic wound and cellulitis of left leg, blood clots, hypertension, obesity and history of seizures. Patient states she was transferring herself from the toilet to her wheelchair and fell, missing the wheelchair, falling on her tailbone. She complained of hip pain bilaterally. She denied any lightheadedness, chest pain, nausea vomiting or shortness of breath.   He is on Eliquis MG capsule Take 100 mg by mouthHistorical Med      Multiple Vitamins-Minerals (THERAPEUTIC MULTIVITAMIN-MINERALS) tablet Take 1 tablet by mouth daily Ld 10/10/2018Historical Med      triamcinolone (KENALOG) 0.1 % cream Apply topically 2 times daily Apply topically 2 times daily. , Topical, 2 TIMES DAILY, Until Discontinued, Historical Med      simvastatin (ZOCOR) 20 MG tablet Take 20 mg by mouth nightlyHistorical Med      magnesium hydroxide (MILK OF MAGNESIA) 400 MG/5ML suspension Take 30 mLs by mouth daily as needed. acetaminophen (TYLENOL) 500 MG tablet Take 500 mg by mouth every 6 hours as needed. !! - Potential duplicate medications found. Please discuss with provider. Time Spent on discharge is more than 31 min in the examination, evaluation, counseling and review of medications and discharge plan. Signed:    Jose F Lopez DO   9/25/2019      Thank you Baby DO Ramona for the opportunity to be involved in this patient's care. If you have any questions or concerns please feel free to contact me. NOTE: This report was transcribed using voice recognition software. Every effort was made to ensure accuracy; however, inadvertent computerized transcription errors may be present.

## 2019-10-20 PROBLEM — R77.8 ELEVATED TROPONIN: Status: RESOLVED | Noted: 2019-09-20 | Resolved: 2019-10-20

## 2019-10-20 PROBLEM — R79.89 ELEVATED TROPONIN: Status: RESOLVED | Noted: 2019-09-20 | Resolved: 2019-10-20
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
## 2019-12-05 ENCOUNTER — APPOINTMENT (OUTPATIENT)
Dept: GENERAL RADIOLOGY | Age: 74
DRG: 872 | End: 2019-12-05
Payer: MEDICARE

## 2019-12-05 ENCOUNTER — HOSPITAL ENCOUNTER (INPATIENT)
Age: 74
LOS: 6 days | Discharge: HOME | DRG: 872 | End: 2019-12-11
Attending: EMERGENCY MEDICINE | Admitting: INTERNAL MEDICINE
Payer: MEDICARE

## 2019-12-05 DIAGNOSIS — D72.829 LEUKOCYTOSIS, UNSPECIFIED TYPE: ICD-10-CM

## 2019-12-05 DIAGNOSIS — E87.20 LACTIC ACIDOSIS: Primary | ICD-10-CM

## 2019-12-05 DIAGNOSIS — A41.9 SEPTICEMIA (HCC): ICD-10-CM

## 2019-12-05 DIAGNOSIS — L03.116 CELLULITIS OF LEFT LOWER EXTREMITY: ICD-10-CM

## 2019-12-05 PROBLEM — L02.416 CELLULITIS AND ABSCESS OF LEFT LEG: Status: ACTIVE | Noted: 2019-12-05

## 2019-12-05 LAB
ALBUMIN SERPL-MCNC: 3.2 G/DL (ref 3.5–5.2)
ALP BLD-CCNC: 97 U/L (ref 35–104)
ALT SERPL-CCNC: 39 U/L (ref 0–32)
ANION GAP SERPL CALCULATED.3IONS-SCNC: 15 MMOL/L (ref 7–16)
ANISOCYTOSIS: ABNORMAL
AST SERPL-CCNC: 126 U/L (ref 0–31)
BACTERIA: ABNORMAL /HPF
BASOPHILS ABSOLUTE: 0.04 E9/L (ref 0–0.2)
BASOPHILS RELATIVE PERCENT: 0.1 % (ref 0–2)
BILIRUB SERPL-MCNC: 0.3 MG/DL (ref 0–1.2)
BILIRUBIN URINE: ABNORMAL
BLOOD, URINE: ABNORMAL
BUN BLDV-MCNC: 42 MG/DL (ref 8–23)
CALCIUM SERPL-MCNC: 8.9 MG/DL (ref 8.6–10.2)
CASTS: ABNORMAL /LPF
CHLORIDE BLD-SCNC: 101 MMOL/L (ref 98–107)
CLARITY: ABNORMAL
CO2: 22 MMOL/L (ref 22–29)
COLOR: YELLOW
CREAT SERPL-MCNC: 1.5 MG/DL (ref 0.5–1)
EOSINOPHILS ABSOLUTE: 0 E9/L (ref 0.05–0.5)
EOSINOPHILS RELATIVE PERCENT: 0 % (ref 0–6)
EPITHELIAL CELLS, UA: ABNORMAL /HPF
GFR AFRICAN AMERICAN: 41
GFR NON-AFRICAN AMERICAN: 34 ML/MIN/1.73
GLUCOSE BLD-MCNC: 213 MG/DL (ref 74–99)
GLUCOSE URINE: NEGATIVE MG/DL
HCT VFR BLD CALC: 30.5 % (ref 34–48)
HEMOGLOBIN: 9.5 G/DL (ref 11.5–15.5)
HYPOCHROMIA: ABNORMAL
IMMATURE GRANULOCYTES #: 0.84 E9/L
IMMATURE GRANULOCYTES %: 2.8 % (ref 0–5)
KETONES, URINE: ABNORMAL MG/DL
LACTIC ACID: 1.3 MMOL/L (ref 0.5–2.2)
LACTIC ACID: 4.2 MMOL/L (ref 0.5–2.2)
LEUKOCYTE ESTERASE, URINE: NEGATIVE
LYMPHOCYTES ABSOLUTE: 0.9 E9/L (ref 1.5–4)
LYMPHOCYTES RELATIVE PERCENT: 3 % (ref 20–42)
MCH RBC QN AUTO: 31.3 PG (ref 26–35)
MCHC RBC AUTO-ENTMCNC: 31.1 % (ref 32–34.5)
MCV RBC AUTO: 100.3 FL (ref 80–99.9)
MONOCYTES ABSOLUTE: 0.38 E9/L (ref 0.1–0.95)
MONOCYTES RELATIVE PERCENT: 1.3 % (ref 2–12)
NEUTROPHILS ABSOLUTE: 27.9 E9/L (ref 1.8–7.3)
NEUTROPHILS RELATIVE PERCENT: 92.8 % (ref 43–80)
NITRITE, URINE: NEGATIVE
PDW BLD-RTO: 14.4 FL (ref 11.5–15)
PH UA: 5 (ref 5–9)
PLATELET # BLD: 325 E9/L (ref 130–450)
PMV BLD AUTO: 9.9 FL (ref 7–12)
POLYCHROMASIA: ABNORMAL
POTASSIUM SERPL-SCNC: 5.6 MMOL/L (ref 3.5–5)
PROTEIN UA: ABNORMAL MG/DL
RBC # BLD: 3.04 E12/L (ref 3.5–5.5)
RBC UA: ABNORMAL /HPF (ref 0–2)
SEDIMENTATION RATE, ERYTHROCYTE: 110 MM/HR (ref 0–20)
SODIUM BLD-SCNC: 138 MMOL/L (ref 132–146)
SPECIFIC GRAVITY UA: 1.02 (ref 1–1.03)
TOTAL PROTEIN: 7.4 G/DL (ref 6.4–8.3)
UROBILINOGEN, URINE: 0.2 E.U./DL
WBC # BLD: 30.1 E9/L (ref 4.5–11.5)
WBC UA: ABNORMAL /HPF (ref 0–5)

## 2019-12-05 PROCEDURE — 73630 X-RAY EXAM OF FOOT: CPT

## 2019-12-05 PROCEDURE — 71045 X-RAY EXAM CHEST 1 VIEW: CPT

## 2019-12-05 PROCEDURE — 87088 URINE BACTERIA CULTURE: CPT

## 2019-12-05 PROCEDURE — 73590 X-RAY EXAM OF LOWER LEG: CPT

## 2019-12-05 PROCEDURE — 2060000000 HC ICU INTERMEDIATE R&B

## 2019-12-05 PROCEDURE — 6370000000 HC RX 637 (ALT 250 FOR IP): Performed by: SPECIALIST

## 2019-12-05 PROCEDURE — 6360000002 HC RX W HCPCS: Performed by: STUDENT IN AN ORGANIZED HEALTH CARE EDUCATION/TRAINING PROGRAM

## 2019-12-05 PROCEDURE — 2580000003 HC RX 258: Performed by: STUDENT IN AN ORGANIZED HEALTH CARE EDUCATION/TRAINING PROGRAM

## 2019-12-05 PROCEDURE — 96374 THER/PROPH/DIAG INJ IV PUSH: CPT

## 2019-12-05 PROCEDURE — 6370000000 HC RX 637 (ALT 250 FOR IP): Performed by: INTERNAL MEDICINE

## 2019-12-05 PROCEDURE — 99285 EMERGENCY DEPT VISIT HI MDM: CPT

## 2019-12-05 PROCEDURE — 6360000002 HC RX W HCPCS: Performed by: SPECIALIST

## 2019-12-05 PROCEDURE — 85651 RBC SED RATE NONAUTOMATED: CPT

## 2019-12-05 PROCEDURE — 87040 BLOOD CULTURE FOR BACTERIA: CPT

## 2019-12-05 PROCEDURE — 36415 COLL VENOUS BLD VENIPUNCTURE: CPT

## 2019-12-05 PROCEDURE — 81001 URINALYSIS AUTO W/SCOPE: CPT

## 2019-12-05 PROCEDURE — 80053 COMPREHEN METABOLIC PANEL: CPT

## 2019-12-05 PROCEDURE — 93005 ELECTROCARDIOGRAM TRACING: CPT | Performed by: STUDENT IN AN ORGANIZED HEALTH CARE EDUCATION/TRAINING PROGRAM

## 2019-12-05 PROCEDURE — 83605 ASSAY OF LACTIC ACID: CPT

## 2019-12-05 PROCEDURE — 85025 COMPLETE CBC W/AUTO DIFF WBC: CPT

## 2019-12-05 RX ORDER — GLY/DIMETH/PETROLAT,WHT/WATER
CREAM (GRAM) TOPICAL PRN
COMMUNITY
End: 2022-09-21

## 2019-12-05 RX ORDER — IPRATROPIUM BROMIDE AND ALBUTEROL SULFATE 2.5; .5 MG/3ML; MG/3ML
1 SOLUTION RESPIRATORY (INHALATION) EVERY 4 HOURS PRN
Status: DISCONTINUED | OUTPATIENT
Start: 2019-12-05 | End: 2019-12-11 | Stop reason: HOSPADM

## 2019-12-05 RX ORDER — FLUTICASONE PROPIONATE 50 MCG
2 SPRAY, SUSPENSION (ML) NASAL DAILY PRN
Status: DISCONTINUED | OUTPATIENT
Start: 2019-12-05 | End: 2019-12-11 | Stop reason: HOSPADM

## 2019-12-05 RX ORDER — SODIUM CHLORIDE 0.9 % (FLUSH) 0.9 %
10 SYRINGE (ML) INJECTION EVERY 12 HOURS SCHEDULED
Status: DISCONTINUED | OUTPATIENT
Start: 2019-12-05 | End: 2019-12-11 | Stop reason: HOSPADM

## 2019-12-05 RX ORDER — LEVOTHYROXINE SODIUM 88 UG/1
88 TABLET ORAL DAILY
Status: DISCONTINUED | OUTPATIENT
Start: 2019-12-06 | End: 2019-12-11 | Stop reason: HOSPADM

## 2019-12-05 RX ORDER — PHENYTOIN SODIUM 100 MG/1
400 CAPSULE, EXTENDED RELEASE ORAL NIGHTLY
Status: DISCONTINUED | OUTPATIENT
Start: 2019-12-05 | End: 2019-12-11 | Stop reason: HOSPADM

## 2019-12-05 RX ORDER — LEVOTHYROXINE SODIUM 0.1 MG/1
100 TABLET ORAL DAILY
Status: DISCONTINUED | OUTPATIENT
Start: 2019-12-06 | End: 2019-12-11 | Stop reason: HOSPADM

## 2019-12-05 RX ORDER — LAMOTRIGINE 100 MG/1
100 TABLET ORAL 2 TIMES DAILY
Status: DISCONTINUED | OUTPATIENT
Start: 2019-12-05 | End: 2019-12-11 | Stop reason: HOSPADM

## 2019-12-05 RX ORDER — ARIPIPRAZOLE 5 MG/1
5 TABLET ORAL NIGHTLY
Status: DISCONTINUED | OUTPATIENT
Start: 2019-12-05 | End: 2019-12-11 | Stop reason: HOSPADM

## 2019-12-05 RX ORDER — 0.9 % SODIUM CHLORIDE 0.9 %
1000 INTRAVENOUS SOLUTION INTRAVENOUS ONCE
Status: COMPLETED | OUTPATIENT
Start: 2019-12-05 | End: 2019-12-05

## 2019-12-05 RX ORDER — IBUPROFEN 400 MG/1
400 TABLET ORAL EVERY 6 HOURS PRN
COMMUNITY
End: 2022-09-21

## 2019-12-05 RX ORDER — LISINOPRIL 10 MG/1
10 TABLET ORAL DAILY
COMMUNITY
End: 2020-01-20 | Stop reason: ALTCHOICE

## 2019-12-05 RX ORDER — OXYBUTYNIN CHLORIDE 5 MG/1
2.5 TABLET ORAL 2 TIMES DAILY
Status: DISCONTINUED | OUTPATIENT
Start: 2019-12-05 | End: 2019-12-11 | Stop reason: HOSPADM

## 2019-12-05 RX ORDER — LISINOPRIL 10 MG/1
10 TABLET ORAL DAILY
Status: DISCONTINUED | OUTPATIENT
Start: 2019-12-05 | End: 2019-12-11 | Stop reason: HOSPADM

## 2019-12-05 RX ORDER — IPRATROPIUM BROMIDE AND ALBUTEROL SULFATE 2.5; .5 MG/3ML; MG/3ML
1 SOLUTION RESPIRATORY (INHALATION) EVERY 4 HOURS PRN
COMMUNITY
End: 2022-09-21

## 2019-12-05 RX ORDER — DIPHENHYDRAMINE HYDROCHLORIDE 50 MG/ML
25 INJECTION INTRAMUSCULAR; INTRAVENOUS ONCE
Status: COMPLETED | OUTPATIENT
Start: 2019-12-05 | End: 2019-12-05

## 2019-12-05 RX ORDER — DOXYCYCLINE HYCLATE 100 MG/1
100 CAPSULE ORAL EVERY 12 HOURS SCHEDULED
Status: DISCONTINUED | OUTPATIENT
Start: 2019-12-05 | End: 2019-12-06

## 2019-12-05 RX ORDER — MONTELUKAST SODIUM 10 MG/1
10 TABLET ORAL NIGHTLY
Status: DISCONTINUED | OUTPATIENT
Start: 2019-12-05 | End: 2019-12-11 | Stop reason: HOSPADM

## 2019-12-05 RX ORDER — DULOXETIN HYDROCHLORIDE 60 MG/1
60 CAPSULE, DELAYED RELEASE ORAL DAILY
Status: DISCONTINUED | OUTPATIENT
Start: 2019-12-05 | End: 2019-12-11 | Stop reason: HOSPADM

## 2019-12-05 RX ORDER — FENTANYL 75 UG/H
1 PATCH TRANSDERMAL
Status: DISCONTINUED | OUTPATIENT
Start: 2019-12-06 | End: 2019-12-11 | Stop reason: HOSPADM

## 2019-12-05 RX ORDER — ACETAMINOPHEN 325 MG/1
650 TABLET ORAL EVERY 4 HOURS PRN
Status: DISCONTINUED | OUTPATIENT
Start: 2019-12-05 | End: 2019-12-11 | Stop reason: HOSPADM

## 2019-12-05 RX ORDER — SODIUM CHLORIDE 0.9 % (FLUSH) 0.9 %
10 SYRINGE (ML) INJECTION PRN
Status: DISCONTINUED | OUTPATIENT
Start: 2019-12-05 | End: 2019-12-11 | Stop reason: HOSPADM

## 2019-12-05 RX ORDER — LEVOFLOXACIN 5 MG/ML
750 INJECTION, SOLUTION INTRAVENOUS ONCE
Status: COMPLETED | OUTPATIENT
Start: 2019-12-05 | End: 2019-12-05

## 2019-12-05 RX ORDER — AMMONIUM LACTATE 12 G/100G
CREAM TOPICAL PRN
COMMUNITY
End: 2020-01-20 | Stop reason: ALTCHOICE

## 2019-12-05 RX ORDER — CEFAZOLIN SODIUM 2 G/50ML
2 SOLUTION INTRAVENOUS EVERY 12 HOURS
Status: DISCONTINUED | OUTPATIENT
Start: 2019-12-05 | End: 2019-12-06

## 2019-12-05 RX ORDER — ONDANSETRON 2 MG/ML
4 INJECTION INTRAMUSCULAR; INTRAVENOUS EVERY 6 HOURS PRN
Status: DISCONTINUED | OUTPATIENT
Start: 2019-12-05 | End: 2019-12-11 | Stop reason: HOSPADM

## 2019-12-05 RX ORDER — CARVEDILOL 6.25 MG/1
6.25 TABLET ORAL 2 TIMES DAILY
Status: DISCONTINUED | OUTPATIENT
Start: 2019-12-05 | End: 2019-12-11 | Stop reason: HOSPADM

## 2019-12-05 RX ADMIN — APIXABAN 5 MG: 5 TABLET, FILM COATED ORAL at 22:39

## 2019-12-05 RX ADMIN — ARIPIPRAZOLE 5 MG: 5 TABLET ORAL at 22:59

## 2019-12-05 RX ADMIN — VANCOMYCIN HYDROCHLORIDE 2000 MG: 10 INJECTION, POWDER, LYOPHILIZED, FOR SOLUTION INTRAVENOUS at 16:49

## 2019-12-05 RX ADMIN — DOXYCYCLINE HYCLATE 100 MG: 100 CAPSULE ORAL at 22:39

## 2019-12-05 RX ADMIN — LEVOFLOXACIN 750 MG: 5 INJECTION, SOLUTION INTRAVENOUS at 15:38

## 2019-12-05 RX ADMIN — CARVEDILOL 6.25 MG: 6.25 TABLET, FILM COATED ORAL at 22:39

## 2019-12-05 RX ADMIN — LAMOTRIGINE 100 MG: 100 TABLET ORAL at 22:38

## 2019-12-05 RX ADMIN — SODIUM CHLORIDE 1000 ML: 9 INJECTION, SOLUTION INTRAVENOUS at 15:45

## 2019-12-05 RX ADMIN — MONTELUKAST SODIUM 10 MG: 10 TABLET ORAL at 22:39

## 2019-12-05 RX ADMIN — DIPHENHYDRAMINE HYDROCHLORIDE 25 MG: 50 INJECTION, SOLUTION INTRAMUSCULAR; INTRAVENOUS at 22:42

## 2019-12-05 RX ADMIN — SODIUM CHLORIDE 1000 ML: 9 INJECTION, SOLUTION INTRAVENOUS at 14:03

## 2019-12-05 RX ADMIN — OXYBUTYNIN CHLORIDE 2.5 MG: 5 TABLET ORAL at 22:56

## 2019-12-05 RX ADMIN — PHENYTOIN SODIUM 400 MG: 100 CAPSULE, EXTENDED RELEASE ORAL at 22:38

## 2019-12-05 RX ADMIN — CEFAZOLIN SODIUM 2 G: 2 SOLUTION INTRAVENOUS at 22:41

## 2019-12-05 ASSESSMENT — ENCOUNTER SYMPTOMS
DIARRHEA: 0
CHEST TIGHTNESS: 0
BLOOD IN STOOL: 0
SORE THROAT: 0
COUGH: 0
SHORTNESS OF BREATH: 0
VOMITING: 0
WHEEZING: 0
BACK PAIN: 0
ABDOMINAL PAIN: 0
CONSTIPATION: 0
NAUSEA: 0
RHINORRHEA: 0

## 2019-12-06 ENCOUNTER — APPOINTMENT (OUTPATIENT)
Dept: CT IMAGING | Age: 74
DRG: 872 | End: 2019-12-06
Payer: MEDICARE

## 2019-12-06 LAB
ALBUMIN SERPL-MCNC: 2.4 G/DL (ref 3.5–5.2)
ALP BLD-CCNC: 78 U/L (ref 35–104)
ALT SERPL-CCNC: 32 U/L (ref 0–32)
ANION GAP SERPL CALCULATED.3IONS-SCNC: 9 MMOL/L (ref 7–16)
ANISOCYTOSIS: ABNORMAL
AST SERPL-CCNC: 100 U/L (ref 0–31)
BASOPHILS ABSOLUTE: 0 E9/L (ref 0–0.2)
BASOPHILS RELATIVE PERCENT: 0.2 % (ref 0–2)
BILIRUB SERPL-MCNC: <0.2 MG/DL (ref 0–1.2)
BILIRUBIN DIRECT: <0.2 MG/DL (ref 0–0.3)
BILIRUBIN, INDIRECT: ABNORMAL MG/DL (ref 0–1)
BUN BLDV-MCNC: 43 MG/DL (ref 8–23)
C-REACTIVE PROTEIN: 29.2 MG/DL (ref 0–0.4)
CALCIUM SERPL-MCNC: 8.3 MG/DL (ref 8.6–10.2)
CHLORIDE BLD-SCNC: 103 MMOL/L (ref 98–107)
CO2: 24 MMOL/L (ref 22–29)
CREAT SERPL-MCNC: 1.4 MG/DL (ref 0.5–1)
EKG ATRIAL RATE: 91 BPM
EKG P AXIS: 63 DEGREES
EKG P-R INTERVAL: 172 MS
EKG Q-T INTERVAL: 382 MS
EKG QRS DURATION: 74 MS
EKG QTC CALCULATION (BAZETT): 469 MS
EKG R AXIS: 40 DEGREES
EKG T AXIS: 43 DEGREES
EKG VENTRICULAR RATE: 91 BPM
EOSINOPHILS ABSOLUTE: 0 E9/L (ref 0.05–0.5)
EOSINOPHILS RELATIVE PERCENT: 0.1 % (ref 0–6)
GFR AFRICAN AMERICAN: 44
GFR NON-AFRICAN AMERICAN: 36 ML/MIN/1.73
GLUCOSE BLD-MCNC: 105 MG/DL (ref 74–99)
HCT VFR BLD CALC: 26.8 % (ref 34–48)
HEMOGLOBIN: 8.7 G/DL (ref 11.5–15.5)
LYMPHOCYTES ABSOLUTE: 0.68 E9/L (ref 1.5–4)
LYMPHOCYTES RELATIVE PERCENT: 2.6 % (ref 20–42)
MCH RBC QN AUTO: 32.2 PG (ref 26–35)
MCHC RBC AUTO-ENTMCNC: 32.5 % (ref 32–34.5)
MCV RBC AUTO: 99.3 FL (ref 80–99.9)
MONOCYTES ABSOLUTE: 0.68 E9/L (ref 0.1–0.95)
MONOCYTES RELATIVE PERCENT: 2.6 % (ref 2–12)
NEUTROPHILS ABSOLUTE: 21.38 E9/L (ref 1.8–7.3)
NEUTROPHILS RELATIVE PERCENT: 94.8 % (ref 43–80)
PDW BLD-RTO: 14.4 FL (ref 11.5–15)
PLATELET # BLD: 256 E9/L (ref 130–450)
PMV BLD AUTO: 9.6 FL (ref 7–12)
POTASSIUM REFLEX MAGNESIUM: 4.7 MMOL/L (ref 3.5–5)
RBC # BLD: 2.7 E12/L (ref 3.5–5.5)
SEDIMENTATION RATE, ERYTHROCYTE: 100 MM/HR (ref 0–20)
SODIUM BLD-SCNC: 136 MMOL/L (ref 132–146)
TOTAL PROTEIN: 5.9 G/DL (ref 6.4–8.3)
WBC # BLD: 22.5 E9/L (ref 4.5–11.5)

## 2019-12-06 PROCEDURE — 73700 CT LOWER EXTREMITY W/O DYE: CPT

## 2019-12-06 PROCEDURE — 86140 C-REACTIVE PROTEIN: CPT

## 2019-12-06 PROCEDURE — 6370000000 HC RX 637 (ALT 250 FOR IP): Performed by: SPECIALIST

## 2019-12-06 PROCEDURE — 85651 RBC SED RATE NONAUTOMATED: CPT

## 2019-12-06 PROCEDURE — 36415 COLL VENOUS BLD VENIPUNCTURE: CPT

## 2019-12-06 PROCEDURE — 6360000002 HC RX W HCPCS: Performed by: SPECIALIST

## 2019-12-06 PROCEDURE — 2580000003 HC RX 258: Performed by: INTERNAL MEDICINE

## 2019-12-06 PROCEDURE — 85025 COMPLETE CBC W/AUTO DIFF WBC: CPT

## 2019-12-06 PROCEDURE — 6370000000 HC RX 637 (ALT 250 FOR IP): Performed by: INTERNAL MEDICINE

## 2019-12-06 PROCEDURE — 2580000003 HC RX 258: Performed by: SPECIALIST

## 2019-12-06 PROCEDURE — 6360000002 HC RX W HCPCS: Performed by: INTERNAL MEDICINE

## 2019-12-06 PROCEDURE — 2060000000 HC ICU INTERMEDIATE R&B

## 2019-12-06 PROCEDURE — 80048 BASIC METABOLIC PNL TOTAL CA: CPT

## 2019-12-06 PROCEDURE — 80076 HEPATIC FUNCTION PANEL: CPT

## 2019-12-06 PROCEDURE — 93010 ELECTROCARDIOGRAM REPORT: CPT | Performed by: INTERNAL MEDICINE

## 2019-12-06 RX ADMIN — Medication 10 ML: at 09:15

## 2019-12-06 RX ADMIN — MONTELUKAST SODIUM 10 MG: 10 TABLET ORAL at 21:01

## 2019-12-06 RX ADMIN — OXYBUTYNIN CHLORIDE 2.5 MG: 5 TABLET ORAL at 21:12

## 2019-12-06 RX ADMIN — Medication 10 ML: at 21:15

## 2019-12-06 RX ADMIN — APIXABAN 5 MG: 5 TABLET, FILM COATED ORAL at 20:57

## 2019-12-06 RX ADMIN — ACETAMINOPHEN 650 MG: 325 TABLET, FILM COATED ORAL at 01:45

## 2019-12-06 RX ADMIN — ARIPIPRAZOLE 5 MG: 5 TABLET ORAL at 20:58

## 2019-12-06 RX ADMIN — FLUTICASONE PROPIONATE 2 SPRAY: 50 SPRAY, METERED NASAL at 09:14

## 2019-12-06 RX ADMIN — MEROPENEM 1 G: 1 INJECTION, POWDER, FOR SOLUTION INTRAVENOUS at 14:09

## 2019-12-06 RX ADMIN — DAPTOMYCIN 400 MG: 500 INJECTION, POWDER, LYOPHILIZED, FOR SOLUTION INTRAVENOUS at 15:17

## 2019-12-06 RX ADMIN — CARVEDILOL 6.25 MG: 6.25 TABLET, FILM COATED ORAL at 09:13

## 2019-12-06 RX ADMIN — PHENYTOIN SODIUM 400 MG: 100 CAPSULE, EXTENDED RELEASE ORAL at 21:01

## 2019-12-06 RX ADMIN — CARVEDILOL 6.25 MG: 6.25 TABLET, FILM COATED ORAL at 21:11

## 2019-12-06 RX ADMIN — OXYBUTYNIN CHLORIDE 2.5 MG: 5 TABLET ORAL at 09:13

## 2019-12-06 RX ADMIN — HYDROMORPHONE HYDROCHLORIDE 1 MG: 1 INJECTION, SOLUTION INTRAMUSCULAR; INTRAVENOUS; SUBCUTANEOUS at 06:51

## 2019-12-06 RX ADMIN — DULOXETINE HYDROCHLORIDE 60 MG: 60 CAPSULE, DELAYED RELEASE ORAL at 09:13

## 2019-12-06 RX ADMIN — LAMOTRIGINE 100 MG: 100 TABLET ORAL at 21:01

## 2019-12-06 RX ADMIN — LAMOTRIGINE 100 MG: 100 TABLET ORAL at 09:13

## 2019-12-06 RX ADMIN — APIXABAN 5 MG: 5 TABLET, FILM COATED ORAL at 09:14

## 2019-12-06 RX ADMIN — DOXYCYCLINE HYCLATE 100 MG: 100 CAPSULE ORAL at 09:13

## 2019-12-06 RX ADMIN — LEVOTHYROXINE SODIUM 88 MCG: 100 TABLET ORAL at 06:27

## 2019-12-06 RX ADMIN — LISINOPRIL 10 MG: 10 TABLET ORAL at 09:14

## 2019-12-06 RX ADMIN — LEVOTHYROXINE SODIUM 100 MCG: 100 TABLET ORAL at 06:27

## 2019-12-06 RX ADMIN — Medication 10 ML: at 00:01

## 2019-12-06 RX ADMIN — CEFAZOLIN SODIUM 2 G: 2 SOLUTION INTRAVENOUS at 11:16

## 2019-12-06 ASSESSMENT — PAIN SCALES - GENERAL
PAINLEVEL_OUTOF10: 0
PAINLEVEL_OUTOF10: 9
PAINLEVEL_OUTOF10: 8
PAINLEVEL_OUTOF10: 9

## 2019-12-07 LAB — URINE CULTURE, ROUTINE: NORMAL

## 2019-12-07 PROCEDURE — 6370000000 HC RX 637 (ALT 250 FOR IP): Performed by: INTERNAL MEDICINE

## 2019-12-07 PROCEDURE — 2580000003 HC RX 258: Performed by: SPECIALIST

## 2019-12-07 PROCEDURE — 2060000000 HC ICU INTERMEDIATE R&B

## 2019-12-07 PROCEDURE — 6360000002 HC RX W HCPCS: Performed by: INTERNAL MEDICINE

## 2019-12-07 PROCEDURE — 2580000003 HC RX 258: Performed by: INTERNAL MEDICINE

## 2019-12-07 PROCEDURE — 6360000002 HC RX W HCPCS: Performed by: SPECIALIST

## 2019-12-07 RX ADMIN — LEVOTHYROXINE SODIUM 100 MCG: 100 TABLET ORAL at 06:35

## 2019-12-07 RX ADMIN — CARVEDILOL 6.25 MG: 6.25 TABLET, FILM COATED ORAL at 09:30

## 2019-12-07 RX ADMIN — Medication 10 ML: at 09:30

## 2019-12-07 RX ADMIN — LAMOTRIGINE 100 MG: 100 TABLET ORAL at 09:30

## 2019-12-07 RX ADMIN — LISINOPRIL 10 MG: 10 TABLET ORAL at 09:30

## 2019-12-07 RX ADMIN — MONTELUKAST SODIUM 10 MG: 10 TABLET ORAL at 21:34

## 2019-12-07 RX ADMIN — PHENYTOIN SODIUM 400 MG: 100 CAPSULE, EXTENDED RELEASE ORAL at 21:33

## 2019-12-07 RX ADMIN — LAMOTRIGINE 100 MG: 100 TABLET ORAL at 21:34

## 2019-12-07 RX ADMIN — OXYBUTYNIN CHLORIDE 2.5 MG: 5 TABLET ORAL at 09:30

## 2019-12-07 RX ADMIN — MEROPENEM 1 G: 1 INJECTION, POWDER, FOR SOLUTION INTRAVENOUS at 13:16

## 2019-12-07 RX ADMIN — Medication 10 ML: at 21:34

## 2019-12-07 RX ADMIN — DULOXETINE HYDROCHLORIDE 60 MG: 60 CAPSULE, DELAYED RELEASE ORAL at 09:30

## 2019-12-07 RX ADMIN — MEROPENEM 1 G: 1 INJECTION, POWDER, FOR SOLUTION INTRAVENOUS at 01:06

## 2019-12-07 RX ADMIN — HYDROMORPHONE HYDROCHLORIDE 0.5 MG: 1 INJECTION, SOLUTION INTRAMUSCULAR; INTRAVENOUS; SUBCUTANEOUS at 01:49

## 2019-12-07 RX ADMIN — LEVOTHYROXINE SODIUM 88 MCG: 100 TABLET ORAL at 06:35

## 2019-12-07 RX ADMIN — DAPTOMYCIN 400 MG: 500 INJECTION, POWDER, LYOPHILIZED, FOR SOLUTION INTRAVENOUS at 16:28

## 2019-12-07 RX ADMIN — CARVEDILOL 6.25 MG: 6.25 TABLET, FILM COATED ORAL at 21:34

## 2019-12-07 RX ADMIN — HYDROMORPHONE HYDROCHLORIDE 0.5 MG: 1 INJECTION, SOLUTION INTRAMUSCULAR; INTRAVENOUS; SUBCUTANEOUS at 13:15

## 2019-12-07 RX ADMIN — ARIPIPRAZOLE 5 MG: 5 TABLET ORAL at 21:34

## 2019-12-07 RX ADMIN — APIXABAN 5 MG: 5 TABLET, FILM COATED ORAL at 09:30

## 2019-12-07 RX ADMIN — APIXABAN 5 MG: 5 TABLET, FILM COATED ORAL at 21:39

## 2019-12-07 RX ADMIN — OXYBUTYNIN CHLORIDE 2.5 MG: 5 TABLET ORAL at 21:33

## 2019-12-07 ASSESSMENT — PAIN SCALES - GENERAL
PAINLEVEL_OUTOF10: 0
PAINLEVEL_OUTOF10: 0
PAINLEVEL_OUTOF10: 8

## 2019-12-08 LAB
ANION GAP SERPL CALCULATED.3IONS-SCNC: 9 MMOL/L (ref 7–16)
BASOPHILS ABSOLUTE: 0.02 E9/L (ref 0–0.2)
BASOPHILS RELATIVE PERCENT: 0.3 % (ref 0–2)
BUN BLDV-MCNC: 36 MG/DL (ref 8–23)
C-REACTIVE PROTEIN: 15.2 MG/DL (ref 0–0.4)
CALCIUM SERPL-MCNC: 8.2 MG/DL (ref 8.6–10.2)
CHLORIDE BLD-SCNC: 104 MMOL/L (ref 98–107)
CO2: 22 MMOL/L (ref 22–29)
CREAT SERPL-MCNC: 1.1 MG/DL (ref 0.5–1)
EOSINOPHILS ABSOLUTE: 0.22 E9/L (ref 0.05–0.5)
EOSINOPHILS RELATIVE PERCENT: 3.2 % (ref 0–6)
GFR AFRICAN AMERICAN: 58
GFR NON-AFRICAN AMERICAN: 48 ML/MIN/1.73
GLUCOSE BLD-MCNC: 94 MG/DL (ref 74–99)
HCT VFR BLD CALC: 27.2 % (ref 34–48)
HEMOGLOBIN: 8.6 G/DL (ref 11.5–15.5)
IMMATURE GRANULOCYTES #: 0.04 E9/L
IMMATURE GRANULOCYTES %: 0.6 % (ref 0–5)
LYMPHOCYTES ABSOLUTE: 1.58 E9/L (ref 1.5–4)
LYMPHOCYTES RELATIVE PERCENT: 23.3 % (ref 20–42)
MAGNESIUM: 2.2 MG/DL (ref 1.6–2.6)
MCH RBC QN AUTO: 32.3 PG (ref 26–35)
MCHC RBC AUTO-ENTMCNC: 31.6 % (ref 32–34.5)
MCV RBC AUTO: 102.3 FL (ref 80–99.9)
MONOCYTES ABSOLUTE: 0.44 E9/L (ref 0.1–0.95)
MONOCYTES RELATIVE PERCENT: 6.5 % (ref 2–12)
NEUTROPHILS ABSOLUTE: 4.48 E9/L (ref 1.8–7.3)
NEUTROPHILS RELATIVE PERCENT: 66.1 % (ref 43–80)
PDW BLD-RTO: 14 FL (ref 11.5–15)
PLATELET # BLD: 230 E9/L (ref 130–450)
PMV BLD AUTO: 9.6 FL (ref 7–12)
POLYCHROMASIA: ABNORMAL
POTASSIUM SERPL-SCNC: 5.3 MMOL/L (ref 3.5–5)
RBC # BLD: 2.66 E12/L (ref 3.5–5.5)
SEDIMENTATION RATE, ERYTHROCYTE: 105 MM/HR (ref 0–20)
SODIUM BLD-SCNC: 135 MMOL/L (ref 132–146)
WBC # BLD: 6.8 E9/L (ref 4.5–11.5)

## 2019-12-08 PROCEDURE — 2060000000 HC ICU INTERMEDIATE R&B

## 2019-12-08 PROCEDURE — 85025 COMPLETE CBC W/AUTO DIFF WBC: CPT

## 2019-12-08 PROCEDURE — 80048 BASIC METABOLIC PNL TOTAL CA: CPT

## 2019-12-08 PROCEDURE — 83735 ASSAY OF MAGNESIUM: CPT

## 2019-12-08 PROCEDURE — 2580000003 HC RX 258: Performed by: INTERNAL MEDICINE

## 2019-12-08 PROCEDURE — 2580000003 HC RX 258: Performed by: SPECIALIST

## 2019-12-08 PROCEDURE — 36415 COLL VENOUS BLD VENIPUNCTURE: CPT

## 2019-12-08 PROCEDURE — 6370000000 HC RX 637 (ALT 250 FOR IP): Performed by: INTERNAL MEDICINE

## 2019-12-08 PROCEDURE — 85651 RBC SED RATE NONAUTOMATED: CPT

## 2019-12-08 PROCEDURE — 86140 C-REACTIVE PROTEIN: CPT

## 2019-12-08 PROCEDURE — 6360000002 HC RX W HCPCS: Performed by: SPECIALIST

## 2019-12-08 PROCEDURE — 6360000002 HC RX W HCPCS: Performed by: INTERNAL MEDICINE

## 2019-12-08 RX ORDER — SODIUM POLYSTYRENE SULFONATE 15 G/60ML
15 SUSPENSION ORAL; RECTAL ONCE
Status: COMPLETED | OUTPATIENT
Start: 2019-12-08 | End: 2019-12-08

## 2019-12-08 RX ADMIN — ARIPIPRAZOLE 5 MG: 5 TABLET ORAL at 19:40

## 2019-12-08 RX ADMIN — LISINOPRIL 10 MG: 10 TABLET ORAL at 08:55

## 2019-12-08 RX ADMIN — DAPTOMYCIN 400 MG: 500 INJECTION, POWDER, LYOPHILIZED, FOR SOLUTION INTRAVENOUS at 17:01

## 2019-12-08 RX ADMIN — APIXABAN 5 MG: 5 TABLET, FILM COATED ORAL at 19:40

## 2019-12-08 RX ADMIN — MEROPENEM 1 G: 1 INJECTION, POWDER, FOR SOLUTION INTRAVENOUS at 01:45

## 2019-12-08 RX ADMIN — MONTELUKAST SODIUM 10 MG: 10 TABLET ORAL at 19:39

## 2019-12-08 RX ADMIN — MEROPENEM 1 G: 1 INJECTION, POWDER, FOR SOLUTION INTRAVENOUS at 14:25

## 2019-12-08 RX ADMIN — OXYBUTYNIN CHLORIDE 2.5 MG: 5 TABLET ORAL at 19:42

## 2019-12-08 RX ADMIN — Medication 10 ML: at 19:40

## 2019-12-08 RX ADMIN — DULOXETINE HYDROCHLORIDE 60 MG: 60 CAPSULE, DELAYED RELEASE ORAL at 08:55

## 2019-12-08 RX ADMIN — LAMOTRIGINE 100 MG: 100 TABLET ORAL at 19:39

## 2019-12-08 RX ADMIN — LEVOTHYROXINE SODIUM 100 MCG: 100 TABLET ORAL at 06:39

## 2019-12-08 RX ADMIN — CARVEDILOL 6.25 MG: 6.25 TABLET, FILM COATED ORAL at 08:55

## 2019-12-08 RX ADMIN — HYDROMORPHONE HYDROCHLORIDE 1 MG: 1 INJECTION, SOLUTION INTRAMUSCULAR; INTRAVENOUS; SUBCUTANEOUS at 08:56

## 2019-12-08 RX ADMIN — HYDROMORPHONE HYDROCHLORIDE 1 MG: 1 INJECTION, SOLUTION INTRAMUSCULAR; INTRAVENOUS; SUBCUTANEOUS at 19:39

## 2019-12-08 RX ADMIN — LAMOTRIGINE 100 MG: 100 TABLET ORAL at 08:55

## 2019-12-08 RX ADMIN — Medication 10 ML: at 08:56

## 2019-12-08 RX ADMIN — CARVEDILOL 6.25 MG: 6.25 TABLET, FILM COATED ORAL at 19:40

## 2019-12-08 RX ADMIN — APIXABAN 5 MG: 5 TABLET, FILM COATED ORAL at 08:55

## 2019-12-08 RX ADMIN — LEVOTHYROXINE SODIUM 88 MCG: 100 TABLET ORAL at 06:39

## 2019-12-08 RX ADMIN — SODIUM POLYSTYRENE SULFONATE 15 G: 15 SUSPENSION ORAL; RECTAL at 10:29

## 2019-12-08 RX ADMIN — HYDROMORPHONE HYDROCHLORIDE 1 MG: 1 INJECTION, SOLUTION INTRAMUSCULAR; INTRAVENOUS; SUBCUTANEOUS at 01:45

## 2019-12-08 RX ADMIN — OXYBUTYNIN CHLORIDE 2.5 MG: 5 TABLET ORAL at 08:55

## 2019-12-08 RX ADMIN — PHENYTOIN SODIUM 400 MG: 100 CAPSULE, EXTENDED RELEASE ORAL at 19:39

## 2019-12-08 ASSESSMENT — PAIN SCALES - GENERAL
PAINLEVEL_OUTOF10: 8
PAINLEVEL_OUTOF10: 7
PAINLEVEL_OUTOF10: 10

## 2019-12-08 ASSESSMENT — PAIN DESCRIPTION - LOCATION: LOCATION: LEG

## 2019-12-08 ASSESSMENT — PAIN DESCRIPTION - PAIN TYPE: TYPE: ACUTE PAIN

## 2019-12-08 ASSESSMENT — PAIN DESCRIPTION - ORIENTATION: ORIENTATION: LEFT;RIGHT

## 2019-12-08 ASSESSMENT — PAIN DESCRIPTION - DESCRIPTORS: DESCRIPTORS: DISCOMFORT;CONSTANT;ACHING

## 2019-12-09 LAB
ANION GAP SERPL CALCULATED.3IONS-SCNC: 12 MMOL/L (ref 7–16)
BASOPHILS ABSOLUTE: 0.02 E9/L (ref 0–0.2)
BASOPHILS RELATIVE PERCENT: 0.3 % (ref 0–2)
BUN BLDV-MCNC: 26 MG/DL (ref 8–23)
CALCIUM SERPL-MCNC: 8.8 MG/DL (ref 8.6–10.2)
CHLORIDE BLD-SCNC: 105 MMOL/L (ref 98–107)
CO2: 23 MMOL/L (ref 22–29)
CREAT SERPL-MCNC: 1.1 MG/DL (ref 0.5–1)
EOSINOPHILS ABSOLUTE: 0.21 E9/L (ref 0.05–0.5)
EOSINOPHILS RELATIVE PERCENT: 3.4 % (ref 0–6)
GFR AFRICAN AMERICAN: 58
GFR NON-AFRICAN AMERICAN: 48 ML/MIN/1.73
GLUCOSE BLD-MCNC: 105 MG/DL (ref 74–99)
HCT VFR BLD CALC: 32.1 % (ref 34–48)
HEMOGLOBIN: 9.8 G/DL (ref 11.5–15.5)
IMMATURE GRANULOCYTES #: 0.08 E9/L
IMMATURE GRANULOCYTES %: 1.3 % (ref 0–5)
LYMPHOCYTES ABSOLUTE: 1.81 E9/L (ref 1.5–4)
LYMPHOCYTES RELATIVE PERCENT: 28.9 % (ref 20–42)
MAGNESIUM: 2.1 MG/DL (ref 1.6–2.6)
MCH RBC QN AUTO: 30.9 PG (ref 26–35)
MCHC RBC AUTO-ENTMCNC: 30.5 % (ref 32–34.5)
MCV RBC AUTO: 101.3 FL (ref 80–99.9)
MONOCYTES ABSOLUTE: 0.61 E9/L (ref 0.1–0.95)
MONOCYTES RELATIVE PERCENT: 9.7 % (ref 2–12)
NEUTROPHILS ABSOLUTE: 3.53 E9/L (ref 1.8–7.3)
NEUTROPHILS RELATIVE PERCENT: 56.4 % (ref 43–80)
PDW BLD-RTO: 13.9 FL (ref 11.5–15)
PLATELET # BLD: 269 E9/L (ref 130–450)
PMV BLD AUTO: 9.5 FL (ref 7–12)
POTASSIUM SERPL-SCNC: 4.9 MMOL/L (ref 3.5–5)
RBC # BLD: 3.17 E12/L (ref 3.5–5.5)
SODIUM BLD-SCNC: 140 MMOL/L (ref 132–146)
WBC # BLD: 6.3 E9/L (ref 4.5–11.5)

## 2019-12-09 PROCEDURE — 36415 COLL VENOUS BLD VENIPUNCTURE: CPT

## 2019-12-09 PROCEDURE — 2580000003 HC RX 258: Performed by: SPECIALIST

## 2019-12-09 PROCEDURE — 6370000000 HC RX 637 (ALT 250 FOR IP): Performed by: INTERNAL MEDICINE

## 2019-12-09 PROCEDURE — 83735 ASSAY OF MAGNESIUM: CPT

## 2019-12-09 PROCEDURE — 2580000003 HC RX 258: Performed by: INTERNAL MEDICINE

## 2019-12-09 PROCEDURE — 80048 BASIC METABOLIC PNL TOTAL CA: CPT

## 2019-12-09 PROCEDURE — 6360000002 HC RX W HCPCS: Performed by: INTERNAL MEDICINE

## 2019-12-09 PROCEDURE — 6360000002 HC RX W HCPCS: Performed by: SPECIALIST

## 2019-12-09 PROCEDURE — 1200000000 HC SEMI PRIVATE

## 2019-12-09 PROCEDURE — 85025 COMPLETE CBC W/AUTO DIFF WBC: CPT

## 2019-12-09 RX ADMIN — PHENYTOIN SODIUM 400 MG: 100 CAPSULE, EXTENDED RELEASE ORAL at 21:16

## 2019-12-09 RX ADMIN — LEVOTHYROXINE SODIUM 88 MCG: 100 TABLET ORAL at 10:10

## 2019-12-09 RX ADMIN — HYDROMORPHONE HYDROCHLORIDE 1 MG: 1 INJECTION, SOLUTION INTRAMUSCULAR; INTRAVENOUS; SUBCUTANEOUS at 01:11

## 2019-12-09 RX ADMIN — LISINOPRIL 10 MG: 10 TABLET ORAL at 10:11

## 2019-12-09 RX ADMIN — APIXABAN 5 MG: 5 TABLET, FILM COATED ORAL at 21:18

## 2019-12-09 RX ADMIN — LAMOTRIGINE 100 MG: 100 TABLET ORAL at 21:16

## 2019-12-09 RX ADMIN — DULOXETINE HYDROCHLORIDE 60 MG: 60 CAPSULE, DELAYED RELEASE ORAL at 10:11

## 2019-12-09 RX ADMIN — OXYBUTYNIN CHLORIDE 2.5 MG: 5 TABLET ORAL at 10:16

## 2019-12-09 RX ADMIN — CARVEDILOL 6.25 MG: 6.25 TABLET, FILM COATED ORAL at 10:11

## 2019-12-09 RX ADMIN — DAPTOMYCIN 400 MG: 500 INJECTION, POWDER, LYOPHILIZED, FOR SOLUTION INTRAVENOUS at 17:07

## 2019-12-09 RX ADMIN — LEVOTHYROXINE SODIUM 100 MCG: 100 TABLET ORAL at 10:14

## 2019-12-09 RX ADMIN — ARIPIPRAZOLE 5 MG: 5 TABLET ORAL at 21:20

## 2019-12-09 RX ADMIN — Medication 10 ML: at 10:11

## 2019-12-09 RX ADMIN — HYDROMORPHONE HYDROCHLORIDE 1 MG: 1 INJECTION, SOLUTION INTRAMUSCULAR; INTRAVENOUS; SUBCUTANEOUS at 21:12

## 2019-12-09 RX ADMIN — MEROPENEM 1 G: 1 INJECTION, POWDER, FOR SOLUTION INTRAVENOUS at 03:30

## 2019-12-09 RX ADMIN — LAMOTRIGINE 100 MG: 100 TABLET ORAL at 10:10

## 2019-12-09 RX ADMIN — Medication 10 ML: at 21:21

## 2019-12-09 RX ADMIN — MEROPENEM 1 G: 1 INJECTION, POWDER, FOR SOLUTION INTRAVENOUS at 14:37

## 2019-12-09 RX ADMIN — OXYBUTYNIN CHLORIDE 2.5 MG: 5 TABLET ORAL at 21:17

## 2019-12-09 RX ADMIN — APIXABAN 5 MG: 5 TABLET, FILM COATED ORAL at 10:12

## 2019-12-09 RX ADMIN — MONTELUKAST SODIUM 10 MG: 10 TABLET ORAL at 21:17

## 2019-12-09 RX ADMIN — CARVEDILOL 6.25 MG: 6.25 TABLET, FILM COATED ORAL at 21:17

## 2019-12-09 ASSESSMENT — PAIN SCALES - GENERAL
PAINLEVEL_OUTOF10: 9
PAINLEVEL_OUTOF10: 0
PAINLEVEL_OUTOF10: 8
PAINLEVEL_OUTOF10: 2
PAINLEVEL_OUTOF10: 8
PAINLEVEL_OUTOF10: 4

## 2019-12-10 LAB
ANION GAP SERPL CALCULATED.3IONS-SCNC: 11 MMOL/L (ref 7–16)
BASOPHILS ABSOLUTE: 0.02 E9/L (ref 0–0.2)
BASOPHILS RELATIVE PERCENT: 0.3 % (ref 0–2)
BLOOD CULTURE, ROUTINE: NORMAL
BUN BLDV-MCNC: 20 MG/DL (ref 8–23)
CALCIUM SERPL-MCNC: 8.5 MG/DL (ref 8.6–10.2)
CHLORIDE BLD-SCNC: 108 MMOL/L (ref 98–107)
CO2: 22 MMOL/L (ref 22–29)
CREAT SERPL-MCNC: 1 MG/DL (ref 0.5–1)
CULTURE, BLOOD 2: NORMAL
EOSINOPHILS ABSOLUTE: 0.19 E9/L (ref 0.05–0.5)
EOSINOPHILS RELATIVE PERCENT: 3 % (ref 0–6)
GFR AFRICAN AMERICAN: >60
GFR NON-AFRICAN AMERICAN: 54 ML/MIN/1.73
GLUCOSE BLD-MCNC: 102 MG/DL (ref 74–99)
HCT VFR BLD CALC: 28.2 % (ref 34–48)
HEMOGLOBIN: 9 G/DL (ref 11.5–15.5)
IMMATURE GRANULOCYTES #: 0.12 E9/L
IMMATURE GRANULOCYTES %: 1.9 % (ref 0–5)
LYMPHOCYTES ABSOLUTE: 2.22 E9/L (ref 1.5–4)
LYMPHOCYTES RELATIVE PERCENT: 34.9 % (ref 20–42)
MAGNESIUM: 2 MG/DL (ref 1.6–2.6)
MCH RBC QN AUTO: 31.4 PG (ref 26–35)
MCHC RBC AUTO-ENTMCNC: 31.9 % (ref 32–34.5)
MCV RBC AUTO: 98.3 FL (ref 80–99.9)
MONOCYTES ABSOLUTE: 0.64 E9/L (ref 0.1–0.95)
MONOCYTES RELATIVE PERCENT: 10.1 % (ref 2–12)
NEUTROPHILS ABSOLUTE: 3.17 E9/L (ref 1.8–7.3)
NEUTROPHILS RELATIVE PERCENT: 49.8 % (ref 43–80)
PDW BLD-RTO: 13.7 FL (ref 11.5–15)
PLATELET # BLD: 236 E9/L (ref 130–450)
PMV BLD AUTO: 9.4 FL (ref 7–12)
POTASSIUM SERPL-SCNC: 4.9 MMOL/L (ref 3.5–5)
RBC # BLD: 2.87 E12/L (ref 3.5–5.5)
SODIUM BLD-SCNC: 141 MMOL/L (ref 132–146)
WBC # BLD: 6.4 E9/L (ref 4.5–11.5)

## 2019-12-10 PROCEDURE — 6360000002 HC RX W HCPCS: Performed by: INTERNAL MEDICINE

## 2019-12-10 PROCEDURE — 6370000000 HC RX 637 (ALT 250 FOR IP): Performed by: INTERNAL MEDICINE

## 2019-12-10 PROCEDURE — 80048 BASIC METABOLIC PNL TOTAL CA: CPT

## 2019-12-10 PROCEDURE — 6360000002 HC RX W HCPCS: Performed by: SPECIALIST

## 2019-12-10 PROCEDURE — 83735 ASSAY OF MAGNESIUM: CPT

## 2019-12-10 PROCEDURE — 36415 COLL VENOUS BLD VENIPUNCTURE: CPT

## 2019-12-10 PROCEDURE — 2580000003 HC RX 258: Performed by: SPECIALIST

## 2019-12-10 PROCEDURE — 2580000003 HC RX 258: Performed by: INTERNAL MEDICINE

## 2019-12-10 PROCEDURE — 6370000000 HC RX 637 (ALT 250 FOR IP): Performed by: SPECIALIST

## 2019-12-10 PROCEDURE — 85025 COMPLETE CBC W/AUTO DIFF WBC: CPT

## 2019-12-10 PROCEDURE — 1200000000 HC SEMI PRIVATE

## 2019-12-10 RX ORDER — GREEN TEA/HOODIA GORDONII 315-12.5MG
2 CAPSULE ORAL 3 TIMES DAILY
Qty: 60 TABLET | Refills: 5 | Status: SHIPPED | OUTPATIENT
Start: 2019-12-10 | End: 2020-01-09

## 2019-12-10 RX ORDER — LINEZOLID 600 MG/1
600 TABLET, FILM COATED ORAL EVERY 12 HOURS SCHEDULED
Qty: 20 TABLET | Refills: 0 | Status: SHIPPED | OUTPATIENT
Start: 2019-12-10 | End: 2019-12-20

## 2019-12-10 RX ORDER — LINEZOLID 600 MG/1
600 TABLET, FILM COATED ORAL EVERY 12 HOURS SCHEDULED
Status: DISCONTINUED | OUTPATIENT
Start: 2019-12-10 | End: 2019-12-11 | Stop reason: HOSPADM

## 2019-12-10 RX ORDER — LEVOFLOXACIN 500 MG/1
500 TABLET, FILM COATED ORAL DAILY
Status: DISCONTINUED | OUTPATIENT
Start: 2019-12-10 | End: 2019-12-11 | Stop reason: HOSPADM

## 2019-12-10 RX ORDER — LEVOFLOXACIN 500 MG/1
500 TABLET, FILM COATED ORAL DAILY
Qty: 10 TABLET | Refills: 0 | Status: SHIPPED | OUTPATIENT
Start: 2019-12-10 | End: 2019-12-20

## 2019-12-10 RX ADMIN — OXYBUTYNIN CHLORIDE 2.5 MG: 5 TABLET ORAL at 21:11

## 2019-12-10 RX ADMIN — Medication 10 ML: at 08:55

## 2019-12-10 RX ADMIN — Medication 10 ML: at 21:00

## 2019-12-10 RX ADMIN — APIXABAN 5 MG: 5 TABLET, FILM COATED ORAL at 21:00

## 2019-12-10 RX ADMIN — LEVOFLOXACIN 500 MG: 500 TABLET, FILM COATED ORAL at 21:01

## 2019-12-10 RX ADMIN — ARIPIPRAZOLE 5 MG: 5 TABLET ORAL at 21:03

## 2019-12-10 RX ADMIN — HYDROMORPHONE HYDROCHLORIDE 1 MG: 1 INJECTION, SOLUTION INTRAMUSCULAR; INTRAVENOUS; SUBCUTANEOUS at 11:19

## 2019-12-10 RX ADMIN — DULOXETINE HYDROCHLORIDE 60 MG: 60 CAPSULE, DELAYED RELEASE ORAL at 08:54

## 2019-12-10 RX ADMIN — MEROPENEM 1 G: 1 INJECTION, POWDER, FOR SOLUTION INTRAVENOUS at 07:01

## 2019-12-10 RX ADMIN — LINEZOLID 600 MG: 600 TABLET, FILM COATED ORAL at 21:01

## 2019-12-10 RX ADMIN — PHENYTOIN SODIUM 400 MG: 100 CAPSULE, EXTENDED RELEASE ORAL at 21:01

## 2019-12-10 RX ADMIN — LAMOTRIGINE 100 MG: 100 TABLET ORAL at 08:54

## 2019-12-10 RX ADMIN — LEVOTHYROXINE SODIUM 100 MCG: 100 TABLET ORAL at 06:51

## 2019-12-10 RX ADMIN — APIXABAN 5 MG: 5 TABLET, FILM COATED ORAL at 08:55

## 2019-12-10 RX ADMIN — LEVOTHYROXINE SODIUM 88 MCG: 100 TABLET ORAL at 06:51

## 2019-12-10 RX ADMIN — LAMOTRIGINE 100 MG: 100 TABLET ORAL at 21:01

## 2019-12-10 RX ADMIN — CARVEDILOL 6.25 MG: 6.25 TABLET, FILM COATED ORAL at 08:54

## 2019-12-10 RX ADMIN — OXYBUTYNIN CHLORIDE 2.5 MG: 5 TABLET ORAL at 08:54

## 2019-12-10 RX ADMIN — MONTELUKAST SODIUM 10 MG: 10 TABLET ORAL at 21:01

## 2019-12-10 RX ADMIN — LISINOPRIL 10 MG: 10 TABLET ORAL at 08:54

## 2019-12-10 ASSESSMENT — PAIN SCALES - GENERAL
PAINLEVEL_OUTOF10: 0
PAINLEVEL_OUTOF10: 6

## 2019-12-11 VITALS
OXYGEN SATURATION: 96 % | DIASTOLIC BLOOD PRESSURE: 80 MMHG | SYSTOLIC BLOOD PRESSURE: 137 MMHG | WEIGHT: 190 LBS | HEART RATE: 86 BPM | TEMPERATURE: 98 F | RESPIRATION RATE: 16 BRPM | BODY MASS INDEX: 35.87 KG/M2 | HEIGHT: 61 IN

## 2019-12-11 LAB
ANION GAP SERPL CALCULATED.3IONS-SCNC: 9 MMOL/L (ref 7–16)
BASOPHILS ABSOLUTE: 0.03 E9/L (ref 0–0.2)
BASOPHILS RELATIVE PERCENT: 0.4 % (ref 0–2)
BLOOD CULTURE, ROUTINE: NORMAL
BUN BLDV-MCNC: 22 MG/DL (ref 8–23)
CALCIUM SERPL-MCNC: 8.8 MG/DL (ref 8.6–10.2)
CHLORIDE BLD-SCNC: 106 MMOL/L (ref 98–107)
CO2: 24 MMOL/L (ref 22–29)
CREAT SERPL-MCNC: 1 MG/DL (ref 0.5–1)
CULTURE, BLOOD 2: NORMAL
EOSINOPHILS ABSOLUTE: 0.26 E9/L (ref 0.05–0.5)
EOSINOPHILS RELATIVE PERCENT: 3.9 % (ref 0–6)
GFR AFRICAN AMERICAN: >60
GFR NON-AFRICAN AMERICAN: 54 ML/MIN/1.73
GLUCOSE BLD-MCNC: 97 MG/DL (ref 74–99)
HCT VFR BLD CALC: 30 % (ref 34–48)
HEMOGLOBIN: 9.4 G/DL (ref 11.5–15.5)
IMMATURE GRANULOCYTES #: 0.18 E9/L
IMMATURE GRANULOCYTES %: 2.7 % (ref 0–5)
LYMPHOCYTES ABSOLUTE: 2.13 E9/L (ref 1.5–4)
LYMPHOCYTES RELATIVE PERCENT: 31.9 % (ref 20–42)
MAGNESIUM: 1.9 MG/DL (ref 1.6–2.6)
MCH RBC QN AUTO: 31.4 PG (ref 26–35)
MCHC RBC AUTO-ENTMCNC: 31.3 % (ref 32–34.5)
MCV RBC AUTO: 100.3 FL (ref 80–99.9)
MONOCYTES ABSOLUTE: 0.58 E9/L (ref 0.1–0.95)
MONOCYTES RELATIVE PERCENT: 8.7 % (ref 2–12)
NEUTROPHILS ABSOLUTE: 3.5 E9/L (ref 1.8–7.3)
NEUTROPHILS RELATIVE PERCENT: 52.4 % (ref 43–80)
PDW BLD-RTO: 13.7 FL (ref 11.5–15)
PLATELET # BLD: 277 E9/L (ref 130–450)
PMV BLD AUTO: 9.4 FL (ref 7–12)
POTASSIUM SERPL-SCNC: 5.3 MMOL/L (ref 3.5–5)
RBC # BLD: 2.99 E12/L (ref 3.5–5.5)
SODIUM BLD-SCNC: 139 MMOL/L (ref 132–146)
WBC # BLD: 6.7 E9/L (ref 4.5–11.5)

## 2019-12-11 PROCEDURE — 6370000000 HC RX 637 (ALT 250 FOR IP): Performed by: INTERNAL MEDICINE

## 2019-12-11 PROCEDURE — 83735 ASSAY OF MAGNESIUM: CPT

## 2019-12-11 PROCEDURE — 80048 BASIC METABOLIC PNL TOTAL CA: CPT

## 2019-12-11 PROCEDURE — 6370000000 HC RX 637 (ALT 250 FOR IP): Performed by: SPECIALIST

## 2019-12-11 PROCEDURE — 36415 COLL VENOUS BLD VENIPUNCTURE: CPT

## 2019-12-11 PROCEDURE — 85025 COMPLETE CBC W/AUTO DIFF WBC: CPT

## 2019-12-11 PROCEDURE — 2580000003 HC RX 258: Performed by: INTERNAL MEDICINE

## 2019-12-11 RX ADMIN — LEVOFLOXACIN 500 MG: 500 TABLET, FILM COATED ORAL at 09:38

## 2019-12-11 RX ADMIN — APIXABAN 5 MG: 5 TABLET, FILM COATED ORAL at 09:36

## 2019-12-11 RX ADMIN — Medication 10 ML: at 09:39

## 2019-12-11 RX ADMIN — LINEZOLID 600 MG: 600 TABLET, FILM COATED ORAL at 09:39

## 2019-12-11 RX ADMIN — LEVOTHYROXINE SODIUM 88 MCG: 100 TABLET ORAL at 06:26

## 2019-12-11 RX ADMIN — OXYBUTYNIN CHLORIDE 2.5 MG: 5 TABLET ORAL at 09:37

## 2019-12-11 RX ADMIN — LEVOTHYROXINE SODIUM 100 MCG: 100 TABLET ORAL at 06:26

## 2019-12-11 RX ADMIN — LAMOTRIGINE 100 MG: 100 TABLET ORAL at 09:36

## 2019-12-11 RX ADMIN — DULOXETINE HYDROCHLORIDE 60 MG: 60 CAPSULE, DELAYED RELEASE ORAL at 09:39

## 2019-12-11 RX ADMIN — LISINOPRIL 10 MG: 10 TABLET ORAL at 09:38

## 2019-12-11 RX ADMIN — CARVEDILOL 6.25 MG: 6.25 TABLET, FILM COATED ORAL at 09:37

## 2019-12-11 ASSESSMENT — PAIN SCALES - GENERAL: PAINLEVEL_OUTOF10: 0

## 2020-01-07 ENCOUNTER — HOSPITAL ENCOUNTER (OUTPATIENT)
Dept: ULTRASOUND IMAGING | Age: 75
Discharge: HOME | End: 2020-01-09
Payer: MEDICARE

## 2020-01-07 ENCOUNTER — HOSPITAL ENCOUNTER (OUTPATIENT)
Dept: INTERVENTIONAL RADIOLOGY/VASCULAR | Age: 75
Discharge: HOME | End: 2020-01-09
Payer: MEDICARE

## 2020-01-07 PROCEDURE — 93922 UPR/L XTREMITY ART 2 LEVELS: CPT

## 2020-01-07 PROCEDURE — 93970 EXTREMITY STUDY: CPT

## 2020-01-20 ENCOUNTER — HOSPITAL ENCOUNTER (OUTPATIENT)
Dept: WOUND CARE | Age: 75
Discharge: HOME | End: 2020-01-20
Payer: MEDICARE

## 2020-01-20 VITALS
RESPIRATION RATE: 18 BRPM | DIASTOLIC BLOOD PRESSURE: 76 MMHG | WEIGHT: 180.25 LBS | TEMPERATURE: 97.4 F | HEART RATE: 80 BPM | HEIGHT: 62 IN | BODY MASS INDEX: 33.17 KG/M2 | SYSTOLIC BLOOD PRESSURE: 162 MMHG

## 2020-01-20 PROBLEM — A41.9 SEPSIS (HCC): Status: RESOLVED | Noted: 2019-09-20 | Resolved: 2020-01-20

## 2020-01-20 PROBLEM — L03.116 CELLULITIS OF LEFT LEG: Status: RESOLVED | Noted: 2017-08-07 | Resolved: 2020-01-20

## 2020-01-20 PROBLEM — D72.829 LEUKOCYTOSIS: Status: RESOLVED | Noted: 2019-09-20 | Resolved: 2020-01-20

## 2020-01-20 PROBLEM — N17.9 ACUTE KIDNEY INJURY SUPERIMPOSED ON CHRONIC KIDNEY DISEASE (HCC): Status: RESOLVED | Noted: 2019-09-20 | Resolved: 2020-01-20

## 2020-01-20 PROBLEM — N39.0 URINARY TRACT INFECTION, SITE NOT SPECIFIED: Status: RESOLVED | Noted: 2019-09-25 | Resolved: 2020-01-20

## 2020-01-20 PROBLEM — L03.116 CELLULITIS AND ABSCESS OF LEFT LEG: Status: RESOLVED | Noted: 2019-12-05 | Resolved: 2020-01-20

## 2020-01-20 PROBLEM — L97.222 LOWER LIMB ULCER, CALF, LEFT, WITH FAT LAYER EXPOSED (HCC): Status: ACTIVE | Noted: 2020-01-20

## 2020-01-20 PROBLEM — L02.416 CELLULITIS AND ABSCESS OF LEFT LEG: Status: RESOLVED | Noted: 2019-12-05 | Resolved: 2020-01-20

## 2020-01-20 PROBLEM — N18.9 ACUTE KIDNEY INJURY SUPERIMPOSED ON CHRONIC KIDNEY DISEASE (HCC): Status: RESOLVED | Noted: 2019-09-20 | Resolved: 2020-01-20

## 2020-01-20 PROCEDURE — 11042 DBRDMT SUBQ TIS 1ST 20SQCM/<: CPT

## 2020-01-20 PROCEDURE — 99214 OFFICE O/P EST MOD 30 MIN: CPT | Performed by: SURGERY

## 2020-01-20 PROCEDURE — 11045 DBRDMT SUBQ TISS EACH ADDL: CPT

## 2020-01-20 PROCEDURE — 11045 DBRDMT SUBQ TISS EACH ADDL: CPT | Performed by: SURGERY

## 2020-01-20 PROCEDURE — 99214 OFFICE O/P EST MOD 30 MIN: CPT

## 2020-01-20 PROCEDURE — 11042 DBRDMT SUBQ TIS 1ST 20SQCM/<: CPT | Performed by: SURGERY

## 2020-01-20 RX ORDER — LIDOCAINE HYDROCHLORIDE 40 MG/ML
SOLUTION TOPICAL ONCE
Status: DISCONTINUED | OUTPATIENT
Start: 2020-01-20 | End: 2020-01-21 | Stop reason: HOSPADM

## 2020-01-20 RX ORDER — HYDRALAZINE HYDROCHLORIDE 50 MG/1
75 TABLET, FILM COATED ORAL 2 TIMES DAILY
Status: ON HOLD | COMMUNITY
End: 2022-09-24 | Stop reason: HOSPADM

## 2020-01-20 RX ORDER — TERBINAFINE HYDROCHLORIDE 250 MG/1
250 TABLET ORAL DAILY
Qty: 15 TABLET | Refills: 0 | Status: SHIPPED | OUTPATIENT
Start: 2020-01-20 | End: 2020-02-04

## 2020-01-20 NOTE — PROGRESS NOTES
needed for Pain      ipratropium-albuterol (DUONEB) 0.5-2.5 (3) MG/3ML SOLN nebulizer solution Inhale 1 vial into the lungs every 4 hours as needed for Shortness of Breath      Acetaminophen (ACETAMIN PO) Take 650 mg by mouth every 6 hours as needed (fever)      DULoxetine (CYMBALTA) 60 MG extended release capsule Take 60 mg by mouth daily       phenytoin (DILANTIN) 100 MG ER capsule Take 400 mg by mouth nightly       oxybutynin (DITROPAN) 5 MG tablet Take 2.5 mg by mouth 2 times daily      Calcium Carb-Cholecalciferol (OYSTER SHELL CALCIUM) 500-400 MG-UNIT TABS Take 1 tablet by mouth 2 times daily Ld 10/10/2018      metoclopramide (REGLAN) 5 MG tablet Take 5 mg by mouth every 6 hours as needed (nausea)       levothyroxine (SYNTHROID) 100 MCG tablet Take 100 mcg by mouth Daily For a total of 188 mcg      fluticasone (FLONASE) 50 MCG/ACT nasal spray 2 sprays by Each Nostril route daily as needed       famotidine (PEPCID) 20 MG tablet Take 20 mg by mouth daily       guaiFENesin-dextromethorphan (ROBITUSSIN DM) 100-10 MG/5ML syrup Take 5 mLs by mouth every 4 hours as needed for Cough       camphor-menthol (SARNA) 0.5-0.5 % lotion Apply topically as needed for Itching       ARIPiprazole (ABILIFY) 5 MG tablet Take 5 mg by mouth nightly       carvedilol (COREG) 6.25 MG tablet Take 6.25 mg by mouth 2 times daily       fentaNYL (DURAGESIC) 75 MCG/HR Place 1 patch onto the skin every 72 hours .  fluocinonide (LIDEX) 0.05 % cream Apply topically 2 times daily Indications: to ears       lamoTRIgine (LAMICTAL) 100 MG tablet Take 100 mg by mouth 2 times daily Instructed to take am of procedure      nystatin (NYSTATIN) 729510 UNIT/GM powder Apply topically as needed Apply topically 4 times daily.       senna (SENOKOT) 8.6 MG tablet Take 2 tablets by mouth nightly       montelukast (SINGULAIR) 10 MG tablet Take 10 mg by mouth nightly      levothyroxine (SYNTHROID) 88 MCG tablet Take 88 mcg by mouth Daily Thyroid goiter not appreciated    Carotid bruit is not noted bilaterally  LUNGS:  No increased work of breathing                  Clear to auscultation bilaterally   CARDIOVASCULAR:  regular rate and rhythm   ABDOMEN:  soft, non-distended, non-tender    Hernias is not noted   Aorta is not palpable   Lymphatics : Cervical lymphadenopathy is not noted     Femoral lymphadenopathy is not noted  SKIN:   Skin color is normal   Texture and turgor is  normal   Induration is not noted  EXTREMITIES:   R UE Edema is not noted  L UE Edema is not noted  R LE Edema is  noted  L LE Edema is  Noted      Patient has several ulcers, with a fat layer exposed over the shin of the left leg and also some superficial areas of skin breakdown with underlying dermatophytosis, swelling of the left due to lymphedema        R femoral 2 L femoral 2   R dorsalis pedis 1 L dorsalis pedis 1   R posterior tibial 1 L posterior tibial 1     Assessment:     Other pertinent information:    1. Ankle-brachial index done December 2019, personally reviewed by me normal with good arterial flow    2. The venous ultrasound study done December 2019, personally reviewed, no evidence of deep vein thrombosis reflux noticed at the left saphenofemoral junction and bilateral over the meets greater saphenous vein, testing however not satisfactory as patient is unable to follow instructions for reflux and Valsalva maneuver    3. Recent hospital records including history physical, infectious disease consultation note, discharge summary etc. were all carefully reviewed        Problem List Items Addressed This Visit     Lower limb ulcer, calf, left, with fat layer exposed ([address]        Procedure Note  Indications:  Based on my examination of this patient's wound(s)/ulcer(s) today, debridement is required to promote healing and evaluate the wound base.     Performed by: Leonardo Hicks MD    Consent obtained:  Yes    Time out taken:  Yes    Pain Control: Anesthetic  Anesthetic: 4% Lidocaine Liquid Topical     Debridement:Excisional Debridement    Using curette the wound(s)/ulcer(s) was/were sharply debrided down through and including the removal of subcutaneous tissue. Devitalized Tissue Debrided:  fibrin    Pre Debridement Measurements:  Are located in the [facility]  Documentation Flow Sheet    Wound/Ulcer #: 1    Post Debridement Measurements:  Wound/Ulcer Descriptions are Pre Debridement except measurements:    Wound 12/05/19 Pretibial Left (Active)   Number of days: 46       Wound 01/20/20 Pretibial Left wound #1 left pretibial acquired 12/5/19 (Active)   Wound Image   1/20/2020 10:30 AM   Wound Venous 1/20/2020 10:30 AM   Wound Length (cm) 14 cm 1/20/2020 10:30 AM   Wound Width (cm) 8 cm 1/20/2020 10:30 AM   Wound Depth (cm) 0.2 cm 1/20/2020 10:30 AM   Wound Surface Area (cm^2) 112 cm^2 1/20/2020 10:30 AM   Wound Volume (cm^3) 22.4 cm^3 1/20/2020 10:30 AM   Wound Assessment Pale;Pink;Red; White 1/20/2020 10:30 AM   Drainage Amount Large 1/20/2020 10:30 AM   Drainage Description Serous; Yellow 1/20/2020 10:30 AM   Odor None 1/20/2020 10:30 AM   Adelia-wound Assessment Fragile 1/20/2020 10:30 AM   Non-staged Wound Description Full thickness 1/20/2020 10:30 AM   Number of days: 0       Percent of Wound/Ulcer Debrided: 30%    Total Surface Area Debrided:  30 sq cm     Estimated Blood Loss:  Minimal    Hemostasis Achieved:  by pressure    Procedural Pain:  2  / 10     Post Procedural Pain:  3 / 10     Response to treatment:  Well tolerated by patient. A culture was not done.       Plan:     Discussed the patient, patient explained that have personally reviewed the ankle-brachial  Venous ultrasound studies, overall satisfactory no vascular intervention is planned at the present time    Patient recommended dressing changes with topical Aquacel Ag for the open areas, apply topical Lotrimin cream from the toes in between the toes, both feet up to the upper calf

## 2020-01-27 ENCOUNTER — HOSPITAL ENCOUNTER (OUTPATIENT)
Dept: WOUND CARE | Age: 75
Discharge: HOME | End: 2020-01-27
Payer: MEDICARE

## 2020-01-27 VITALS
HEIGHT: 62 IN | BODY MASS INDEX: 32.59 KG/M2 | WEIGHT: 177.13 LBS | HEART RATE: 68 BPM | DIASTOLIC BLOOD PRESSURE: 70 MMHG | RESPIRATION RATE: 16 BRPM | TEMPERATURE: 97.3 F | SYSTOLIC BLOOD PRESSURE: 124 MMHG

## 2020-01-27 PROCEDURE — 11042 DBRDMT SUBQ TIS 1ST 20SQCM/<: CPT

## 2020-01-27 PROCEDURE — 11042 DBRDMT SUBQ TIS 1ST 20SQCM/<: CPT | Performed by: SURGERY

## 2020-01-27 RX ORDER — LIDOCAINE HYDROCHLORIDE 40 MG/ML
SOLUTION TOPICAL ONCE
Status: DISCONTINUED | OUTPATIENT
Start: 2020-01-27 | End: 2020-01-28 | Stop reason: HOSPADM

## 2020-01-27 NOTE — PROGRESS NOTES
Wound Healing Center Followup Visit Note    Referring Physician : DO Rosemarie Kelly 66 RECORD NUMBER:  [id number]  AGE: 66 y.o. GENDER: female  : 1945  EPISODE DATE:  2020    Subjective:         Chief Complaint   Patient presents with    Wound Check       left  lower leg         HISTORY of PRESENT ILLNESS HPI      Lay Duran is a 66 y.o. female who presents today for wound/ulcer evaluation. History of Wound Context:  The patient has had a wound of left calf which was first noted approximately in 2019. This has been treated in the hospital, admitted with cellulitis, treated with antibiotics including meropenem and daptomycin. On their initial visit to the wound healing center, 20, the patient has noted that the wound has not been improving. The patient has had similar previous wounds in the past.       Pt is currently not on abx. Wound/Ulcer Pain Timing/Severity: constant  Quality of pain: dull, aching  Severity:  2 / 10   Modifying Factors: Pain worsens with walking  Associated Signs/Symptoms: edema and drainage     Ulcer Identification:  Ulcer Type: non-healing/non-surgical and lymphedema  Contributing Factors: edema and lymphedema     Diabetic/Pressure/Non Pressure Ulcers only:  Ulcer: N/A     If patient has diabetic lower extremity wounds  Linton Classification of diabetic lower extremity wounds:     Grade Description   []? 0 No open wound   []? 1 Superficial ulcer involving the full skin thickness   []? 2 Deep ulcer involves ligament, tendon, joint capsule, or fascia  No bone involvement or abscess presence   []? 3 Deep Ulcer with abcess formation and/or osteomyelitis   []? 4 Localized gangrene   []?   5 Extensive gangrene of the foot      Wound: Patient has several ulcers, with a fat layer exposed over the shin of the left leg and also some superficial areas of skin breakdown with underlying dermatophytosis, swelling of the left due to lymphedema     January 27, 2020: Wounds look much improved, consider Unna boot              PAST MEDICAL HISTORY      Diagnosis Date    Asthma     Autistic disorder, residual state     Cellulitis of left leg 8/7/2017    Chronic major depressive disorder, recurrent episode ([address]     Dermatophytosis 8/7/2017    Hx of blood clots     Hyperlipidemia     Hypertension     Hypothyroidism     Lower limb ulcer, calf, left, with fat layer exposed ([address] 1/20/2020    Lymphedema of both lower extremities 8/7/2017    Mental retardation     mild    Obesity     Osteoarthritis     Seizures ([address]     none recent as of 9/18     Past Surgical History:   Procedure Laterality Date    APPENDECTOMY  1969    RI CORNEAL TRANSPLANT,ENDOTHELIAL Left 9/14/2018    LEFT EYE CATARACT EXTRACTION IOL IMPLANT  ++LATEX ALLERGY++ performed by Gabby Schrader MD at [address] [place] [address] W/O ECP Right 10/12/2018    RIGHT EYE CATARACT EXTRACTION IOL IMPLANT  ++LATEX ALLERGY++ performed by Gabby Schrader MD at [place] [address]  age 8     History reviewed. No pertinent family history. Social History     Tobacco Use    Smoking status: Never Smoker    Smokeless tobacco: Never Used   Substance Use Topics    Alcohol use: No    Drug use: No     Allergies   Allergen Reactions    Latex     Cephalosporins     Clindamycin/Lincomycin     Duoderm Hydroactive [Hydroactive Dressings]     Pcn [Penicillins]     Tape [Adhesive Tape]      surgical     Current Outpatient Medications on File Prior to Encounter   Medication Sig Dispense Refill    apixaban (ELIQUIS) 5 MG TABS tablet Take by mouth 2 times daily      camphor-menthol (SARNA) 0.5-0.5 % lotion Apply topically as needed for Itching Apply topically as needed.       hydrALAZINE (APRESOLINE) 25 MG tablet Take 25 mg by mouth 2 times daily      terbinafine (LAMISIL) 250 MG tablet Take 1 tablet by mouth daily for 15 days 15 tablet 0    butenafine  nystatin (NYSTATIN) 463012 UNIT/GM powder Apply topically as needed Apply topically 4 times daily.  senna (SENOKOT) 8.6 MG tablet Take 2 tablets by mouth nightly       montelukast (SINGULAIR) 10 MG tablet Take 10 mg by mouth nightly      levothyroxine (SYNTHROID) 88 MCG tablet Take 88 mcg by mouth Daily For a total of 188 mcg      Multiple Vitamins-Minerals (THERAPEUTIC MULTIVITAMIN-MINERALS) tablet Take 1 tablet by mouth daily Ld 10/10/2018      triamcinolone (KENALOG) 0.1 % cream Apply topically nightly Apply to BLE topically nightly.  simvastatin (ZOCOR) 20 MG tablet Take 20 mg by mouth nightly      magnesium hydroxide (MILK OF MAGNESIA) 400 MG/5ML suspension Take 30 mLs by mouth daily as needed. No current facility-administered medications on file prior to encounter. REVIEW OF SYSTEMS See HPI    Objective:    /70   Pulse 68   Temp 97.3 °F (36.3 °C) (Temporal)   Resp 16   Ht 5' 1.5\" (1.562 m)   Wt 177 lb 2 oz (80.3 kg)   BMI 32.93 kg/m²   Wt Readings from Last 3 Encounters:   01/27/20 177 lb 2 oz (80.3 kg)   01/20/20 180 lb 4 oz (81.8 kg)   12/09/19 190 lb (86.2 kg)     PHYSICAL EXAM  CONSTITUTIONAL:   Awake, alert, cooperative   EYES:  lids and lashes normal  ENT: external ears and nose without lesions  NECK:  supple, symmetrical, trachea midline  SKIN:  Open wound Present    Assessment:     Problem List Items Addressed This Visit     None        Procedure Note  Indications:  Based on my examination of this patient's wound(s)/ulcer(s) today, debridement is required to promote healing and evaluate the wound base. Performed by: Lani Gaxiola MD    Consent obtained:  Yes    Time out taken:  Yes    Pain Control: Anesthetic  Anesthetic: 4% Lidocaine Liquid Topical     Debridement:Excisional Debridement    Using curette the wound(s)/ulcer(s) was/were sharply debrided down through and including the removal of subcutaneous tissue.         Devitalized Tissue Debrided:  fibrin to stimulate bleeding to promote healing, post debridement good bleeding base and wound edges noted    Pre Debridement Measurements:  Are located in the [facility]  Documentation Flow Sheet    Wound/Ulcer #: 1    Post Debridement Measurements:  Wound/Ulcer Descriptions are Pre Debridement except measurements:    Wound 12/05/19 Pretibial Left (Active)   Number of days: 53       Wound 01/20/20 Pretibial Left wound #1 left pretibial acquired 12/5/19 (Active)   Wound Image   1/20/2020 10:30 AM   Wound Venous 1/20/2020 10:30 AM   Wound Length (cm) 8.6 cm 1/27/2020 10:37 AM   Wound Width (cm) 5.8 cm 1/27/2020 10:37 AM   Wound Depth (cm) 0.1 cm 1/27/2020 10:37 AM   Wound Surface Area (cm^2) 49.88 cm^2 1/27/2020 10:37 AM   Change in Wound Size % (l*w) 55.46 1/27/2020 10:37 AM   Wound Volume (cm^3) 4.99 cm^3 1/27/2020 10:37 AM   Wound Healing % 78 1/27/2020 10:37 AM   Post-Procedure Length (cm) 8.8 cm 1/27/2020 10:54 AM   Post-Procedure Width (cm) 5.9 cm 1/27/2020 10:54 AM   Post-Procedure Depth (cm) 0.1 cm 1/27/2020 10:54 AM   Post-Procedure Surface Area (cm^2) 51.92 cm^2 1/27/2020 10:54 AM   Post-Procedure Volume (cm^3) 5.19 cm^3 1/27/2020 10:54 AM   Wound Assessment Black; Red 1/27/2020 10:37 AM   Drainage Amount Small 1/27/2020 10:37 AM   Drainage Description Serous; Serosanguinous 1/27/2020 10:37 AM   Odor None 1/27/2020 10:37 AM   Adelia-wound Assessment Dry;Pink 1/27/2020 10:37 AM   Non-staged Wound Description Full thickness 1/20/2020 10:30 AM   Number of days: 7     Percent of Wound/Ulcer Debrided: 50%    Total Surface Area Debrided:  20 sq cm     Estimated Blood Loss:  None  Hemostasis Achieved:  by pressure    Procedural Pain:  2  / 10   Post Procedural Pain:  3 / 10     Response to treatment:  Well tolerated by patient.      Plan:     Discussed the patient, the ankle-brachial index satisfactory arterial flow, venous ultrasound no evidence of deep vein thrombosis, follow conservatively with the compression bandage with Unna boot and once ulcer is healed, consider lymphedema therapy versus compression stockings    Treatment Note please see attached Discharge Instructions    Written patient dismissal instructions given to patient and signed by patient or POA. Discharge Instructions       Visit Discharge/Physician Orders     Discharge condition: Stable     Assessment of pain at discharge:  NONE     Anesthetic used: 4% LIDOCAINE LIQUID     Discharge to: ECF     Left via:Private automobile     Accompanied by: accompanied by CAREGIVER      [facility]        Dressing Orders: LT PRETIB: CLEANSE WOUND WITH NORMAL STERILE SALINE, APPLY AQUACEL AG TO WOUND,  COVER WITH DRY GAUZE AND SECURE. CHANGE TWICE A DAY. APPLY LOTRIMIN CREAM TOPICALLY TO RED AREAS TWICE A DAY      APPLY SPANDAGRIP'S IN THE MORNING AND REMOVE AT BEDTIME. ELEVATE LEGS ABOVE THE LEVEL OF THE HEART 3-4 TIMES A DAY.     Treatment Orders: LAMISIL PO TIMES 15 DAYS      [address] [place] [address],[address] followup visit ________One week with Dr. Siddiqi_____________________  (Please note your next appointment above and if you are unable to keep, kindly give a 24 hour notice.  Thank you.)     Physician signature:__________________________        If you experience any of the following, please call the [address] [place] [address] during business hours:     * Increase in Pain  * Temperature over 101  * Increase in drainage from your wound  * Drainage with a foul odor  * Bleeding  * Increase in swelling  * Need for compression bandage changes due to slippage, breakthrough drainage.     If you need medical attention outside of the business hours of the [facility] please contact your PCP or go to the nearest emergency room.                 Electronically signed by Moustapha Royal MD on 1/27/2020 at 10:56 AM

## 2020-02-03 ENCOUNTER — HOSPITAL ENCOUNTER (OUTPATIENT)
Dept: WOUND CARE | Age: 75
Discharge: HOME | End: 2020-02-03
Payer: MEDICARE

## 2020-02-03 VITALS
BODY MASS INDEX: 32.57 KG/M2 | HEART RATE: 84 BPM | RESPIRATION RATE: 16 BRPM | DIASTOLIC BLOOD PRESSURE: 70 MMHG | TEMPERATURE: 98.8 F | SYSTOLIC BLOOD PRESSURE: 140 MMHG | HEIGHT: 62 IN | WEIGHT: 177 LBS

## 2020-02-03 PROBLEM — L97.222 LOWER LIMB ULCER, CALF, LEFT, WITH FAT LAYER EXPOSED (HCC): Status: RESOLVED | Noted: 2020-01-20 | Resolved: 2020-02-03

## 2020-02-03 PROCEDURE — 99213 OFFICE O/P EST LOW 20 MIN: CPT

## 2020-02-03 PROCEDURE — 99213 OFFICE O/P EST LOW 20 MIN: CPT | Performed by: SURGERY

## 2020-02-03 RX ORDER — LIDOCAINE HYDROCHLORIDE 40 MG/ML
SOLUTION TOPICAL ONCE
Status: DISCONTINUED | OUTPATIENT
Start: 2020-02-03 | End: 2020-02-03

## 2020-02-03 RX ORDER — TERBINAFINE HYDROCHLORIDE 250 MG/1
250 TABLET ORAL DAILY
Qty: 15 TABLET | Refills: 0 | Status: SHIPPED | OUTPATIENT
Start: 2020-02-03 | End: 2020-02-18

## 2020-02-03 NOTE — PROGRESS NOTES
Wound Healing Center Followup Visit Note    Referring Physician : DO Rosemarie Kelly 66 RECORD NUMBER:  [id number]  AGE: 66 y.o. GENDER: female  : 1945  EPISODE DATE:  2/3/2020    Subjective:       Chief Complaint   Patient presents with    Wound Check       left  lower leg         HISTORY of PRESENT ILLNESS HPI      Sylvia Soares is a 66 y. o. female who presents today for wound/ulcer evaluation. History of Wound Context:  The patient has had a wound of left calf which was first noted approximately in 2019.  This has been treated in the hospital, admitted with cellulitis, treated with antibiotics including meropenem and daptomycin.  On their initial visit to the wound healing center, 20, the patient has noted that the wound has not been improving.  The patient has had similar previous wounds in the past.       Pt is currently not on abx.     Wound/Ulcer Pain Timing/Severity: constant  Quality of pain: dull, aching  Severity:  2 / 10   Modifying Factors: Pain worsens with walking  Associated Signs/Symptoms: edema and drainage     Ulcer Identification:  Ulcer Type: non-healing/non-surgical and lymphedema  Contributing Factors: edema and lymphedema     Diabetic/Pressure/Non Pressure Ulcers only:  Ulcer: N/A     If patient has diabetic lower extremity wounds  Linton Classification of diabetic lower extremity wounds:     Grade Description   []? ?  0 No open wound   []? ?  1 Superficial ulcer involving the full skin thickness   []? ?  2 Deep ulcer involves ligament, tendon, joint capsule, or fascia  No bone involvement or abscess presence   []? ?  3 Deep Ulcer with abcess formation and/or osteomyelitis   []? ?  4 Localized gangrene   []? ?  5 Extensive gangrene of the foot      Wound: Patient has several ulcers, with a fat layer exposed over the shin of the left leg and also some superficial areas of skin breakdown with underlying dermatophytosis, swelling of the left due to lymphedema     January 27, 2020: Wounds look much improved, consider Unna boot     February 3, 2020: Patient doing much better, denies any fever chills, no tenderness, wounds have healed, recommended her to consider lymphedema therapy, topical antifungal cream for 1 month and short-term oral Lamisil therapy and to call PRN if any recurrence of problems                   PAST MEDICAL HISTORY      Diagnosis Date    Asthma     Autistic disorder, residual state     Cellulitis of left leg 8/7/2017    Chronic major depressive disorder, recurrent episode ([address]     Dermatophytosis 8/7/2017    Hx of blood clots     Hyperlipidemia     Hypertension     Hypothyroidism     Lower limb ulcer, calf, left, with fat layer exposed ([address] 1/20/2020    Lymphedema of both lower extremities 8/7/2017    Mental retardation     mild    Obesity     Osteoarthritis     Seizures ([address]     none recent as of 9/18     Past Surgical History:   Procedure Laterality Date    APPENDECTOMY  1969    DC CORNEAL TRANSPLANT,ENDOTHELIAL Left 9/14/2018    LEFT EYE CATARACT EXTRACTION IOL IMPLANT  ++LATEX ALLERGY++ performed by Renny Chambers MD at [address] [place] [address] RMVL INSJ IO LENS PROSTH W/O ECP Right 10/12/2018    RIGHT EYE CATARACT EXTRACTION IOL IMPLANT  ++LATEX ALLERGY++ performed by Renny Chambers MD at [place] [address]  age 8     History reviewed. No pertinent family history.   Social History     Tobacco Use    Smoking status: Never Smoker    Smokeless tobacco: Never Used   Substance Use Topics    Alcohol use: No    Drug use: No     Allergies   Allergen Reactions    Latex     Cephalosporins     Clindamycin/Lincomycin     Duoderm Hydroactive [Hydroactive Dressings]     Pcn [Penicillins]     Tape [Adhesive Tape]      surgical     Current Outpatient Medications on File Prior to Encounter   Medication Sig Dispense Refill    apixaban (ELIQUIS) 5 MG TABS tablet Take by mouth 2 times daily      hydrALAZINE hours as needed for Pain      ipratropium-albuterol (DUONEB) 0.5-2.5 (3) MG/3ML SOLN nebulizer solution Inhale 1 vial into the lungs every 4 hours as needed for Shortness of Breath      Acetaminophen (ACETAMIN PO) Take 650 mg by mouth every 6 hours as needed (fever)      Calcium Carb-Cholecalciferol (OYSTER SHELL CALCIUM) 500-400 MG-UNIT TABS Take 1 tablet by mouth 2 times daily Ld 10/10/2018      metoclopramide (REGLAN) 5 MG tablet Take 5 mg by mouth every 6 hours as needed (nausea)       fluticasone (FLONASE) 50 MCG/ACT nasal spray 2 sprays by Each Nostril route daily as needed       guaiFENesin-dextromethorphan (ROBITUSSIN DM) 100-10 MG/5ML syrup Take 5 mLs by mouth every 4 hours as needed for Cough       camphor-menthol (SARNA) 0.5-0.5 % lotion Apply topically as needed for Itching       nystatin (NYSTATIN) 027205 UNIT/GM powder Apply topically as needed Apply topically 4 times daily.  magnesium hydroxide (MILK OF MAGNESIA) 400 MG/5ML suspension Take 30 mLs by mouth daily as needed. No current facility-administered medications on file prior to encounter.         REVIEW OF SYSTEMS See HPI    Objective:    BP (!) 140/70   Pulse 84   Temp 98.8 °F (37.1 °C) (Temporal)   Resp 16   Ht 5' 1.5\" (1.562 m)   Wt 177 lb (80.3 kg)   BMI 32.90 kg/m²   Wt Readings from Last 3 Encounters:   02/03/20 177 lb (80.3 kg)   01/27/20 177 lb 2 oz (80.3 kg)   01/20/20 180 lb 4 oz (81.8 kg)     PHYSICAL EXAM  CONSTITUTIONAL:   Awake, alert, cooperative   EYES:  lids and lashes normal  ENT: external ears and nose without lesions  NECK:  supple, symmetrical, trachea midline  SKIN:  Open wound Present    Assessment:     Problem List Items Addressed This Visit     Lymphedema of both lower extremities - Primary    Relevant Orders    PT lymphedema evaluation and treatment    Leg swelling    Relevant Orders    PT lymphedema evaluation and treatment    History of DVT (deep vein thrombosis)    Relevant Orders    PT outside of the business hours of the [address] [place] [address] Road please contact your PCP or go to the nearest emergency room.              Electronically signed by Mag Cisneros MD on 2/3/2020 at 12:02 PM

## 2020-07-12 ENCOUNTER — APPOINTMENT (OUTPATIENT)
Dept: CT IMAGING | Age: 75
End: 2020-07-12
Payer: MEDICARE

## 2020-07-12 ENCOUNTER — HOSPITAL ENCOUNTER (EMERGENCY)
Age: 75
Discharge: HOME | End: 2020-07-12
Attending: EMERGENCY MEDICINE
Payer: MEDICARE

## 2020-07-12 VITALS
HEART RATE: 68 BPM | TEMPERATURE: 97.2 F | RESPIRATION RATE: 20 BRPM | BODY MASS INDEX: 32.57 KG/M2 | HEIGHT: 62 IN | OXYGEN SATURATION: 100 % | WEIGHT: 177 LBS

## 2020-07-12 LAB
ALBUMIN SERPL-MCNC: 4 G/DL (ref 3.5–5.2)
ALP BLD-CCNC: 78 U/L (ref 35–104)
ALT SERPL-CCNC: 26 U/L (ref 0–32)
ANION GAP SERPL CALCULATED.3IONS-SCNC: 13 MMOL/L (ref 7–16)
AST SERPL-CCNC: 47 U/L (ref 0–31)
BASOPHILS ABSOLUTE: 0.02 E9/L (ref 0–0.2)
BASOPHILS RELATIVE PERCENT: 0.3 % (ref 0–2)
BILIRUB SERPL-MCNC: <0.2 MG/DL (ref 0–1.2)
BUN BLDV-MCNC: 43 MG/DL (ref 8–23)
CALCIUM SERPL-MCNC: 8.8 MG/DL (ref 8.6–10.2)
CHLORIDE BLD-SCNC: 101 MMOL/L (ref 98–107)
CO2: 23 MMOL/L (ref 22–29)
CREAT SERPL-MCNC: 1.2 MG/DL (ref 0.5–1)
EOSINOPHILS ABSOLUTE: 0.26 E9/L (ref 0.05–0.5)
EOSINOPHILS RELATIVE PERCENT: 4.1 % (ref 0–6)
GFR AFRICAN AMERICAN: 53
GFR NON-AFRICAN AMERICAN: 44 ML/MIN/1.73
GLUCOSE BLD-MCNC: 128 MG/DL (ref 74–99)
HCT VFR BLD CALC: 35.3 % (ref 34–48)
HEMOGLOBIN: 11.4 G/DL (ref 11.5–15.5)
IMMATURE GRANULOCYTES #: 0.02 E9/L
IMMATURE GRANULOCYTES %: 0.3 % (ref 0–5)
LYMPHOCYTES ABSOLUTE: 2.48 E9/L (ref 1.5–4)
LYMPHOCYTES RELATIVE PERCENT: 39 % (ref 20–42)
MCH RBC QN AUTO: 31.6 PG (ref 26–35)
MCHC RBC AUTO-ENTMCNC: 32.3 % (ref 32–34.5)
MCV RBC AUTO: 97.8 FL (ref 80–99.9)
MONOCYTES ABSOLUTE: 0.82 E9/L (ref 0.1–0.95)
MONOCYTES RELATIVE PERCENT: 12.9 % (ref 2–12)
NEUTROPHILS ABSOLUTE: 2.76 E9/L (ref 1.8–7.3)
NEUTROPHILS RELATIVE PERCENT: 43.4 % (ref 43–80)
PDW BLD-RTO: 13.3 FL (ref 11.5–15)
PLATELET # BLD: 239 E9/L (ref 130–450)
PMV BLD AUTO: 9.4 FL (ref 7–12)
POTASSIUM SERPL-SCNC: 5.6 MMOL/L (ref 3.5–5)
RBC # BLD: 3.61 E12/L (ref 3.5–5.5)
SODIUM BLD-SCNC: 137 MMOL/L (ref 132–146)
TOTAL CK: 122 U/L (ref 20–180)
TOTAL PROTEIN: 7.8 G/DL (ref 6.4–8.3)
TROPONIN: <0.01 NG/ML (ref 0–0.03)
WBC # BLD: 6.4 E9/L (ref 4.5–11.5)

## 2020-07-12 PROCEDURE — 6360000002 HC RX W HCPCS: Performed by: EMERGENCY MEDICINE

## 2020-07-12 PROCEDURE — 84484 ASSAY OF TROPONIN QUANT: CPT

## 2020-07-12 PROCEDURE — 99284 EMERGENCY DEPT VISIT MOD MDM: CPT

## 2020-07-12 PROCEDURE — 82550 ASSAY OF CK (CPK): CPT

## 2020-07-12 PROCEDURE — 90471 IMMUNIZATION ADMIN: CPT | Performed by: EMERGENCY MEDICINE

## 2020-07-12 PROCEDURE — 96374 THER/PROPH/DIAG INJ IV PUSH: CPT

## 2020-07-12 PROCEDURE — 80053 COMPREHEN METABOLIC PANEL: CPT

## 2020-07-12 PROCEDURE — 90715 TDAP VACCINE 7 YRS/> IM: CPT | Performed by: EMERGENCY MEDICINE

## 2020-07-12 PROCEDURE — 85025 COMPLETE CBC W/AUTO DIFF WBC: CPT

## 2020-07-12 PROCEDURE — 72125 CT NECK SPINE W/O DYE: CPT

## 2020-07-12 PROCEDURE — 70450 CT HEAD/BRAIN W/O DYE: CPT

## 2020-07-12 RX ORDER — LORAZEPAM 2 MG/ML
2 INJECTION INTRAMUSCULAR ONCE
Status: COMPLETED | OUTPATIENT
Start: 2020-07-12 | End: 2020-07-12

## 2020-07-12 RX ORDER — LORAZEPAM 2 MG/ML
INJECTION INTRAMUSCULAR
Status: DISCONTINUED
Start: 2020-07-12 | End: 2020-07-12 | Stop reason: HOSPADM

## 2020-07-12 RX ADMIN — TETANUS TOXOID, REDUCED DIPHTHERIA TOXOID AND ACELLULAR PERTUSSIS VACCINE, ADSORBED 0.5 ML: 5; 2.5; 8; 8; 2.5 SUSPENSION INTRAMUSCULAR at 01:29

## 2020-07-12 RX ADMIN — LORAZEPAM 2 MG: 2 INJECTION INTRAMUSCULAR; INTRAVENOUS at 00:16

## 2020-07-12 NOTE — ED NOTES
ELLY agrees for transport of pt back to [facility].  ETA 30 minutes       Adela Nolasco, [address] [facility]  07/12/20 [id number]

## 2020-07-12 NOTE — ED PROVIDER NOTES
49-year-old female presenting from a nursing home for concern about seizure-like activity. Reportedly she has a seizure history, she is at her normal mental baseline at this point according to paramedics. She is yelling out, no seizure-like activity now, history of MR/DD. Full-time resident at the institution. Unknown to seizure-like activity was witnessed, concerned because she was on the ground with bleeding from the top of her head. Bleeding controlled upon arrival to emergency department. Patient is moving arms and legs but does not answer questions otherwise. No seizure-like activity at this time. History reviewed. No pertinent family history. Past Surgical History:   Procedure Laterality Date    APPENDECTOMY  1969    [place] CORNEAL TRANSPLANT,ENDOTHELIAL Left 9/14/2018    LEFT EYE CATARACT EXTRACTION IOL IMPLANT  ++LATEX ALLERGY++ performed by Amanda Patel MD at [address] [place] [address] IO LENS PROSTH W/O ECP Right 10/12/2018    RIGHT EYE CATARACT EXTRACTION IOL IMPLANT  ++LATEX ALLERGY++ performed by Amanda Patel MD at [facility]  age 4       Review of Systems   Unable to perform ROS: Mental status change        Physical Exam  Constitutional:       General: She is not in acute distress. Appearance: She is well-developed. Comments: Patient yelling out, insist on laying on her right side, dried blood to the hair on the top of her head   HENT:      Head: Normocephalic. Comments: Abrasion at the top of her head on the right side, bleeding controlled upon arrival, dried blood in her hair around it, slight swelling under the abrasion with no obvious ecchymosis  Eyes:      Conjunctiva/sclera: Conjunctivae normal.      Comments: Left pupil misshapen   Neck:      Musculoskeletal: Normal range of motion. Thyroid: No thyromegaly. Cardiovascular:      Rate and Rhythm: Normal rate and regular rhythm.    Pulmonary:      Effort: Pulmonary effort is normal. No respiratory distress. Breath sounds: Normal breath sounds. Abdominal:      General: There is no distension. Palpations: Abdomen is soft. Tenderness: There is no abdominal tenderness. There is no guarding or rebound. Musculoskeletal: Normal range of motion. General: No tenderness. Skin:     General: Skin is warm and dry. Findings: No erythema. Neurological:      Mental Status: She is alert. Mental status is at baseline. Cranial Nerves: No cranial nerve deficit. Procedures     MDM              --------------------------------------------- PAST HISTORY ---------------------------------------------  Past Medical History:  has a past medical history of Asthma, Autistic disorder, residual state, Cellulitis of left leg, Chronic major depressive disorder, recurrent episode ([address] Dermatophytosis, Hx of blood clots, Hyperlipidemia, Hypertension, Hypothyroidism, Lower limb ulcer, calf, left, with fat layer exposed ([address] Lymphedema of both lower extremities, Mental retardation, Obesity, Osteoarthritis, and Seizures ([address] Past Surgical History:  has a past surgical history that includes Tonsillectomy (age 8); Appendectomy (1969); pr corneal transplant,endothelial (Left, 9/14/2018); and pr xcapsl ctrc rmvl insj io lens prosth w/o ecp (Right, 10/12/2018). Social History:  reports that she has never smoked. She has never used smokeless tobacco. She reports that she does not drink alcohol or use drugs. Family History: family history is not on file. The patients home medications have been reviewed.     Allergies: Latex; Cephalosporins; Clindamycin/lincomycin; Duoderm hydroactive [hydroactive dressings]; Pcn [penicillins]; and Tape [adhesive tape]    -------------------------------------------------- RESULTS -------------------------------------------------  Labs:  Results for orders placed or performed during the hospital encounter of 07/12/20   CBC auto differential   Result Value Ref Range    WBC 6.4 4.5 - 11.5 E9/L    RBC 3.61 3.50 - 5.50 E12/L    Hemoglobin 11.4 (L) 11.5 - 15.5 g/dL    Hematocrit 35.3 34.0 - 48.0 %    MCV 97.8 80.0 - 99.9 fL    MCH 31.6 26.0 - 35.0 pg    MCHC 32.3 32.0 - 34.5 %    RDW 13.3 11.5 - 15.0 fL    Platelets 859 439 - 920 E9/L    MPV 9.4 7.0 - 12.0 fL    Neutrophils % 43.4 43.0 - 80.0 %    Immature Granulocytes % 0.3 0.0 - 5.0 %    Lymphocytes % 39.0 20.0 - 42.0 %    Monocytes % 12.9 (H) 2.0 - 12.0 %    Eosinophils % 4.1 0.0 - 6.0 %    Basophils % 0.3 0.0 - 2.0 %    Neutrophils Absolute 2.76 1.80 - 7.30 E9/L    Immature Granulocytes # 0.02 E9/L    Lymphocytes Absolute 2.48 1.50 - 4.00 E9/L    Monocytes Absolute 0.82 0.10 - 0.95 E9/L    Eosinophils Absolute 0.26 0.05 - 0.50 E9/L    Basophils Absolute 0.02 0.00 - 0.20 E9/L   Comprehensive metabolic panel   Result Value Ref Range    Sodium 137 132 - 146 mmol/L    Potassium 5.6 (H) 3.5 - 5.0 mmol/L    Chloride 101 98 - 107 mmol/L    CO2 23 22 - 29 mmol/L    Anion Gap 13 7 - 16 mmol/L    Glucose 128 (H) 74 - 99 mg/dL    BUN 43 (H) 8 - 23 mg/dL    CREATININE 1.2 (H) 0.5 - 1.0 mg/dL    GFR Non-African American 44 >=60 mL/min/1.73    GFR African American 53     Calcium 8.8 8.6 - 10.2 mg/dL    Total Protein 7.8 6.4 - 8.3 g/dL    Alb 4.0 3.5 - 5.2 g/dL    Total Bilirubin <0.2 0.0 - 1.2 mg/dL    Alkaline Phosphatase 78 35 - 104 U/L    ALT 26 0 - 32 U/L    AST 47 (H) 0 - 31 U/L   CK   Result Value Ref Range    Total  20 - 180 U/L   Troponin   Result Value Ref Range    Troponin <0.01 0.00 - 0.03 ng/mL       Radiology:  CT Head WO Contrast   Final Result      NO ACUTE INTRACRANIAL PROCESS      Left frontal extracranial soft tissue swelling         CT Cervical Spine WO Contrast   Final Result      NO ACUTE FRACTURE OR SIGNIFICANT SUBLUXATION IS IDENTIFIED      Osteoarthritic changes      Hypolordosis cervical spine suggesting a muscle spasm.               ------------------------- NURSING NOTES AND VITALS REVIEWED ---------------------------  Date / Time Roomed:  7/12/2020 12:01 AM  ED Bed Assignment:  20/20    The nursing notes within the ED encounter and vital signs as below have been reviewed. Pulse 68   Temp 97.2 °F (36.2 °C)   Resp 20   Ht 5' 1.5\" (1.562 m)   Wt 177 lb (80.3 kg)   SpO2 100%   BMI 32.90 kg/m²   Oxygen Saturation Interpretation: Normal      ------------------------------------------ PROGRESS NOTES ------------------------------------------  Patient interactive, talking with the nursing staff, asking to go to the bathroom which was addressed for her here. She will be given paperwork to go back to the facility with with instructions regarding return to the ED for any problems or any worsening. She should continue to take the medications for seizure activity as prescribed by her doctor in the nursing facility.    --------------------------------- ADDITIONAL PROVIDER NOTES ---------------------------------  At this time the patient is without objective evidence of an acute process requiring hospitalization or inpatient management. They have remained hemodynamically stable throughout their entire ED visit and are stable for discharge with outpatient follow-up. The plan has been discussed in detail and they are aware of the specific conditions for emergent return, as well as the importance of follow-up. New Prescriptions    No medications on file       Diagnosis:  1. Seizure-like activity ([facility] [address]    2. Fall, initial encounter        Disposition:  Patient's disposition: Discharge to home  Patient's condition is stable.              Lurlean Senegal, DO  07/12/20 4313

## 2020-12-22 ENCOUNTER — HOSPITAL ENCOUNTER (EMERGENCY)
Age: 75
Discharge: NURSING, REHAB OR LONG-TERM CARE | End: 2020-12-22
Attending: EMERGENCY MEDICINE
Payer: MEDICARE

## 2020-12-22 ENCOUNTER — APPOINTMENT (OUTPATIENT)
Dept: GENERAL RADIOLOGY | Age: 75
End: 2020-12-22
Payer: MEDICARE

## 2020-12-22 ENCOUNTER — APPOINTMENT (OUTPATIENT)
Dept: ULTRASOUND IMAGING | Age: 75
End: 2020-12-22
Payer: MEDICARE

## 2020-12-22 VITALS
DIASTOLIC BLOOD PRESSURE: 81 MMHG | HEIGHT: 65 IN | OXYGEN SATURATION: 95 % | RESPIRATION RATE: 16 BRPM | WEIGHT: 177 LBS | HEART RATE: 75 BPM | TEMPERATURE: 97.4 F | BODY MASS INDEX: 29.49 KG/M2 | SYSTOLIC BLOOD PRESSURE: 144 MMHG

## 2020-12-22 LAB
ALBUMIN SERPL-MCNC: 3.8 G/DL (ref 3.5–5.2)
ALP BLD-CCNC: 99 U/L (ref 35–104)
ALT SERPL-CCNC: 24 U/L (ref 0–32)
AMORPHOUS: ABNORMAL
ANION GAP SERPL CALCULATED.3IONS-SCNC: 11 MMOL/L (ref 7–16)
AST SERPL-CCNC: 33 U/L (ref 0–31)
BACTERIA: ABNORMAL /HPF
BASOPHILS ABSOLUTE: 0.04 E9/L (ref 0–0.2)
BASOPHILS RELATIVE PERCENT: 0.3 % (ref 0–2)
BILIRUB SERPL-MCNC: 0.2 MG/DL (ref 0–1.2)
BILIRUBIN URINE: NEGATIVE
BLOOD, URINE: ABNORMAL
BUN BLDV-MCNC: 21 MG/DL (ref 8–23)
CALCIUM SERPL-MCNC: 9 MG/DL (ref 8.6–10.2)
CHLORIDE BLD-SCNC: 101 MMOL/L (ref 98–107)
CLARITY: ABNORMAL
CO2: 25 MMOL/L (ref 22–29)
COLOR: YELLOW
CREAT SERPL-MCNC: 1.1 MG/DL (ref 0.5–1)
EOSINOPHILS ABSOLUTE: 0.13 E9/L (ref 0.05–0.5)
EOSINOPHILS RELATIVE PERCENT: 1 % (ref 0–6)
EPITHELIAL CELLS, UA: ABNORMAL /HPF
GFR AFRICAN AMERICAN: 58
GFR NON-AFRICAN AMERICAN: 48 ML/MIN/1.73
GLUCOSE BLD-MCNC: 119 MG/DL (ref 74–99)
GLUCOSE URINE: NEGATIVE MG/DL
HCT VFR BLD CALC: 34 % (ref 34–48)
HEMOGLOBIN: 11.5 G/DL (ref 11.5–15.5)
IMMATURE GRANULOCYTES #: 0.14 E9/L
IMMATURE GRANULOCYTES %: 1.1 % (ref 0–5)
KETONES, URINE: 15 MG/DL
LEUKOCYTE ESTERASE, URINE: ABNORMAL
LYMPHOCYTES ABSOLUTE: 2.17 E9/L (ref 1.5–4)
LYMPHOCYTES RELATIVE PERCENT: 17 % (ref 20–42)
MCH RBC QN AUTO: 31.9 PG (ref 26–35)
MCHC RBC AUTO-ENTMCNC: 33.8 % (ref 32–34.5)
MCV RBC AUTO: 94.2 FL (ref 80–99.9)
MONOCYTES ABSOLUTE: 0.74 E9/L (ref 0.1–0.95)
MONOCYTES RELATIVE PERCENT: 5.8 % (ref 2–12)
NEUTROPHILS ABSOLUTE: 9.52 E9/L (ref 1.8–7.3)
NEUTROPHILS RELATIVE PERCENT: 74.8 % (ref 43–80)
NITRITE, URINE: NEGATIVE
PDW BLD-RTO: 13 FL (ref 11.5–15)
PH UA: 8 (ref 5–9)
PLATELET # BLD: 375 E9/L (ref 130–450)
PMV BLD AUTO: 8.4 FL (ref 7–12)
POTASSIUM REFLEX MAGNESIUM: 4.1 MMOL/L (ref 3.5–5)
PROTEIN UA: ABNORMAL MG/DL
RBC # BLD: 3.61 E12/L (ref 3.5–5.5)
RBC UA: ABNORMAL /HPF (ref 0–2)
SODIUM BLD-SCNC: 137 MMOL/L (ref 132–146)
SPECIFIC GRAVITY UA: 1.01 (ref 1–1.03)
TOTAL PROTEIN: 8.3 G/DL (ref 6.4–8.3)
TROPONIN: 0.01 NG/ML (ref 0–0.03)
UROBILINOGEN, URINE: 1 E.U./DL
WBC # BLD: 12.7 E9/L (ref 4.5–11.5)
WBC UA: ABNORMAL /HPF (ref 0–5)

## 2020-12-22 PROCEDURE — 93970 EXTREMITY STUDY: CPT

## 2020-12-22 PROCEDURE — 6360000002 HC RX W HCPCS: Performed by: EMERGENCY MEDICINE

## 2020-12-22 PROCEDURE — 81001 URINALYSIS AUTO W/SCOPE: CPT

## 2020-12-22 PROCEDURE — 85025 COMPLETE CBC W/AUTO DIFF WBC: CPT

## 2020-12-22 PROCEDURE — 36415 COLL VENOUS BLD VENIPUNCTURE: CPT

## 2020-12-22 PROCEDURE — 84484 ASSAY OF TROPONIN QUANT: CPT

## 2020-12-22 PROCEDURE — 93970 EXTREMITY STUDY: CPT | Performed by: RADIOLOGY

## 2020-12-22 PROCEDURE — 80053 COMPREHEN METABOLIC PANEL: CPT

## 2020-12-22 PROCEDURE — 93005 ELECTROCARDIOGRAM TRACING: CPT | Performed by: STUDENT IN AN ORGANIZED HEALTH CARE EDUCATION/TRAINING PROGRAM

## 2020-12-22 PROCEDURE — 96372 THER/PROPH/DIAG INJ SC/IM: CPT

## 2020-12-22 PROCEDURE — 99284 EMERGENCY DEPT VISIT MOD MDM: CPT

## 2020-12-22 PROCEDURE — 71045 X-RAY EXAM CHEST 1 VIEW: CPT

## 2020-12-22 RX ORDER — FUROSEMIDE 40 MG/1
40 TABLET ORAL DAILY
COMMUNITY
End: 2022-09-21

## 2020-12-22 RX ORDER — DROPERIDOL 2.5 MG/ML
2.5 INJECTION, SOLUTION INTRAMUSCULAR; INTRAVENOUS ONCE
Status: COMPLETED | OUTPATIENT
Start: 2020-12-22 | End: 2020-12-22

## 2020-12-22 RX ORDER — GRANULES FOR ORAL 3 G/1
3 POWDER ORAL ONCE
Status: DISCONTINUED | OUTPATIENT
Start: 2020-12-22 | End: 2020-12-23 | Stop reason: HOSPADM

## 2020-12-22 RX ADMIN — DROPERIDOL 2.5 MG: 2.5 INJECTION, SOLUTION INTRAMUSCULAR; INTRAVENOUS at 14:47

## 2020-12-22 ASSESSMENT — PAIN DESCRIPTION - PAIN TYPE
TYPE: ACUTE PAIN
TYPE: ACUTE PAIN

## 2020-12-22 NOTE — ED PROVIDER NOTES
[facility]  Department of Emergency Medicine     Written by: Irma Khoury DO  Patient Name: Ceci Fields  Attending Provider: Shi Mayorga DO  Admit Date: 2020 12:33 PM  MRN: [id number]                   : 1945        Chief Complaint   Patient presents with    Altered Mental Status     patient is [facility] from nursing home, had seizure last night and has not been acting right since, hx autism    - Chief complaint    Patient is a 78-year-old female past medical history of, renal seizures, autism, and bilateral lymphedema. Patient presents from [facility] due to seizure-like activity and altered mental status. Per reports patient had a seizure last evening and has not been acting right since then. Current facility at baseline she is alert and oriented person and place however this morning she was found to be more agitated and uncooperative. Her Dilantin level was checked at the facility and was found to be within the normal limits, she has also recently been tested for Covid which was negative. On examination patient is slightly agitated but is cooperative with examination, she is alert to person but not place time or condition. Further history review of systems limited due to patient's mental status. Call placed to her brother who is power of , case discussed patient appears to be near her baseline in fact he states that she is usually somewhat agitated but is somewhat cooperative as well. He also notes that her lower extremities have had chronic infections from the past.  Patient's brother was appreciative of phone call. The history is provided by the patient. History is limited secondary to the patient's dementia. Review of Systems   Unable to perform ROS: Dementia        Physical Exam  Vitals signs and nursing note reviewed. Constitutional:       General: She is not in acute distress. Appearance: Normal appearance.    HENT:      Head: Normocephalic and atraumatic. Nose: No congestion or rhinorrhea. Mouth/Throat:      Mouth: Mucous membranes are moist.      Pharynx: Oropharynx is clear. Eyes:      Extraocular Movements: Extraocular movements intact. Pupils: Pupils are equal, round, and reactive to light. Neck:      Musculoskeletal: Normal range of motion. No neck rigidity or muscular tenderness. Cardiovascular:      Rate and Rhythm: Normal rate and regular rhythm. Heart sounds: No murmur. No gallop. Pulmonary:      Effort: Pulmonary effort is normal. No respiratory distress. Breath sounds: No wheezing, rhonchi or rales. Abdominal:      General: Abdomen is flat. Palpations: Abdomen is soft. There is no mass. Tenderness: There is no abdominal tenderness. There is no guarding. Hernia: No hernia is present. Musculoskeletal: Normal range of motion. General: No swelling, tenderness or signs of injury. Skin:     General: Skin is warm and dry. Capillary Refill: Capillary refill takes less than 2 seconds. Findings: Erythema and rash present. Comments: Patient with erythema and warmth to bilateral lower extremities, appears to be somewhat chronic in nature. Mild seepage from left shin no open or draining wounds. Neurological:      General: No focal deficit present. Mental Status: She is alert. Mental status is at baseline. She is disoriented. Psychiatric:         Mood and Affect: Mood normal.          Procedures       MDM  Number of Diagnoses or Management Options  Acute cystitis without hematuria  Transient alteration of awareness  Diagnosis management comments: Patient is a 64-year-old female past medical history of seizures, edema, no autism. Patient presents with altered mental status after suffering a seizure at facility yesterday. On examination patient is alert and oriented to person and place but not time or condition. Vital signs stable.   Physical exam patient with no new focal deficits, she is able to follow commands. Abdomen soft nontender, heart regular rate and rhythm. Call placed to brother who confirmed that this is patient's baseline as well that she is DNR CC. Laboratory work demonstrated an elevated white count 12.7, CMP demonstrated chronic liver creatinine 1.1, urinalysis did indicate slight urinary tract infection. Patient was given fosfomycin in emergency department. Decision made to discharge patient back to facility, all questions were answered patient is agreement plan of care was discharged back facility in stable condition. Amount and/or Complexity of Data Reviewed  Clinical lab tests: ordered and reviewed    Risk of Complications, Morbidity, and/or Mortality  Presenting problems: low  Diagnostic procedures: low  Management options: low    Patient Progress  Patient progress: stable     --------------------------------------------- PAST HISTORY ---------------------------------------------  Past Medical History:  has a past medical history of Asthma, Autistic disorder, residual state, Cellulitis of left leg, Chronic major depressive disorder, recurrent episode ([address] Dermatophytosis, Hx of blood clots, Hyperlipidemia, Hypertension, Hypothyroidism, Lower limb ulcer, calf, left, with fat layer exposed ([address] Lymphedema of both lower extremities, Mental retardation, Obesity, Osteoarthritis, and Seizures ([address] Past Surgical History:  has a past surgical history that includes Tonsillectomy (age 8); Appendectomy (1969); pr corneal transplant,endothelial (Left, 9/14/2018); and pr xcapsl ctrc rmvl insj io lens prosth w/o ecp (Right, 10/12/2018). Social History:  reports that she has never smoked. She has never used smokeless tobacco. She reports that she does not drink alcohol or use drugs. Family History: family history is not on file. The patients home medications have been reviewed.     Allergies: Latex, Cephalosporins, Clindamycin/lincomycin, Duoderm hydroactive [hydroactive dressings], Pcn [penicillins], and Tape [adhesive tape]    -------------------------------------------------- RESULTS -------------------------------------------------  Labs:  Results for orders placed or performed during the hospital encounter of 12/22/20   CBC Auto Differential   Result Value Ref Range    WBC 12.7 (H) 4.5 - 11.5 E9/L    RBC 3.61 3.50 - 5.50 E12/L    Hemoglobin 11.5 11.5 - 15.5 g/dL    Hematocrit 34.0 34.0 - 48.0 %    MCV 94.2 80.0 - 99.9 fL    MCH 31.9 26.0 - 35.0 pg    MCHC 33.8 32.0 - 34.5 %    RDW 13.0 11.5 - 15.0 fL    Platelets 482 891 - 765 E9/L    MPV 8.4 7.0 - 12.0 fL    Neutrophils % 74.8 43.0 - 80.0 %    Immature Granulocytes % 1.1 0.0 - 5.0 %    Lymphocytes % 17.0 (L) 20.0 - 42.0 %    Monocytes % 5.8 2.0 - 12.0 %    Eosinophils % 1.0 0.0 - 6.0 %    Basophils % 0.3 0.0 - 2.0 %    Neutrophils Absolute 9.52 (H) 1.80 - 7.30 E9/L    Immature Granulocytes # 0.14 E9/L    Lymphocytes Absolute 2.17 1.50 - 4.00 E9/L    Monocytes Absolute 0.74 0.10 - 0.95 E9/L    Eosinophils Absolute 0.13 0.05 - 0.50 E9/L    Basophils Absolute 0.04 0.00 - 0.20 E9/L   Comprehensive Metabolic Panel w/ Reflex to MG   Result Value Ref Range    Sodium 137 132 - 146 mmol/L    Potassium reflex Magnesium 4.1 3.5 - 5.0 mmol/L    Chloride 101 98 - 107 mmol/L    CO2 25 22 - 29 mmol/L    Anion Gap 11 7 - 16 mmol/L    Glucose 119 (H) 74 - 99 mg/dL    BUN 21 8 - 23 mg/dL    CREATININE 1.1 (H) 0.5 - 1.0 mg/dL    GFR Non-African American 48 >=60 mL/min/1.73    GFR African American 58     Calcium 9.0 8.6 - 10.2 mg/dL    Total Protein 8.3 6.4 - 8.3 g/dL    Alb 3.8 3.5 - 5.2 g/dL    Total Bilirubin 0.2 0.0 - 1.2 mg/dL    Alkaline Phosphatase 99 35 - 104 U/L    ALT 24 0 - 32 U/L    AST 33 (H) 0 - 31 U/L   Troponin   Result Value Ref Range    Troponin 0.01 0.00 - 0.03 ng/mL   Urinalysis, reflex to microscopic   Result Value Ref Range    Color, UA Yellow Straw/Yellow    Clarity, UA CLOUDY (A) Clear    Glucose, Ur Negative Negative mg/dL    Bilirubin Urine Negative Negative    Ketones, Urine 15 (A) Negative mg/dL    Specific Gravity, UA 1.015 1.005 - 1.030    Blood, Urine SMALL (A) Negative    pH, UA 8.0 5.0 - 9.0    Protein, UA TRACE Negative mg/dL    Urobilinogen, Urine 1.0 <2.0 E.U./dL    Nitrite, Urine Negative Negative    Leukocyte Esterase, Urine TRACE (A) Negative   Microscopic Urinalysis   Result Value Ref Range    WBC, UA 5-10 (A) 0 - 5 /HPF    RBC, UA 1-3 0 - 2 /HPF    Epithelial Cells, UA FEW /HPF    Bacteria, UA MANY (A) None Seen /HPF    Amorphous, UA FEW    EKG 12 Lead   Result Value Ref Range    Ventricular Rate 78 BPM    Atrial Rate 78 BPM    P-R Interval 162 ms    QRS Duration 74 ms    Q-T Interval 398 ms    QTc Calculation (Bazett) 453 ms    P Axis 70 degrees    R Axis 16 degrees    T Axis 40 degrees       Radiology:  US DUP LOWER EXTREMITIES BILATERAL VENOUS   Final Result   No evidence for deep venous thrombosis bilateral lower extremities. XR CHEST PORTABLE   Final Result   No acute process. ------------------------- NURSING NOTES AND VITALS REVIEWED ---------------------------  Date / Time Roomed:  12/22/2020 12:33 PM  ED Bed Assignment:  16/57    The nursing notes within the ED encounter and vital signs as below have been reviewed. BP (!) 179/89   Pulse 79   Temp 97.4 °F (36.3 °C) (Axillary)   Resp 20   Ht 5' 5\" (1.651 m)   Wt 177 lb (80.3 kg)   SpO2 99%   BMI 29.45 kg/m²   Oxygen Saturation Interpretation: Normal      ------------------------------------------ PROGRESS NOTES ------------------------------------------  6:14 PM EST  I have spoken with the patient and discussed todays results, in addition to providing specific details for the plan of care and counseling regarding the diagnosis and prognosis. Their questions are answered at this time and they are agreeable with the plan.  I discussed at length with them reasons for immediate return here for re evaluation. They will followup with their primary care physician by calling their office tomorrow. --------------------------------- ADDITIONAL PROVIDER NOTES ---------------------------------  At this time the patient is without objective evidence of an acute process requiring hospitalization or inpatient management. They have remained hemodynamically stable throughout their entire ED visit and are stable for discharge with outpatient follow-up. The plan has been discussed in detail and they are aware of the specific conditions for emergent return, as well as the importance of follow-up. New Prescriptions    No medications on file       Diagnosis:  1. Acute cystitis without hematuria    2. Transient alteration of awareness        Disposition:  Patient's disposition: Discharge to nursing home  Patient's condition is stable. Patient was seen and evaluated by myself and my attending Odalys Nicolas DO. Assessment and Plan discussed with attending provider, please see attestation for final plan of care. DO Cassius Lanier DO  Resident  12/22/20 1816    ATTENDING PROVIDER ATTESTATION:     Gregorio Calzada presented to the emergency department for evaluation of Altered Mental Status (patient is [facility] from nursing home, had seizure last night and has not been acting right since, hx autism)    I have reviewed and discussed the case, including pertinent history (medical, surgical, family and social) and exam findings with the Resident and the Nurse assigned to Gregorio Calzada. I have personally performed and/or participated in the history, exam, medical decision making, and procedures and agree with all pertinent clinical information. I have reviewed my findings and recommendations with Gregorio Calzada and members of family present at the time of disposition. I, Dr. Meir Hall am the primary physician of record for this patient. MDM: The patient is 66 y.o. female  with a past medical history of       Diagnosis Date    Asthma     Autistic disorder, residual state     Cellulitis of left leg 8/7/2017    Chronic major depressive disorder, recurrent episode ([address]     Dermatophytosis 8/7/2017    Hx of blood clots     Hyperlipidemia     Hypertension     Hypothyroidism     Lower limb ulcer, calf, left, with fat layer exposed ([address] 1/20/2020    Lymphedema of both lower extremities 8/7/2017    Mental retardation     mild    Obesity     Osteoarthritis     Seizures ([address]     none recent as of 9/18     presenting to the emergency department with a chief complaint of seizure and altered mental status. Differential diagnosis includes but not limited to, seizure, postictal state, electrolyte derangement, urinary tract infection. The patient did have labs and imaging which were reviewed. CBC unremarkable, CMP unremarkable, troponin 0.01, no STEMI changes on EKG, ultrasound of the bilateral lower extremities negative for any DVT, chest x-ray negative. Patient will be discharged with antibiotic as she is DNR CC. My findings/plan: The primary encounter diagnosis was Acute cystitis without hematuria. A diagnosis of Transient alteration of awareness was also pertinent to this visit.   Discharge Medication List as of 12/22/2020  6:10 PM        Sharita Hernandez, [address], DO  12/24/20 5623

## 2020-12-23 NOTE — ED NOTES
Call placed to [facility] regarding ETA. Dispatch states another 45 minutes until .       Annabelle Reyes RN  12/22/20 6723

## 2020-12-23 NOTE — ED NOTES
Pt resting comfortably. No acute distress noted. Respirations unlabored. Will continue to monitor.       Aj Choudhary RN  12/22/20 [id number]

## 2020-12-24 LAB
EKG ATRIAL RATE: 78 BPM
EKG P AXIS: 70 DEGREES
EKG P-R INTERVAL: 162 MS
EKG Q-T INTERVAL: 398 MS
EKG QRS DURATION: 74 MS
EKG QTC CALCULATION (BAZETT): 453 MS
EKG R AXIS: 16 DEGREES
EKG T AXIS: 40 DEGREES
EKG VENTRICULAR RATE: 78 BPM

## 2022-09-21 ENCOUNTER — APPOINTMENT (OUTPATIENT)
Dept: GENERAL RADIOLOGY | Age: 77
DRG: 872 | End: 2022-09-21
Payer: MEDICARE

## 2022-09-21 ENCOUNTER — HOSPITAL ENCOUNTER (INPATIENT)
Age: 77
LOS: 3 days | Discharge: NURSING, REHAB OR LONG-TERM CARE | DRG: 872 | End: 2022-09-24
Attending: EMERGENCY MEDICINE | Admitting: INTERNAL MEDICINE
Payer: MEDICARE

## 2022-09-21 ENCOUNTER — APPOINTMENT (OUTPATIENT)
Dept: ULTRASOUND IMAGING | Age: 77
DRG: 872 | End: 2022-09-21
Payer: MEDICARE

## 2022-09-21 DIAGNOSIS — A41.9 SEPSIS, DUE TO UNSPECIFIED ORGANISM, UNSPECIFIED WHETHER ACUTE ORGAN DYSFUNCTION PRESENT (HCC): ICD-10-CM

## 2022-09-21 DIAGNOSIS — L03.116 CELLULITIS OF LEFT LOWER EXTREMITY: Primary | ICD-10-CM

## 2022-09-21 LAB
ALBUMIN SERPL-MCNC: 3 G/DL (ref 3.5–5.2)
ALP BLD-CCNC: 110 U/L (ref 35–104)
ALT SERPL-CCNC: 16 U/L (ref 0–32)
ANION GAP SERPL CALCULATED.3IONS-SCNC: 13 MMOL/L (ref 7–16)
AST SERPL-CCNC: 40 U/L (ref 0–31)
BASOPHILS ABSOLUTE: 0 E9/L (ref 0–0.2)
BASOPHILS RELATIVE PERCENT: 0.2 % (ref 0–2)
BILIRUB SERPL-MCNC: 0.3 MG/DL (ref 0–1.2)
BUN BLDV-MCNC: 30 MG/DL (ref 6–23)
C-REACTIVE PROTEIN: 8 MG/DL (ref 0–0.4)
CALCIUM SERPL-MCNC: 8.5 MG/DL (ref 8.6–10.2)
CHLORIDE BLD-SCNC: 98 MMOL/L (ref 98–107)
CO2: 24 MMOL/L (ref 22–29)
CREAT SERPL-MCNC: 1.3 MG/DL (ref 0.5–1)
EKG ATRIAL RATE: 89 BPM
EKG P AXIS: 83 DEGREES
EKG P-R INTERVAL: 164 MS
EKG Q-T INTERVAL: 374 MS
EKG QRS DURATION: 84 MS
EKG QTC CALCULATION (BAZETT): 455 MS
EKG R AXIS: 11 DEGREES
EKG T AXIS: 11 DEGREES
EKG VENTRICULAR RATE: 89 BPM
EOSINOPHILS ABSOLUTE: 0.24 E9/L (ref 0.05–0.5)
EOSINOPHILS RELATIVE PERCENT: 0.9 % (ref 0–6)
GFR AFRICAN AMERICAN: 48
GFR NON-AFRICAN AMERICAN: 39 ML/MIN/1.73
GLUCOSE BLD-MCNC: 146 MG/DL (ref 74–99)
HCT VFR BLD CALC: 31.7 % (ref 34–48)
HEMOGLOBIN: 10.4 G/DL (ref 11.5–15.5)
LACTIC ACID, SEPSIS: 1.4 MMOL/L (ref 0.5–1.9)
LYMPHOCYTES ABSOLUTE: 0.82 E9/L (ref 1.5–4)
LYMPHOCYTES RELATIVE PERCENT: 2.6 % (ref 20–42)
MCH RBC QN AUTO: 31.9 PG (ref 26–35)
MCHC RBC AUTO-ENTMCNC: 32.8 % (ref 32–34.5)
MCV RBC AUTO: 97.2 FL (ref 80–99.9)
MONOCYTES ABSOLUTE: 1.09 E9/L (ref 0.1–0.95)
MONOCYTES RELATIVE PERCENT: 3.5 % (ref 2–12)
NEUTROPHILS ABSOLUTE: 25.3 E9/L (ref 1.8–7.3)
NEUTROPHILS RELATIVE PERCENT: 93 % (ref 43–80)
PDW BLD-RTO: 13.2 FL (ref 11.5–15)
PLATELET # BLD: 316 E9/L (ref 130–450)
PMV BLD AUTO: 9.4 FL (ref 7–12)
POTASSIUM REFLEX MAGNESIUM: 4.7 MMOL/L (ref 3.5–5)
PROCALCITONIN: 0.33 NG/ML (ref 0–0.08)
RBC # BLD: 3.26 E12/L (ref 3.5–5.5)
SEDIMENTATION RATE, ERYTHROCYTE: 106 MM/HR (ref 0–20)
SODIUM BLD-SCNC: 135 MMOL/L (ref 132–146)
TOTAL PROTEIN: 7.8 G/DL (ref 6.4–8.3)
WBC # BLD: 27.2 E9/L (ref 4.5–11.5)

## 2022-09-21 PROCEDURE — 83605 ASSAY OF LACTIC ACID: CPT

## 2022-09-21 PROCEDURE — 80053 COMPREHEN METABOLIC PANEL: CPT

## 2022-09-21 PROCEDURE — 36415 COLL VENOUS BLD VENIPUNCTURE: CPT

## 2022-09-21 PROCEDURE — 76705 ECHO EXAM OF ABDOMEN: CPT

## 2022-09-21 PROCEDURE — 71045 X-RAY EXAM CHEST 1 VIEW: CPT

## 2022-09-21 PROCEDURE — 6360000002 HC RX W HCPCS: Performed by: STUDENT IN AN ORGANIZED HEALTH CARE EDUCATION/TRAINING PROGRAM

## 2022-09-21 PROCEDURE — 85651 RBC SED RATE NONAUTOMATED: CPT

## 2022-09-21 PROCEDURE — 84145 PROCALCITONIN (PCT): CPT

## 2022-09-21 PROCEDURE — 85025 COMPLETE CBC W/AUTO DIFF WBC: CPT

## 2022-09-21 PROCEDURE — 99285 EMERGENCY DEPT VISIT HI MDM: CPT

## 2022-09-21 PROCEDURE — 87040 BLOOD CULTURE FOR BACTERIA: CPT

## 2022-09-21 PROCEDURE — 86140 C-REACTIVE PROTEIN: CPT

## 2022-09-21 PROCEDURE — 2580000003 HC RX 258: Performed by: STUDENT IN AN ORGANIZED HEALTH CARE EDUCATION/TRAINING PROGRAM

## 2022-09-21 PROCEDURE — 2060000000 HC ICU INTERMEDIATE R&B

## 2022-09-21 PROCEDURE — 93005 ELECTROCARDIOGRAM TRACING: CPT | Performed by: STUDENT IN AN ORGANIZED HEALTH CARE EDUCATION/TRAINING PROGRAM

## 2022-09-21 RX ORDER — ONDANSETRON 2 MG/ML
4 INJECTION INTRAMUSCULAR; INTRAVENOUS EVERY 6 HOURS PRN
Status: DISCONTINUED | OUTPATIENT
Start: 2022-09-21 | End: 2022-09-25 | Stop reason: HOSPADM

## 2022-09-21 RX ORDER — BUMETANIDE 1 MG/1
2 TABLET ORAL DAILY
COMMUNITY

## 2022-09-21 RX ORDER — ACETAMINOPHEN 325 MG/1
650 TABLET ORAL EVERY 6 HOURS PRN
Status: DISCONTINUED | OUTPATIENT
Start: 2022-09-21 | End: 2022-09-25 | Stop reason: HOSPADM

## 2022-09-21 RX ORDER — SODIUM CHLORIDE 9 MG/ML
INJECTION, SOLUTION INTRAVENOUS PRN
Status: DISCONTINUED | OUTPATIENT
Start: 2022-09-21 | End: 2022-09-25 | Stop reason: HOSPADM

## 2022-09-21 RX ORDER — FLUTICASONE PROPIONATE 50 MCG
1 SPRAY, SUSPENSION (ML) NASAL DAILY
Status: DISCONTINUED | OUTPATIENT
Start: 2022-09-22 | End: 2022-09-22

## 2022-09-21 RX ORDER — SODIUM CHLORIDE 9 MG/ML
INJECTION, SOLUTION INTRAVENOUS CONTINUOUS
Status: DISCONTINUED | OUTPATIENT
Start: 2022-09-21 | End: 2022-09-25 | Stop reason: HOSPADM

## 2022-09-21 RX ORDER — ONDANSETRON 4 MG/1
4 TABLET, ORALLY DISINTEGRATING ORAL EVERY 8 HOURS PRN
Status: DISCONTINUED | OUTPATIENT
Start: 2022-09-21 | End: 2022-09-25 | Stop reason: HOSPADM

## 2022-09-21 RX ORDER — KETOROLAC TROMETHAMINE 30 MG/ML
15 INJECTION, SOLUTION INTRAMUSCULAR; INTRAVENOUS ONCE
Status: COMPLETED | OUTPATIENT
Start: 2022-09-21 | End: 2022-09-21

## 2022-09-21 RX ORDER — SENNA PLUS 8.6 MG/1
2 TABLET ORAL NIGHTLY
Status: DISCONTINUED | OUTPATIENT
Start: 2022-09-21 | End: 2022-09-25 | Stop reason: HOSPADM

## 2022-09-21 RX ORDER — OXYCODONE HYDROCHLORIDE AND ACETAMINOPHEN 5; 325 MG/1; MG/1
1 TABLET ORAL EVERY 4 HOURS PRN
Status: DISCONTINUED | OUTPATIENT
Start: 2022-09-21 | End: 2022-09-25 | Stop reason: HOSPADM

## 2022-09-21 RX ORDER — IPRATROPIUM BROMIDE AND ALBUTEROL SULFATE 2.5; .5 MG/3ML; MG/3ML
1 SOLUTION RESPIRATORY (INHALATION) EVERY 4 HOURS PRN
Status: DISCONTINUED | OUTPATIENT
Start: 2022-09-21 | End: 2022-09-25 | Stop reason: HOSPADM

## 2022-09-21 RX ORDER — SODIUM CHLORIDE 0.9 % (FLUSH) 0.9 %
10 SYRINGE (ML) INJECTION EVERY 12 HOURS SCHEDULED
Status: DISCONTINUED | OUTPATIENT
Start: 2022-09-21 | End: 2022-09-25 | Stop reason: HOSPADM

## 2022-09-21 RX ORDER — LEVETIRACETAM 750 MG/1
750 TABLET ORAL 2 TIMES DAILY
COMMUNITY

## 2022-09-21 RX ORDER — SODIUM CHLORIDE 0.9 % (FLUSH) 0.9 %
10 SYRINGE (ML) INJECTION PRN
Status: DISCONTINUED | OUTPATIENT
Start: 2022-09-21 | End: 2022-09-25 | Stop reason: HOSPADM

## 2022-09-21 RX ORDER — OXYCODONE HYDROCHLORIDE AND ACETAMINOPHEN 5; 325 MG/1; MG/1
1 TABLET ORAL EVERY 4 HOURS PRN
Status: ON HOLD | COMMUNITY
End: 2022-09-24 | Stop reason: HOSPADM

## 2022-09-21 RX ORDER — FAMOTIDINE 20 MG/1
20 TABLET, FILM COATED ORAL DAILY
Status: DISCONTINUED | OUTPATIENT
Start: 2022-09-22 | End: 2022-09-25 | Stop reason: HOSPADM

## 2022-09-21 RX ORDER — ACETAMINOPHEN 650 MG/1
650 SUPPOSITORY RECTAL EVERY 6 HOURS PRN
Status: DISCONTINUED | OUTPATIENT
Start: 2022-09-21 | End: 2022-09-25 | Stop reason: HOSPADM

## 2022-09-21 RX ORDER — ARIPIPRAZOLE 5 MG/1
5 TABLET ORAL NIGHTLY
Status: DISCONTINUED | OUTPATIENT
Start: 2022-09-21 | End: 2022-09-25 | Stop reason: HOSPADM

## 2022-09-21 RX ORDER — ATORVASTATIN CALCIUM 10 MG/1
10 TABLET, FILM COATED ORAL DAILY
Status: DISCONTINUED | OUTPATIENT
Start: 2022-09-22 | End: 2022-09-25 | Stop reason: HOSPADM

## 2022-09-21 RX ORDER — HYDRALAZINE HYDROCHLORIDE 25 MG/1
25 TABLET, FILM COATED ORAL 2 TIMES DAILY
Status: DISCONTINUED | OUTPATIENT
Start: 2022-09-21 | End: 2022-09-25 | Stop reason: HOSPADM

## 2022-09-21 RX ORDER — CARVEDILOL 25 MG/1
25 TABLET ORAL 2 TIMES DAILY
Status: DISCONTINUED | OUTPATIENT
Start: 2022-09-21 | End: 2022-09-25 | Stop reason: HOSPADM

## 2022-09-21 RX ORDER — PHENYTOIN SODIUM 100 MG/1
200 CAPSULE, EXTENDED RELEASE ORAL NIGHTLY
COMMUNITY

## 2022-09-21 RX ORDER — LAMOTRIGINE 100 MG/1
100 TABLET ORAL 2 TIMES DAILY
Status: DISCONTINUED | OUTPATIENT
Start: 2022-09-21 | End: 2022-09-22

## 2022-09-21 RX ORDER — M-VIT,TX,IRON,MINS/CALC/FOLIC 27MG-0.4MG
1 TABLET ORAL DAILY
Status: DISCONTINUED | OUTPATIENT
Start: 2022-09-22 | End: 2022-09-25 | Stop reason: HOSPADM

## 2022-09-21 RX ORDER — METOCLOPRAMIDE 5 MG/1
5 TABLET ORAL EVERY 6 HOURS PRN
Status: DISCONTINUED | OUTPATIENT
Start: 2022-09-21 | End: 2022-09-22

## 2022-09-21 RX ORDER — CALCIUM CARBONATE/VITAMIN D3 500 MG-10
1 TABLET ORAL 2 TIMES DAILY
COMMUNITY

## 2022-09-21 RX ORDER — MONTELUKAST SODIUM 10 MG/1
10 TABLET ORAL NIGHTLY
Status: DISCONTINUED | OUTPATIENT
Start: 2022-09-21 | End: 2022-09-25 | Stop reason: HOSPADM

## 2022-09-21 RX ORDER — AMMONIUM LACTATE 12 G/100G
CREAM TOPICAL PRN
COMMUNITY

## 2022-09-21 RX ORDER — DIPHENHYDRAMINE HCL 25 MG
25 CAPSULE ORAL EVERY 6 HOURS PRN
Status: ON HOLD | COMMUNITY
End: 2022-09-24 | Stop reason: HOSPADM

## 2022-09-21 RX ORDER — FUROSEMIDE 40 MG/1
40 TABLET ORAL DAILY
Status: DISCONTINUED | OUTPATIENT
Start: 2022-09-22 | End: 2022-09-22

## 2022-09-21 RX ORDER — PHENYTOIN SODIUM 100 MG/1
400 CAPSULE, EXTENDED RELEASE ORAL NIGHTLY
Status: DISCONTINUED | OUTPATIENT
Start: 2022-09-21 | End: 2022-09-25 | Stop reason: HOSPADM

## 2022-09-21 RX ORDER — OXYCODONE HYDROCHLORIDE AND ACETAMINOPHEN 5; 325 MG/1; MG/1
1 TABLET ORAL EVERY 12 HOURS
Status: ON HOLD | COMMUNITY
End: 2022-09-24 | Stop reason: HOSPADM

## 2022-09-21 RX ORDER — PHENYTOIN SODIUM 100 MG/1
100 CAPSULE, EXTENDED RELEASE ORAL DAILY
COMMUNITY

## 2022-09-21 RX ORDER — SODIUM CHLORIDE, SODIUM LACTATE, POTASSIUM CHLORIDE, AND CALCIUM CHLORIDE .6; .31; .03; .02 G/100ML; G/100ML; G/100ML; G/100ML
1000 INJECTION, SOLUTION INTRAVENOUS ONCE
Status: COMPLETED | OUTPATIENT
Start: 2022-09-21 | End: 2022-09-21

## 2022-09-21 RX ORDER — DULOXETIN HYDROCHLORIDE 60 MG/1
60 CAPSULE, DELAYED RELEASE ORAL DAILY
Status: DISCONTINUED | OUTPATIENT
Start: 2022-09-22 | End: 2022-09-25 | Stop reason: HOSPADM

## 2022-09-21 RX ORDER — ALBUTEROL SULFATE 2.5 MG/3ML
2.5 SOLUTION RESPIRATORY (INHALATION) EVERY 4 HOURS PRN
COMMUNITY

## 2022-09-21 RX ADMIN — VANCOMYCIN HYDROCHLORIDE 1500 MG: 10 INJECTION, POWDER, LYOPHILIZED, FOR SOLUTION INTRAVENOUS at 16:12

## 2022-09-21 RX ADMIN — MEROPENEM 1000 MG: 1 INJECTION, POWDER, FOR SOLUTION INTRAVENOUS at 14:27

## 2022-09-21 RX ADMIN — KETOROLAC TROMETHAMINE 15 MG: 30 INJECTION, SOLUTION INTRAMUSCULAR at 14:27

## 2022-09-21 RX ADMIN — SODIUM CHLORIDE, POTASSIUM CHLORIDE, SODIUM LACTATE AND CALCIUM CHLORIDE 1000 ML: 600; 310; 30; 20 INJECTION, SOLUTION INTRAVENOUS at 10:11

## 2022-09-21 ASSESSMENT — ENCOUNTER SYMPTOMS
BACK PAIN: 1
WHEEZING: 0
ALLERGIC/IMMUNOLOGIC NEGATIVE: 1
SORE THROAT: 0
SHORTNESS OF BREATH: 0
CONSTIPATION: 0
SINUS PRESSURE: 0
VOMITING: 0
EYES NEGATIVE: 1
ABDOMINAL PAIN: 1
NAUSEA: 0
CHEST TIGHTNESS: 0
ABDOMINAL DISTENTION: 0
COUGH: 0
DIARRHEA: 0

## 2022-09-21 ASSESSMENT — PAIN DESCRIPTION - LOCATION
LOCATION: HEAD
LOCATION: HEAD

## 2022-09-21 ASSESSMENT — PAIN DESCRIPTION - FREQUENCY: FREQUENCY: CONTINUOUS

## 2022-09-21 ASSESSMENT — PAIN DESCRIPTION - PAIN TYPE: TYPE: ACUTE PAIN

## 2022-09-21 ASSESSMENT — PAIN SCALES - GENERAL
PAINLEVEL_OUTOF10: 5
PAINLEVEL_OUTOF10: 4

## 2022-09-21 ASSESSMENT — PAIN DESCRIPTION - DESCRIPTORS
DESCRIPTORS: ACHING
DESCRIPTORS: ACHING

## 2022-09-21 ASSESSMENT — PAIN - FUNCTIONAL ASSESSMENT: PAIN_FUNCTIONAL_ASSESSMENT: 0-10

## 2022-09-21 NOTE — ED NOTES
Patient returned from CT and x-ray, with tech's reporting that patient is being uncooperative and were unable to complete assignments. Provider notified.       Jayro Neville RN  09/21/22 [id number]

## 2022-09-21 NOTE — ED NOTES
Pt resting in position of comfort on cot presenting in no acute/ apparent distress (NAD). Respirations are noted even and unlabored with good rise and fall of the chest observed. Pt updated with current plan of care (POC) and all questions/ concerns addressed. Patient voices no needs at this time. Cot noted in lowest position, locked, with side rails X 2 up for patient safety. Will continue to monitor patient for acute changes.       [x] Side rails up    [x] Cart in lowest position    [] Family at bedside    [x] Call light within reach         Jayro Neville RN  09/21/22 [id number]

## 2022-09-21 NOTE — ED PROVIDER NOTES
Patient is a 73-year-old female with past medical history of HTN, HLD, DVT (on Eliquis) seizures, autistic disorder, anemia, lymphedema of b/l legs, [place] on right ear coming from [facility] ( full time resident) to ED with complaints of pain in bilateral legs with associated fever. As per patient she has been feeling sick since last 2 to 3 days. She is yelling and shouting for help, complains of excruciating pain in her left hip which according to her is at baseline. She reports that she lies on her left side and cannot lie flat on back. Patient has been getting help from wound care nurse at [facility] for the chronic wounds in bilateral legs. Patient denies any cough, difficulty breathing, palpitations, chest pain, abdominal pain, nausea, vomiting, bowel or bladder complaints. Patient denies any chills, sick contacts. On arrival to ER patient was found to be febrile, temp 102.7, RR 99, SPO2 95% on room air, she received 650 mg of Tylenol as per EMS. Repeat temperature 100.6 F. Sepsis work-up was sent. She was found to have elevated WBC count of 27.2, Pro-Randall 0.33, creatinine 1.3. Urinalysis showed WBC 5-10, LES trace, negative for UTI. Patient received 1 L of LR bolus. She also received 1 dose of meropenem and vancomycin in ER for cover for cellulitis. Review of Systems   Constitutional:  Positive for chills and fever. HENT:  Positive for hearing loss. Negative for congestion, ear discharge, sinus pressure and sore throat. Eyes: Negative. Respiratory:  Negative for cough, chest tightness, shortness of breath and wheezing. Cardiovascular:  Positive for leg swelling. Negative for chest pain and palpitations. Gastrointestinal:  Positive for abdominal pain. Negative for abdominal distention, constipation, diarrhea, nausea and vomiting. Endocrine: Negative. Genitourinary:  Negative for dysuria, flank pain, frequency and urgency.    Musculoskeletal:  Positive for back pain. Negative for myalgias. Skin:  Positive for wound. Negative for pallor and rash. Allergic/Immunologic: Negative. Neurological:  Negative for tremors, seizures, speech difficulty, numbness and headaches. Hematological: Negative. Psychiatric/Behavioral: Negative. Physical Exam  Constitutional:       Appearance: She is obese. She is not ill-appearing. HENT:      Head: Normocephalic and atraumatic. Nose: Nose normal. No congestion. Mouth/Throat:      Mouth: Mucous membranes are dry. Pharynx: No oropharyngeal exudate. Eyes:      General: No scleral icterus. Pupils: Pupils are equal, round, and reactive to light. Cardiovascular:      Rate and Rhythm: Tachycardia present. Pulses: Normal pulses. Heart sounds: Normal heart sounds. No murmur heard. Pulmonary:      Effort: Pulmonary effort is normal. No respiratory distress. Breath sounds: Normal breath sounds. No wheezing. Abdominal:      Palpations: Abdomen is soft. Tenderness: There is abdominal tenderness. Comments: Abdomen soft, no rigidity or guarding, tenderness noted on deep palpation specifically in lower abdomen   Musculoskeletal:         General: Tenderness present. Cervical back: Normal range of motion and neck supple. No rigidity. Right lower leg: Edema present. Left lower leg: Edema present. Skin:     General: Skin is warm. Capillary Refill: Capillary refill takes less than 2 seconds. Findings: Lesion present. No bruising. Comments: Multiple superficial lesions on bilateral lower legs with associated erythema noted, L>R, no pus or bleeding noted from the wounds, extremities warm to touch. Neurological:      General: No focal deficit present. Mental Status: She is alert and oriented to person, place, and time. Cranial Nerves: Cranial nerve deficit present. Psychiatric:         Behavior: Behavior normal.         Thought Content:  Thought content Range    Procalcitonin 0.33 (H) 0.00 - 0.08 ng/mL   EKG 12 Lead   Result Value Ref Range    Ventricular Rate 89 BPM    Atrial Rate 89 BPM    P-R Interval 164 ms    QRS Duration 84 ms    Q-T Interval 374 ms    QTc Calculation (Bazett) 455 ms    P Axis 83 degrees    R Axis 11 degrees    T Axis 11 degrees       RADIOLOGY:  US ABDOMEN LIMITED   Final Result   Unremarkable right upper quadrant ultrasound. XR CHEST PORTABLE   Final Result   No acute process. XR TIBIA FIBULA RIGHT (2 VIEWS)    (Results Pending)   XR TIBIA FIBULA LEFT (2 VIEWS)    (Results Pending)   XR HIP LEFT (2-3 VIEWS)    (Results Pending)   XR HIP RIGHT (2-3 VIEWS)    (Results Pending)       EKG: This EKG is signed and interpreted by me. Rate: 89  Rhythm: Sinus  Interpretation: no acute changes      ------------------------- NURSING NOTES AND VITALS REVIEWED ---------------------------  Date / Time Roomed:  9/21/2022  9:34 AM  ED Bed Assignment:  06/06    The nursing notes within the ED encounter and vital signs as below have been reviewed. Patient Vitals for the past 24 hrs:   BP Temp Pulse Resp SpO2 Weight   09/21/22 0942 (!) 165/134 (!) 100.6 °F (38.1 °C) 99 18 95 % --   09/21/22 0931 100/73 (!) 102.7 °F (39.3 °C) (!) 102 18 94 % 177 lb (80.3 kg)       Oxygen Saturation Interpretation: Normal    ------------------------------------------ PROGRESS NOTES ------------------------------------------  Re-evaluation(s):  Time: 1:30 pm  Patients symptoms show no change  Repeat physical examination is not changed    Counseling:  I have spoken with the patient and discussed todays results, in addition to providing specific details for the plan of care and counseling regarding the diagnosis and prognosis.   Their questions are answered at this time and they are agreeable with the plan of admission.    --------------------------------- ADDITIONAL PROVIDER NOTES ---------------------------------  Consultations:  Time: 2 pm. Spoke with Dr. Tai Coleman. Discussed case. They will admit the patient. This patient's ED course included: a personal history and physicial examination, re-evaluation prior to disposition, multiple bedside re-evaluations, IV medications, and continuous pulse oximetry    This patient has remained hemodynamically stable during their ED course. Diagnosis:  1. Cellulitis of left lower extremity    2. Sepsis, due to unspecified organism, unspecified whether acute organ dysfunction present [facility])        Disposition:  Patient's disposition: Admit to med/surg floor  Patient's condition is fair.        Kristin Carey MD  Resident  09/21/22 7022

## 2022-09-21 NOTE — LETTER
[address] Medicaid  CERTIFICATION OF NECESSITY  FOR TRANSPORTATION   BY WHEELCHAIR VAN     Individual Information   1. Name: Vivienne Tam 2. [place] Medicaid Billing Number:    3. Address: [facility]  [address]      Transportation Provider Information   4. Provider Name:    5. [place] Medicaid Provider Number:  National Provider Identifier (NPI):      Certification  7. Criteria:  By signing this document, the practitioner certifies that two statements are true:  A. This individual must be accompanied by a mobility-related assistive device from the point of pick-up to the point of drop-off. B. Transport of this individual by standard passenger vehicle or common carrier is precluded or contraindicated. 8. Period Beginning Date: 9/24/2022   9. Length  [x] Not more than 1 day(s)  [] One Year     Additional Information Relevant to Certification   10. Comments or Explanations, If Necessary or Appropriate   FOOT WOUND, AUTISM, FALL RISK     Certifying Practitioner Information   11. Name of Practitioner: DR. Tatum Arciniega MD   12. [place] Medicaid Provider Number, If Applicable:  [address] Provider Identifier (NPI):      Signature Information   14. Date of Signature: 9/24/22 15. Name of Person Signing: Gabby Ellington   16. Signature and Professional Designation: Electronically signed by ARTEMIO Jacobo on 9/24/2022 at 2:24 PM       [facility] [address]  Rev. 7/2015          [address] Encounter Date/Time: 9/21/2022 [address] [place] [address] Account: [de-identified]    MRN: [id number]    Patient: Vivienne Tam    Contact Serial #: [id number]      ENCOUNTER          Patient Class: I Private Enc? No Unit  [id number]   Hospital Service:  INM   Encounter DX: Cellulitis of left lower*   ADM Provider: Amadeo Day MD   Procedure:     ATT Provider: Francis Pace MD   REF Provider:        Admission DX: Cellulitis of left lower extremity, Sepsis ([facility] [address] Sepsis, due to unspecified organism, unspecified whether acute organ dysfunction present ([facility] [address] and DX codes: L03.116, A41.9, A41.9      PATIENT                 Name: Rianna Gutierrez : 1945 (77 yrs)   Address: [address] Sex: Female   [facility] [address]         Marital Status: Single   Employer: UNEMPLOYED         Taoist: Tenriism   Primary Care Provider: Padmaja Pardo DO         Primary Phone: [phone]   EMERGENCY CONTACT   Contact Name Legal Guardian? Relationship to Patient Home Phone Work Phone   1. Shankar Soares  2. *No Contact Specified*      Other Relative    [phone]                 GUARANTOR            Guarantor: Rianna Gutierrez     : 1945   Address: [facility] [address] Sex: Female   [place] [address]     Relation to Patient: Self       Home Phone: [phone]   Guarantor ID: [id number]       Work Phone:     Guarantor Employer: UNEMPLOYED         Status: NOT EMPLO*      COVERAGE        PRIMARY INSURANCE   Payor: MEDICARE Plan: MEDICARE PART A AND B   Payor Address: [facility] ,  [address] [address]       Group Number:   Insurance Type: INDEMNITY   Subscriber Name: Any Rodriguez : 1945   Subscriber ID: [id number] Pat. Rel. to Sub: Self   SECONDARY INSURANCE   Payor: [address] Address:  [address] [place] [address] [place] , [place] [address], [place], [facility]          Group Number: [id number] Type: INDEMNITY   Subscriber Name: Any Rodriguez : 1945   Subscriber ID: [id number] Pat.  Rel. to Sub: SELF         CSN: [id number]

## 2022-09-21 NOTE — ED NOTES
Received report with no questions/ concerns. Pt resting in position of comfort on cot presenting in no acute/ apparent distress (NAD). Respirations are noted even and unlabored with good rise and fall of the chest observed. Pt updated with current plan of care (POC) and all questions/ concerns addressed. Patient voices no needs at this time. Cot noted in lowest position, locked, with side rails X 2 up for patient safety. Will continue to monitor patient for acute changes.       [x] Side rails up    [x] Cart in lowest position    [] Family at bedside    [x] Call light within reach         [facility], RN  09/21/22 2973

## 2022-09-21 NOTE — H&P
History and Physical      CHIEF COMPLAINT: Leg pain      HISTORY OF PRESENT ILLNESS:      The patient is a 76 y.o. female patient of Dr. Mcelroy Standing history of cellulitis of the legs, lymphedema, asthma, autism, history DVT on chronic apixaban who presents with left leg pain and swelling. This is been worsening for the last week. Patient notes increased pain associate with increased redness. Denies purulent drainage. Exacerbated by movement and touching the area. No relief. Patient states her swelling is actually better than it was last year. Pain and redness are worse now. In ED patient is found to have suspected sepsis secondary to cellulitis. She was febrile to 102.7. White count is elevated at 27.2. Patient is treated with meropenem and vancomycin. She is admitted for further evaluation and treatment. Past Medical History:    Past Medical History:   Diagnosis Date    Asthma     Autistic disorder, residual state     Cellulitis of left leg 8/7/2017    Chronic major depressive disorder, recurrent episode ([address]     Dermatophytosis 8/7/2017    Hx of blood clots     Hyperlipidemia     Hypertension     Hypothyroidism     Lower limb ulcer, calf, left, with fat layer exposed ([address] 1/20/2020    Lymphedema of both lower extremities 8/7/2017    Mental retardation     mild    Obesity     Osteoarthritis     Seizures ([address]     none recent as of 9/18       Past Surgical History:    Past Surgical History:   Procedure Laterality Date    [place] Left 9/14/2018    LEFT EYE CATARACT EXTRACTION IOL IMPLANT  ++LATEX ALLERGY++ performed by Cara Maciel MD at [address] W/O ECP Right 10/12/2018    RIGHT EYE CATARACT EXTRACTION IOL IMPLANT  ++LATEX ALLERGY++ performed by Cara Maciel MD at [address] [facility]  age 8       Medications Prior to Admission:    Not in a hospital admission.     Allergies:    Latex, Cephalosporins, Clindamycin/lincomycin, Duoderm hydroactive [hydroactive dressings], Pcn [penicillins], and Tape [adhesive tape]    Social History:    reports that she has never smoked. She has never used smokeless tobacco. She reports that she does not drink alcohol and does not use drugs. Family History:   family history is not on file. REVIEW OF SYSTEMS:  As above in the HPI, otherwise negative    PHYSICAL EXAM:    Vitals:  BP (!) 165/134   Pulse 99   Temp (!) 100.6 °F (38.1 °C)   Resp 18   Wt 177 lb (80.3 kg)   SpO2 95%   BMI 29.45 kg/m²     General:  Awake, alert, oriented X 2. Well developed, well nourished, well groomed. No apparent distress. HEENT:  Normocephalic, atraumatic. Pupils equal, round, reactive to light. No scleral icterus. No conjunctival injection. Normal lips, teeth, and gums. No nasal discharge. Neck:  Supple  Heart:  RRR, no murmurs, gallops, rubs  Lungs:  CTA bilaterally, bilat symmetrical expansion, no wheeze, rales, or rhonchi  Abdomen:   Bowel sounds present, soft, nontender, no masses, no organomegaly, no peritoneal signs  Extremities:  No clubbing, cyanosis, + bilateral lower extremity edema  Skin: Bilateral lower extremity chronic stasis changes with significant erythema, calor, tenderness left worse than right, shallow ulcers  Neuro:  Cranial nerves 2-12 intact, no focal deficits  Breast: deferred  Rectal: deferred  Genitalia:  deferred    LABS:    CBC with Differential:    Lab Results   Component Value Date/Time    WBC 27.2 09/21/2022 10:15 AM    RBC 3.26 09/21/2022 10:15 AM    HGB 10.4 09/21/2022 10:15 AM    HCT 31.7 09/21/2022 10:15 AM     09/21/2022 10:15 AM    MCV 97.2 09/21/2022 10:15 AM    MCH 31.9 09/21/2022 10:15 AM    MCHC 32.8 09/21/2022 10:15 AM    RDW 13.2 09/21/2022 10:15 AM    LYMPHOPCT 2.6 09/21/2022 10:15 AM    MONOPCT 3.5 09/21/2022 10:15 AM    BASOPCT 0.2 09/21/2022 10:15 AM    MONOSABS 1.09 09/21/2022 10:15 AM    LYMPHSABS 0.82 09/21/2022 10:15 AM EOSABS 0.24 09/21/2022 10:15 AM    BASOSABS 0.00 09/21/2022 10:15 AM     CMP:    Lab Results   Component Value Date/Time     09/21/2022 10:15 AM    K 4.7 09/21/2022 10:15 AM    CL 98 09/21/2022 10:15 AM    CO2 24 09/21/2022 10:15 AM    BUN 30 09/21/2022 10:15 AM    CREATININE 1.3 09/21/2022 10:15 AM    GFRAA 48 09/21/2022 10:15 AM    LABGLOM 39 09/21/2022 10:15 AM    GLUCOSE 146 09/21/2022 10:15 AM    PROT 7.8 09/21/2022 10:15 AM    LABALBU 3.0 09/21/2022 10:15 AM    CALCIUM 8.5 09/21/2022 10:15 AM    BILITOT 0.3 09/21/2022 10:15 AM    ALKPHOS 110 09/21/2022 10:15 AM    AST 40 09/21/2022 10:15 AM    ALT 16 09/21/2022 10:15 AM     Magnesium:    Lab Results   Component Value Date/Time    MG 1.9 12/11/2019 07:42 AM     Phosphorus:    Lab Results   Component Value Date/Time    PHOS 3.3 09/22/2019 04:36 AM     PT/INR:  No results found for: PROTIME, INR  Last 3 Troponin:    Lab Results   Component Value Date/Time    TROPONINI 0.01 12/22/2020 02:12 PM    TROPONINI <0.01 07/12/2020 12:17 AM    TROPONINI 0.03 09/20/2019 12:03 PM     U/A:    Lab Results   Component Value Date/Time    COLORU Yellow 12/22/2020 05:28 PM    PROTEINU TRACE 12/22/2020 05:28 PM    PHUR 8.0 12/22/2020 05:28 PM    LABCAST FEW 12/05/2019 02:28 PM    WBCUA 5-10 12/22/2020 05:28 PM    RBCUA 1-3 12/22/2020 05:28 PM    BACTERIA MANY 12/22/2020 05:28 PM    CLARITYU CLOUDY 12/22/2020 05:28 PM    SPECGRAV 1.015 12/22/2020 05:28 PM    LEUKOCYTESUR TRACE 12/22/2020 05:28 PM    UROBILINOGEN 1.0 12/22/2020 05:28 PM    BILIRUBINUR Negative 12/22/2020 05:28 PM    BLOODU SMALL 12/22/2020 05:28 PM    GLUCOSEU Negative 12/22/2020 05:28 PM    AMORPHOUS FEW 12/22/2020 05:28 PM     ABG:  No results found for: PH, PCO2, PO2, HCO3, BE, THGB, TCO2, O2SAT  HgBA1c:  No results found for: LABA1C  FLP:  No results found for: TRIG, HDL, LDLCALC, LDLDIRECT, LABVLDL  TSH:  No results found for: TSH    US ABDOMEN LIMITED   Final Result   Unremarkable right upper quadrant

## 2022-09-22 LAB
ANION GAP SERPL CALCULATED.3IONS-SCNC: 12 MMOL/L (ref 7–16)
BUN BLDV-MCNC: 26 MG/DL (ref 6–23)
CALCIUM SERPL-MCNC: 8.3 MG/DL (ref 8.6–10.2)
CHLORIDE BLD-SCNC: 104 MMOL/L (ref 98–107)
CO2: 23 MMOL/L (ref 22–29)
CREAT SERPL-MCNC: 1.1 MG/DL (ref 0.5–1)
GFR AFRICAN AMERICAN: 58
GFR NON-AFRICAN AMERICAN: 48 ML/MIN/1.73
GLUCOSE BLD-MCNC: 98 MG/DL (ref 74–99)
POTASSIUM REFLEX MAGNESIUM: 3.7 MMOL/L (ref 3.5–5)
SODIUM BLD-SCNC: 139 MMOL/L (ref 132–146)
TSH SERPL DL<=0.05 MIU/L-ACNC: 0.18 UIU/ML (ref 0.27–4.2)

## 2022-09-22 PROCEDURE — 84443 ASSAY THYROID STIM HORMONE: CPT

## 2022-09-22 PROCEDURE — 2060000000 HC ICU INTERMEDIATE R&B

## 2022-09-22 PROCEDURE — 2580000003 HC RX 258: Performed by: INTERNAL MEDICINE

## 2022-09-22 PROCEDURE — 6360000002 HC RX W HCPCS: Performed by: INTERNAL MEDICINE

## 2022-09-22 PROCEDURE — 80048 BASIC METABOLIC PNL TOTAL CA: CPT

## 2022-09-22 PROCEDURE — 6370000000 HC RX 637 (ALT 250 FOR IP): Performed by: INTERNAL MEDICINE

## 2022-09-22 PROCEDURE — 36415 COLL VENOUS BLD VENIPUNCTURE: CPT

## 2022-09-22 RX ADMIN — HYDRALAZINE HYDROCHLORIDE 25 MG: 25 TABLET, FILM COATED ORAL at 00:06

## 2022-09-22 RX ADMIN — LEVOTHYROXINE SODIUM 188 MCG: 0.1 TABLET ORAL at 10:34

## 2022-09-22 RX ADMIN — APIXABAN 5 MG: 5 TABLET, FILM COATED ORAL at 08:28

## 2022-09-22 RX ADMIN — ATORVASTATIN CALCIUM 10 MG: 10 TABLET, FILM COATED ORAL at 08:27

## 2022-09-22 RX ADMIN — VANCOMYCIN HYDROCHLORIDE 1000 MG: 1 INJECTION, POWDER, LYOPHILIZED, FOR SOLUTION INTRAVENOUS at 18:17

## 2022-09-22 RX ADMIN — SODIUM CHLORIDE: 9 INJECTION, SOLUTION INTRAVENOUS at 14:38

## 2022-09-22 RX ADMIN — MONTELUKAST 10 MG: 10 TABLET, FILM COATED ORAL at 20:11

## 2022-09-22 RX ADMIN — SODIUM CHLORIDE, PRESERVATIVE FREE 10 ML: 5 INJECTION INTRAVENOUS at 00:15

## 2022-09-22 RX ADMIN — MONTELUKAST 10 MG: 10 TABLET, FILM COATED ORAL at 00:07

## 2022-09-22 RX ADMIN — HYDRALAZINE HYDROCHLORIDE 25 MG: 25 TABLET, FILM COATED ORAL at 08:28

## 2022-09-22 RX ADMIN — DULOXETINE HYDROCHLORIDE 60 MG: 60 CAPSULE, DELAYED RELEASE ORAL at 08:28

## 2022-09-22 RX ADMIN — HYDRALAZINE HYDROCHLORIDE 25 MG: 25 TABLET, FILM COATED ORAL at 20:12

## 2022-09-22 RX ADMIN — SENNOSIDES 17.2 MG: 8.6 TABLET, FILM COATED ORAL at 20:19

## 2022-09-22 RX ADMIN — ARIPIPRAZOLE 5 MG: 5 TABLET ORAL at 20:12

## 2022-09-22 RX ADMIN — OXYCODONE AND ACETAMINOPHEN 1 TABLET: 5; 325 TABLET ORAL at 20:19

## 2022-09-22 RX ADMIN — CARVEDILOL 25 MG: 25 TABLET, FILM COATED ORAL at 20:11

## 2022-09-22 RX ADMIN — CARVEDILOL 25 MG: 25 TABLET, FILM COATED ORAL at 00:07

## 2022-09-22 RX ADMIN — ARIPIPRAZOLE 5 MG: 5 TABLET ORAL at 00:08

## 2022-09-22 RX ADMIN — MEROPENEM 1000 MG: 1 INJECTION, POWDER, FOR SOLUTION INTRAVENOUS at 03:55

## 2022-09-22 RX ADMIN — PHENYTOIN SODIUM 400 MG: 100 CAPSULE ORAL at 20:11

## 2022-09-22 RX ADMIN — APIXABAN 5 MG: 5 TABLET, FILM COATED ORAL at 20:12

## 2022-09-22 RX ADMIN — MEROPENEM 1000 MG: 1 INJECTION, POWDER, FOR SOLUTION INTRAVENOUS at 14:37

## 2022-09-22 RX ADMIN — Medication 1 TABLET: at 08:27

## 2022-09-22 RX ADMIN — PHENYTOIN SODIUM 400 MG: 100 CAPSULE ORAL at 00:07

## 2022-09-22 RX ADMIN — SODIUM CHLORIDE: 9 INJECTION, SOLUTION INTRAVENOUS at 00:18

## 2022-09-22 RX ADMIN — APIXABAN 5 MG: 5 TABLET, FILM COATED ORAL at 00:07

## 2022-09-22 RX ADMIN — FAMOTIDINE 20 MG: 20 TABLET, FILM COATED ORAL at 08:28

## 2022-09-22 RX ADMIN — CARVEDILOL 25 MG: 25 TABLET, FILM COATED ORAL at 08:28

## 2022-09-22 NOTE — CARE COORDINATION
Patient presented to Ed from [facility] with c/o pain in BLE and elevated temperature. ID consulted. Patient started on IV Merrem. Met with patient at bedside to discuss transition of care. She plans to return to [facility] where she is a longterm patient per facilty staff nurse. Message left on the facility's liason [employer] mail inquiring regarding status of patient. Await return call. Ambulette form needs signed and dated at discharge and is in envelope in soft chart. Will await if covid needed when facility liasion returns call. CM/SW will continue to follow.

## 2022-09-22 NOTE — PROGRESS NOTES
Hospitalist Progress Note      SYNOPSIS: Patient admitted on 2022     Patient is a 76 y.o. female patient of Dr. Hill Failing history of cellulitis of the legs, lymphedema, asthma, autism, history DVT on chronic apixaban who presents with left leg pain and swelling. This is been worsening for the last week. Patient notes increased pain associate with increased redness. Denies purulent drainage. Exacerbated by movement and touching the area. No relief. Patient states her swelling is actually better than it was last year. Pain and redness are worse now. In ED patient is found to have suspected sepsis secondary to cellulitis. She was febrile to 102.7. White count is elevated at 27.2. Patient is treated with meropenem and vancomycin. She is admitted for further evaluation and treatment      SUBJECTIVE:    Patient was yelling during encounter, wanting to go to her NH, stating she is not sick anymore  Denies pains or other concerns   Records reviewed. Stable overnight. No overnight issues reported    Temp (24hrs), Av.9 °F (37.7 °C), Min:97.6 °F (36.4 °C), Max:102.7 °F (39.3 °C)    DIET: ADULT DIET; Regular  CODE: Full Code    Intake/Output Summary (Last 24 hours) at 2022 0912  Last data filed at 2022 1933  Gross per 24 hour   Intake 1400 ml   Output --   Net 1400 ml       OBJECTIVE:    /63   Pulse 81   Temp 97.6 °F (36.4 °C) (Temporal)   Resp 22   Ht 5' 2\" (1.575 m)   Wt 177 lb (80.3 kg)   SpO2 92%   BMI 32.37 kg/m²     General appearance: No apparent distress, appears stated age and cooperative. HEENT:  Normocephalic. Conjunctivae/corneas clear. Moist mucosa   Neck: Supple. No jugular venous distention. Thyroid not visible, non tender   Respiratory: Normal respiratory effort. Clear to auscultation bilaterally. No stridor/wheezing/rhonchi/crackles   Cardiovascular: Regular heart beats, normal S1-S2.  No M/G/R  Abdomen: Non distended, soft, non tender, no visceromegaly, no mass, normal bowel sounds   Musculoskeletal: No clubbing, cyanosis, no bilateral lower extremity edema. Brisk capillary refill. Skin:  Left leg calor and rubor from foot to above knee,   Neurologic: awake, alert and oriented x 3. Following commands. No focal neurological deficit. Cranial nerves 2-12 grossly normal      ASSESSMENT:    Sepsis   Lymphedema of both lower extremities  Cellulitis of left lower extremity   Autistic disorder   Chronic anticoagulation   History of DVT (deep vein thrombosis)         PLAN:    Continue IV abx with just vanco. DC meropenem.  ID following  Follow up cultures   Wound care   Monitor labs closely  Her home meds have been resumed   PT/OT  DVT PPx  DC planning         DVT prophylaxis eliquis     DISPOSITION:     Medications:  REVIEWED DAILY    Infusion Medications    sodium chloride 75 mL/hr at 09/22/22 0018    sodium chloride       Scheduled Medications    apixaban  5 mg Oral BID    ARIPiprazole  5 mg Oral Nightly    carvedilol  25 mg Oral BID    DULoxetine  60 mg Oral Daily    famotidine  20 mg Oral Daily    hydrALAZINE  25 mg Oral BID    levothyroxine  188 mcg Oral Daily    montelukast  10 mg Oral Nightly    therapeutic multivitamin-minerals  1 tablet Oral Daily    phenytoin  400 mg Oral Nightly    senna  2 tablet Oral Nightly    atorvastatin  10 mg Oral Daily    sodium chloride flush  10 mL IntraVENous 2 times per day    vancomycin  15 mg/kg IntraVENous Q24H    meropenem  1,000 mg IntraVENous Q12H     PRN Meds: ipratropium-albuterol, oxyCODONE-acetaminophen, sodium chloride flush, sodium chloride, ondansetron **OR** ondansetron, magnesium hydroxide, acetaminophen **OR** acetaminophen    Labs:     Recent Labs     09/21/22  1015   WBC 27.2*   HGB 10.4*   HCT 31.7*          Recent Labs     09/21/22  1015 09/22/22  0621    139   K 4.7 3.7   CL 98 104   CO2 24 23   BUN 30* 26*   CREATININE 1.3* 1.1*   CALCIUM 8.5* 8.3*       Recent Labs     09/21/22  1015   PROT 7.8   ALKPHOS 110*   ALT 16   AST 40*   BILITOT 0.3       No results for input(s): INR in the last 72 hours. No results for input(s): Ardelle Chalk in the last 72 hours. Chronic labs:    Lab Results   Component Value Date    TSH 0.177 (L) 09/22/2022       Radiology: REVIEWED DAILY    +++++++++++++++++++++++++++++++++++++++++++++++++  Frederick Gracia MD  [facility] [place] Physician - [address] [place] [address] Rd, [address]  +++++++++++++++++++++++++++++++++++++++++++++++++  NOTE: This report was transcribed using voice recognition software. Every effort was made to ensure accuracy; however, inadvertent computerized transcription errors may be present.

## 2022-09-22 NOTE — CONSULTS
[facility] CONSULT NOTE    Reason for Consult: Cellulitis   Requested by: Paul Jaeger MD     Chief complaint: Leg pain    History Obtained From: EMR and patient    HISTORY OFPRESENT ILLNESS              The patient is a 76 y.o. female with history of developmental delay, hypertension, hyperlipidemia, bilateral lower extremity lymphedema, lower extremity cellulitis seen by Dr. Nidhi Contreras in 2019, presented on 09/21 with bilateral leg pain accompanied by fever and malaise for 2-3 days, found to be septic with fever of 102.7 F, leukocytosis of 27,000, elevated procalcitonin of 0.33 ng/mL. CXR and RUQ US were unremarkable. Right hip xray showed extensive remodeling of right acetabulum and femoral osseous tip articulation likely related to degenerative arthropathy unchanged since 2019. Meropenem and vancomycin were started on admission. ID service was subsequently consulted for further recommendations.     Past Medical History  Past Medical History:   Diagnosis Date    Asthma     Autistic disorder, residual state     Cellulitis of left leg 8/7/2017    Chronic major depressive disorder, recurrent episode ([address]     Dermatophytosis 8/7/2017    Hx of blood clots     Hyperlipidemia     Hypertension     Hypothyroidism     Lower limb ulcer, calf, left, with fat layer exposed ([address] 1/20/2020    Lymphedema of both lower extremities 8/7/2017    Mental retardation     mild    Obesity     Osteoarthritis     Seizures ([address]     none recent as of 9/18       Current Facility-Administered Medications   Medication Dose Route Frequency Provider Last Rate Last Admin    apixaban (ELIQUIS) tablet 5 mg  5 mg Oral BID Tempinidhi Jaeger MD   5 mg at 09/22/22 0828    ARIPiprazole (ABILIFY) tablet 5 mg  5 mg Oral Nightly Tempie MD Mil   5 mg at 09/22/22 0008    carvedilol (COREG) tablet 25 mg  25 mg Oral BID Tempie MD Mil   25 mg at 09/22/22 0828    DULoxetine (CYMBALTA) extended release capsule 60 mg  60 mg Oral Daily Selena Jacinto MD   60 mg at 09/22/22 0828    famotidine (PEPCID) tablet 20 mg  20 mg Oral Daily Selena Jacinto MD   20 mg at 09/22/22 1381    hydrALAZINE (APRESOLINE) tablet 25 mg  25 mg Oral BID Selena Jacinto MD   25 mg at 09/22/22 2558    ipratropium-albuterol (DUONEB) nebulizer solution 3 mL  1 vial Inhalation Q4H PRN Selena Jacinto MD        levothyroxine (SYNTHROID) tablet 188 mcg  188 mcg Oral Daily Selena Jacinto MD        montelukast (SINGULAIR) tablet 10 mg  10 mg Oral Nightly Selena Jacinto MD   10 mg at 09/22/22 0007    therapeutic multivitamin-minerals 1 tablet  1 tablet Oral Daily Selena Jacinto MD   1 tablet at 09/22/22 0827    oxyCODONE-acetaminophen (PERCOCET) 5-325 MG per tablet 1 tablet  1 tablet Oral Q4H PRN Selena Jacinto MD        phenytoin (DILANTIN) ER capsule 400 mg  400 mg Oral Nightly Selena Jacinto MD   400 mg at 09/22/22 0007    senna (SENOKOT) tablet 17.2 mg  2 tablet Oral Nightly Selena Jacinto MD        atorvastatin (LIPITOR) tablet 10 mg  10 mg Oral Daily Selena Jacinto MD   10 mg at 09/22/22 0827    0.9 % sodium chloride infusion   IntraVENous Continuous Selena Jacinto MD 75 mL/hr at 09/22/22 0018 New Bag at 09/22/22 0018    sodium chloride flush 0.9 % injection 10 mL  10 mL IntraVENous 2 times per day Selena Jacinto MD   10 mL at 09/22/22 0015    sodium chloride flush 0.9 % injection 10 mL  10 mL IntraVENous PRN Selena Jacinto MD        0.9 % sodium chloride infusion   IntraVENous PRN Selena Jacinto MD        ondansetron (ZOFRAN-ODT) disintegrating tablet 4 mg  4 mg Oral Q8H PRN Selena Jacinto MD        Or    ondansetron [facility]) injection 4 mg  4 mg IntraVENous Q6H PRN Selena Jacinto MD        magnesium hydroxide (MILK OF MAGNESIA) 400 MG/5ML suspension 30 mL  30 mL Oral Daily PRN Selena Jacinto MD        acetaminophen (TYLENOL) tablet 650 mg  650 mg Oral Q6H PRN Selena Jacinto MD Or    acetaminophen (TYLENOL) suppository 650 mg  650 mg Rectal Q6H PRN Dung Alexander MD        vancomycin (VANCOCIN) 1,250 mg in dextrose 5 % 250 mL IVPB  15 mg/kg IntraVENous Q24H Dung Alexander MD        meropenem [facility]) 1,000 mg in sodium chloride 0.9 % 100 mL IVPB (mini-bag)  1,000 mg IntraVENous Q12H Dung Alexander MD   Stopped at 09/22/22 1534       Allergies   Allergen Reactions    Latex     Cephalosporins     Clindamycin/Lincomycin     Duoderm Hydroactive [Hydroactive Dressings]     Pcn [Penicillins]     Tape Aneudy Blackbird Tape]      surgical       Surgical History  Past Surgical History:   Procedure Laterality Date    APPENDECTOMY  1969    VT CORNEAL TRANSPLANT,ENDOTHELIAL Left 9/14/2018    LEFT EYE CATARACT EXTRACTION IOL IMPLANT  ++LATEX ALLERGY++ performed by Meghan Cano MD at [facility] RMVL INSJ IO LENS PROSTH W/O ECP Right 10/12/2018    RIGHT EYE CATARACT EXTRACTION IOL IMPLANT  ++LATEX ALLERGY++ performed by Meghan Cano MD at [address]  age 8        Social History  Social History     Socioeconomic History    Marital status: Single   Tobacco Use    Smoking status: Never    Smokeless tobacco: Never   Vaping Use    Vaping Use: Never used   Substance and Sexual Activity    Alcohol use: No    Drug use: No       Family Medical History  History reviewed. No pertinent family history.     Review of Systems:  Constitutional: Had fever, no chills  Eyes: No vision changes, no retroorbital pain  ENT: No hearing changes, no ear pain  Respiratory: No cough, no dyspnea  Cardiovascular: No chest pain, no palpitations  Gastrointestinal: No abdominal pain, no diarrhea  Genitourinary: No dysuria, no hematuria  Integumentary: No rash, no itching  Musculoskeletal: No muscle pain, no joint pain  Neurologic: No headache, no numbness in extremities    Physical Examination:  Vitals:    09/21/22 0942 09/21/22 2345 09/22/22 0752 09/22/22 0815   BP: (!) 165/134 137/60 133/63 Pulse: 99 85 81    Resp: 18 20 22    Temp: (!) 100.6 °F (38.1 °C) 98.8 °F (37.1 °C) 97.6 °F (36.4 °C)    TempSrc:  Temporal Temporal    SpO2: 95% 93% 92%    Weight:       Height:    5' 2\" (1.575 m)     Constitutional: Alert, not in distress  Eyes: Sclerae anicteric, no conjunctival erythema  ENT: No buccal lesion, no pharyngeal exudates  Neck: No nuchal rigidity, no cervical adenopathy  Lungs: Clear breath sounds, no crackles, no wheezes  Heart: Regular rate and rhythm, no murmurs  Abdomen: Bowel sounds present, soft, nontender  Skin: Warm and dry, no active dermatoses  Musculoskeletal: Bilateral lower leg swollen. Left lower leg erythematous with skin excoriations and crusted blood on toes. Labs, imaging, and medical records/notes were personally reviewed. Assessment:  Sepsis, secondary to left leg cellulitis  Chronic bilateral lower extremity lymphedema    Plan:  Continue vancomycin dosed according to level per Pharmacy. Stop meropenem. Follow up blood cultures. Continue local wound care. Thank you for involving me in the care of Dora Lindquist. I will continue to follow. Please do not hesitate to call for any questions or concerns.     Electronically signed by Anna Mckeon MD on 9/22/2022 at 10:09 AM

## 2022-09-22 NOTE — PROGRESS NOTES
Pharmacy Consultation Note  (Antibiotic Dosing and Monitoring)    Initial consult date: 9/22/22  Consulting physician/provider: Makenzie Shepard  Drug: Vancomycin  Indication: SSTI    Age/  Gender Height Weight IBW  Allergy Information   77 y.o./female 5' 2\" (157.5 cm) 177 lb (80.3 kg)     Ideal body weight: 50.1 kg (110 lb 7.2 oz)  Adjusted ideal body weight: 62.2 kg (137 lb 1.1 oz)   Latex, Cephalosporins, Clindamycin/lincomycin, Duoderm hydroactive [hydroactive dressings], Pcn [penicillins], and Tape [adhesive tape]      Renal Function:  Recent Labs     09/21/22  1015 09/22/22  0621   BUN 30* 26*   CREATININE 1.3* 1.1*       Intake/Output Summary (Last 24 hours) at 9/22/2022 1042  Last data filed at 9/21/2022 1933  Gross per 24 hour   Intake 1400 ml   Output --   Net 1400 ml       Vancomycin Monitoring:  Trough:  No results for input(s): VANCOTROUGH in the last 72 hours. Random:  No results for input(s): VANCORANDOM in the last 72 hours. No results for input(s): Danial Grates in the last 72 hours. Historical Cultures:  Organism   Date Value Ref Range Status   09/20/2019 Pseudomonas aeruginosa (A)  Final     No results for input(s): BC in the last 72 hours. Vancomycin Administration Times:  Recent vancomycin administrations                     vancomycin 1500 mg in dextrose 5% 300 mL IVPB (mg) 1,500 mg New Bag 09/21/22 1612                    Assessment:  Patient is a 76 y.o. female who has been initiated on vancomycin  Estimated Creatinine Clearance: 41 mL/min (A) (based on SCr of 1.1 mg/dL (H)). To dose vancomycin, pharmacy will be utilizing [employer] calculation software for goal AUC/SHER 400-600 mg/L-hr    Plan: Will continue vancomycin 1000 mg IV every 24 hours. Will check vancomycin levels when appropriate. Will continue to monitor renal function   Clinical pharmacy to follow. Thank you for your consult. Herminia Garvey, LeidyD, [facility], BCPS 9/22/2022 10:43 AM

## 2022-09-22 NOTE — ED NOTES
Pt resting in position of comfort on cot presenting in no acute/ apparent distress (NAD). Respirations are noted even and unlabored with good rise and fall of the chest observed. Pt updated with current plan of care (POC) and all questions/ concerns addressed. Patient voices no needs at this time. Cot noted in lowest position, locked, with side rails X 2 up for patient safety. Will continue to monitor patient for acute changes.       [x] Side rails up    [x] Cart in lowest position    [] Family at bedside    [x] Call light within reach         Eleanor [facility], RN  09/21/22 4126

## 2022-09-22 NOTE — ACP (ADVANCE CARE PLANNING)
Advance Care Planning   Healthcare Decision Maker:  [facility]. Decision maker  Brian David 6   Sister in law      Per conversation with patient at bedside

## 2022-09-22 NOTE — PROGRESS NOTES
This patient is on medication that requires renal, weight, and/or indication dose adjustment. Date Body Weight IBW  Adjusted BW SCr  CrCl Dialysis status   9/21/2022 177 lb (80.3 kg) Patient height not recorded Creatinine clearance cannot be calculated (Unknown ideal weight.) N/a       Pharmacy has dose-adjusted the following medication(s):    Date Previous Order Adjusted Order   9/21/2022 Meropenem 1,000mg IV q8h Meropenem 1,000mg IV q12h       These changes were made per protocol according to the [address] for Pharmacists. *Please note this dose may need readjusted if patient's condition changes. Please contact pharmacy with any questions regarding these changes.     Aubree Norris PharmD, RP, BCPS 9/21/2022 11:12 PM

## 2022-09-22 NOTE — PROGRESS NOTES
Patient sent up from ER without nurse to nurse report given. Attempted to call for report three times and no answer.

## 2022-09-23 LAB — VANCOMYCIN RANDOM: 15.9 MCG/ML (ref 5–40)

## 2022-09-23 PROCEDURE — 6360000002 HC RX W HCPCS: Performed by: INTERNAL MEDICINE

## 2022-09-23 PROCEDURE — 36415 COLL VENOUS BLD VENIPUNCTURE: CPT

## 2022-09-23 PROCEDURE — 2580000003 HC RX 258: Performed by: INTERNAL MEDICINE

## 2022-09-23 PROCEDURE — 6370000000 HC RX 637 (ALT 250 FOR IP): Performed by: INTERNAL MEDICINE

## 2022-09-23 PROCEDURE — 2060000000 HC ICU INTERMEDIATE R&B

## 2022-09-23 PROCEDURE — 80202 ASSAY OF VANCOMYCIN: CPT

## 2022-09-23 RX ORDER — LINEZOLID 600 MG/1
600 TABLET, FILM COATED ORAL 2 TIMES DAILY
Qty: 10 TABLET | Refills: 0 | Status: SHIPPED | OUTPATIENT
Start: 2022-09-23 | End: 2022-09-23 | Stop reason: HOSPADM

## 2022-09-23 RX ADMIN — VANCOMYCIN HYDROCHLORIDE 1000 MG: 1 INJECTION, POWDER, LYOPHILIZED, FOR SOLUTION INTRAVENOUS at 11:32

## 2022-09-23 RX ADMIN — LEVOTHYROXINE SODIUM 188 MCG: 0.1 TABLET ORAL at 06:16

## 2022-09-23 RX ADMIN — SENNOSIDES 17.2 MG: 8.6 TABLET, FILM COATED ORAL at 20:05

## 2022-09-23 RX ADMIN — PHENYTOIN SODIUM 400 MG: 100 CAPSULE ORAL at 20:05

## 2022-09-23 RX ADMIN — APIXABAN 5 MG: 5 TABLET, FILM COATED ORAL at 20:05

## 2022-09-23 RX ADMIN — Medication 1 TABLET: at 09:36

## 2022-09-23 RX ADMIN — MONTELUKAST 10 MG: 10 TABLET, FILM COATED ORAL at 20:06

## 2022-09-23 RX ADMIN — HYDRALAZINE HYDROCHLORIDE 25 MG: 25 TABLET, FILM COATED ORAL at 09:36

## 2022-09-23 RX ADMIN — APIXABAN 5 MG: 5 TABLET, FILM COATED ORAL at 09:35

## 2022-09-23 RX ADMIN — ARIPIPRAZOLE 5 MG: 5 TABLET ORAL at 20:06

## 2022-09-23 RX ADMIN — SODIUM CHLORIDE: 9 INJECTION, SOLUTION INTRAVENOUS at 18:36

## 2022-09-23 RX ADMIN — DULOXETINE HYDROCHLORIDE 60 MG: 60 CAPSULE, DELAYED RELEASE ORAL at 09:36

## 2022-09-23 RX ADMIN — SODIUM CHLORIDE, PRESERVATIVE FREE 10 ML: 5 INJECTION INTRAVENOUS at 20:06

## 2022-09-23 RX ADMIN — CARVEDILOL 25 MG: 25 TABLET, FILM COATED ORAL at 09:36

## 2022-09-23 RX ADMIN — ATORVASTATIN CALCIUM 10 MG: 10 TABLET, FILM COATED ORAL at 09:34

## 2022-09-23 RX ADMIN — CARVEDILOL 25 MG: 25 TABLET, FILM COATED ORAL at 20:05

## 2022-09-23 RX ADMIN — HYDRALAZINE HYDROCHLORIDE 25 MG: 25 TABLET, FILM COATED ORAL at 20:05

## 2022-09-23 RX ADMIN — FAMOTIDINE 20 MG: 20 TABLET, FILM COATED ORAL at 09:36

## 2022-09-23 NOTE — CARE COORDINATION
Care Coordination  Spoke to Lucía Rincon at [employer]. Per Dannielle she would like to skill the patient upon discharge. Have ordered therapies. She was admitted  due to left leg cellulitis. Id Dr Gerson Franco on the case. Blood cultures drawn. The patient was started on Iv Vancomycin. Wound care consulted. Await final blood cultures. Return envelope done for ambulette. Will need a covid test upon discharge.  Will follow

## 2022-09-23 NOTE — PROGRESS NOTES
Pharmacy Consultation Note  (Antibiotic Dosing and Monitoring)    Initial consult date: 9/22/22  Consulting physician/provider: Keara Redd  Drug: Vancomycin  Indication: SSTI    Age/  Gender Height Weight IBW  Allergy Information   77 y.o./female 5' 2\" (157.5 cm) 177 lb (80.3 kg)     Ideal body weight: 50.1 kg (110 lb 7.2 oz)  Adjusted ideal body weight: 62.2 kg (137 lb 1.1 oz)   Latex, Cephalosporins, Clindamycin/lincomycin, Duoderm hydroactive [hydroactive dressings], Pcn [penicillins], and Tape [adhesive tape]      Renal Function:  Recent Labs     09/21/22  1015 09/22/22  0621   BUN 30* 26*   CREATININE 1.3* 1.1*         Intake/Output Summary (Last 24 hours) at 9/23/2022 0930  Last data filed at 9/23/2022 0904  Gross per 24 hour   Intake 1648.02 ml   Output --   Net 1648.02 ml         Vancomycin Monitoring:  Trough:  No results for input(s): VANCOTROUGH in the last 72 hours. Random:    Recent Labs     09/23/22  0647   VANCORANDOM 15.9       Recent Labs     09/21/22  1015   BLOODCULT2 24 Hours no growth          Historical Cultures:  Organism   Date Value Ref Range Status   09/20/2019 Pseudomonas aeruginosa (A)  Final     Recent Labs     09/21/22  1015   BC 24 Hours no growth     Vancomycin Administration Times:  Recent vancomycin administrations                     vancomycin (VANCOCIN) 1,000 mg in dextrose 5 % 250 mL IVPB ([id number]) (mg) 1,000 mg New Bag 09/22/22 1817    vancomycin 1500 mg in dextrose 5% 300 mL IVPB (mg) 1,500 mg New Bag 09/21/22 1612                        Assessment:  Patient is a 76 y.o. female who has been initiated on vancomycin  Estimated Creatinine Clearance: 41 mL/min (A) (based on SCr of 1.1 mg/dL (H)). To dose vancomycin, pharmacy will be utilizing [employer] calculation software for goal AUC/SHER 400-600 mg/L-hr  9/23: Random vanco level = 15.9 mcg/ml (~12.5 hours post dose). Give next dose now (adjusted timing).      Plan:  Continue vancomycin 1000 mg IV every 24 hours (estimated , trough 14.0)  Will check vancomycin levels when appropriate. Will continue to monitor renal function   Clinical pharmacy to follow.       Diane Odonnell PharmD, BCPS 9/23/2022 10:02 AM

## 2022-09-23 NOTE — PROGRESS NOTES
Hospitalist Progress Note      SYNOPSIS: Patient admitted on 2022     Patient is a 76 y.o. female patient of Dr. Umm Shin history of cellulitis of the legs, lymphedema, asthma, autism, history DVT on chronic apixaban who presents with left leg pain and swelling. This is been worsening for the last week. Patient notes increased pain associate with increased redness. Denies purulent drainage. Exacerbated by movement and touching the area. No relief. Patient states her swelling is actually better than it was last year. Pain and redness are worse now. In ED patient is found to have suspected sepsis secondary to cellulitis. She was febrile to 102.7. White count is elevated at 27.2. Patient is treated with meropenem and vancomycin. She is admitted for further evaluation and treatment      SUBJECTIVE:    Patient states she is doing better and wants to leave. Denies pain, nausea, vomiting, sob  Records reviewed. Stable overnight. No overnight issues reported    Temp (24hrs), Av.3 °F (36.3 °C), Min:96.8 °F (36 °C), Max:97.9 °F (36.6 °C)    DIET: ADULT DIET; Regular  CODE: Full Code    Intake/Output Summary (Last 24 hours) at 2022 0859  Last data filed at 2022 1734  Gross per 24 hour   Intake 1408.02 ml   Output --   Net 1408.02 ml       OBJECTIVE:    BP (!) 159/69   Pulse 76   Temp 97.3 °F (36.3 °C) (Temporal)   Resp 20   Ht 5' 2\" (1.575 m)   Wt 177 lb (80.3 kg)   SpO2 94%   BMI 32.37 kg/m²     General appearance: No apparent distress, appears stated age and cooperative. HEENT:  Normocephalic. Conjunctivae/corneas clear. Moist mucosa   Neck: Supple. No jugular venous distention. Thyroid not visible, non tender   Respiratory: Normal respiratory effort. Clear to auscultation bilaterally. No stridor/wheezing/rhonchi/crackles   Cardiovascular: Regular heart beats, normal S1-S2.  No M/G/R  Abdomen: Non distended, soft, non tender, no visceromegaly, no mass, normal bowel sounds Musculoskeletal: No clubbing, cyanosis, no bilateral lower extremity edema. Brisk capillary refill. Skin:  Left leg calor and rubor from foot to above knee,   Neurologic: awake, alert and oriented x 3. Following commands. No focal neurological deficit. Cranial nerves 2-12 grossly normal      ASSESSMENT:    Lymphedema of both lower extremities  Cellulitis of left lower extremity   Autistic disorder   Chronic anticoagulation   History of DVT (deep vein thrombosis)         PLAN:    Continue IV abx with just vanco. DC meropenem. ID following  Follow up cultures. BC no grow since 9/21    Wound care is still pending.  Consult request placed again   Monitor labs closely  Her home meds have been resumed   PT/OT  DVT PPx  DC planning         DVT prophylaxis eliquis     DISPOSITION:     Medications:  REVIEWED DAILY    Infusion Medications    sodium chloride 75 mL/hr at 09/22/22 1734    sodium chloride       Scheduled Medications    vancomycin  1,000 mg IntraVENous Q24H    apixaban  5 mg Oral BID    ARIPiprazole  5 mg Oral Nightly    carvedilol  25 mg Oral BID    DULoxetine  60 mg Oral Daily    famotidine  20 mg Oral Daily    hydrALAZINE  25 mg Oral BID    levothyroxine  188 mcg Oral Daily    montelukast  10 mg Oral Nightly    therapeutic multivitamin-minerals  1 tablet Oral Daily    phenytoin  400 mg Oral Nightly    senna  2 tablet Oral Nightly    atorvastatin  10 mg Oral Daily    sodium chloride flush  10 mL IntraVENous 2 times per day     PRN Meds: ipratropium-albuterol, oxyCODONE-acetaminophen, sodium chloride flush, sodium chloride, ondansetron **OR** ondansetron, magnesium hydroxide, acetaminophen **OR** acetaminophen    Labs:     Recent Labs     09/21/22  1015   WBC 27.2*   HGB 10.4*   HCT 31.7*          Recent Labs     09/21/22  1015 09/22/22  0621    139   K 4.7 3.7   CL 98 104   CO2 24 23   BUN 30* 26*   CREATININE 1.3* 1.1*   CALCIUM 8.5* 8.3*       Recent Labs     09/21/22  1015   PROT 7.8   ALKPHOS 110*   ALT 16   AST 40*   BILITOT 0.3       No results for input(s): INR in the last 72 hours. No results for input(s): [place] in the last 72 hours. Chronic labs:    Lab Results   Component Value Date    TSH 0.177 (L) 09/22/2022       Radiology: REVIEWED DAILY    +++++++++++++++++++++++++++++++++++++++++++++++++  Royer Tong MD  [facility] - [address] [place] [address]  +++++++++++++++++++++++++++++++++++++++++++++++++  NOTE: This report was transcribed using voice recognition software. Every effort was made to ensure accuracy; however, inadvertent computerized transcription errors may be present.

## 2022-09-23 NOTE — PROGRESS NOTES
TRAN PROGRESS NOTE      Chief complaint: Follow-up of left leg cellulitis    The patient is a 76 y.o. female with history of developmental delay, hypertension, hyperlipidemia, bilateral lower extremity lymphedema, lower extremity cellulitis seen by Dr. Elroy Conley in 2019, presented on 09/21 with bilateral leg pain accompanied by fever and malaise for 2-3 days, found to be septic with fever of 102.7 F, leukocytosis of 27,000, elevated procalcitonin of 0.33 ng/mL. CXR and RUQ US were unremarkable. Right hip xray showed extensive remodeling of right acetabulum and femoral osseous tip articulation likely related to degenerative arthropathy unchanged since 2019. Blood cultures showed no growth to date. Meropenem and vancomycin were started on admission. Subjective: Patient was seen and examined. No chills, no abdominal pain, no diarrhea, no rash, no itching. She wants to go back to the facility. Objective:    Vitals:    09/23/22 0821   BP: (!) 159/69   Pulse: 76   Resp: 20   Temp: 97.3 °F (36.3 °C)   SpO2: 94%     Constitutional: Alert, not in distress  Respiratory: Clear breath sounds, no crackles, no wheezes  Cardiovascular: Regular rate and rhythm, no murmurs  Gastrointestinal: Bowel sounds present, soft, nontender  Skin: Warm and dry, no active dermatoses  Musculoskeletal: Bilateral lower leg swollen. Left lower leg erythematous with skin excoriations and crusted blood on toes. Labs, imaging, and medical records/notes were personally reviewed. Assessment:  Sepsis, secondary to left leg cellulitis  Chronic bilateral lower extremity lymphedema  Allergy to multiple antibiotics    Recommendations:  Continue vancomycin dosed according to level per Pharmacy. Follow up blood cultures. Continue local wound care. Case was discussed with Dr. Krishna Louis. Thank you for involving me in the care of Cha Eisenberg. I will continue to follow.  Please do not hesitate to call for any questions or concerns.     Electronically signed by Love Peterson MD on 9/23/2022 at 10:10 AM

## 2022-09-24 ENCOUNTER — APPOINTMENT (OUTPATIENT)
Dept: GENERAL RADIOLOGY | Age: 77
DRG: 872 | End: 2022-09-24
Payer: MEDICARE

## 2022-09-24 VITALS
HEART RATE: 68 BPM | SYSTOLIC BLOOD PRESSURE: 190 MMHG | BODY MASS INDEX: 32.57 KG/M2 | TEMPERATURE: 96.7 F | WEIGHT: 177 LBS | RESPIRATION RATE: 20 BRPM | HEIGHT: 62 IN | OXYGEN SATURATION: 94 % | DIASTOLIC BLOOD PRESSURE: 86 MMHG

## 2022-09-24 LAB
ANION GAP SERPL CALCULATED.3IONS-SCNC: 9 MMOL/L (ref 7–16)
BUN BLDV-MCNC: 14 MG/DL (ref 6–23)
CALCIUM SERPL-MCNC: 8.4 MG/DL (ref 8.6–10.2)
CHLORIDE BLD-SCNC: 107 MMOL/L (ref 98–107)
CO2: 24 MMOL/L (ref 22–29)
CREAT SERPL-MCNC: 0.9 MG/DL (ref 0.5–1)
GFR AFRICAN AMERICAN: >60
GFR NON-AFRICAN AMERICAN: >60 ML/MIN/1.73
GLUCOSE BLD-MCNC: 126 MG/DL (ref 74–99)
POTASSIUM SERPL-SCNC: 4.1 MMOL/L (ref 3.5–5)
SARS-COV-2, NAAT: NOT DETECTED
SODIUM BLD-SCNC: 140 MMOL/L (ref 132–146)

## 2022-09-24 PROCEDURE — 6370000000 HC RX 637 (ALT 250 FOR IP): Performed by: INTERNAL MEDICINE

## 2022-09-24 PROCEDURE — 73630 X-RAY EXAM OF FOOT: CPT

## 2022-09-24 PROCEDURE — 87075 CULTR BACTERIA EXCEPT BLOOD: CPT

## 2022-09-24 PROCEDURE — 6360000002 HC RX W HCPCS: Performed by: INTERNAL MEDICINE

## 2022-09-24 PROCEDURE — 87070 CULTURE OTHR SPECIMN AEROBIC: CPT

## 2022-09-24 PROCEDURE — 36415 COLL VENOUS BLD VENIPUNCTURE: CPT

## 2022-09-24 PROCEDURE — 87635 SARS-COV-2 COVID-19 AMP PRB: CPT

## 2022-09-24 PROCEDURE — 87147 CULTURE TYPE IMMUNOLOGIC: CPT

## 2022-09-24 PROCEDURE — 2580000003 HC RX 258: Performed by: INTERNAL MEDICINE

## 2022-09-24 PROCEDURE — 80048 BASIC METABOLIC PNL TOTAL CA: CPT

## 2022-09-24 RX ORDER — HYDRALAZINE HYDROCHLORIDE 25 MG/1
25 TABLET, FILM COATED ORAL 2 TIMES DAILY
Qty: 90 TABLET | Refills: 0
Start: 2022-09-24

## 2022-09-24 RX ORDER — ATORVASTATIN CALCIUM 10 MG/1
10 TABLET, FILM COATED ORAL DAILY
Qty: 30 TABLET | Refills: 0
Start: 2022-09-25

## 2022-09-24 RX ADMIN — Medication 1 TABLET: at 08:53

## 2022-09-24 RX ADMIN — APIXABAN 5 MG: 5 TABLET, FILM COATED ORAL at 08:53

## 2022-09-24 RX ADMIN — SODIUM CHLORIDE: 9 INJECTION, SOLUTION INTRAVENOUS at 08:54

## 2022-09-24 RX ADMIN — MONTELUKAST 10 MG: 10 TABLET, FILM COATED ORAL at 20:14

## 2022-09-24 RX ADMIN — SENNOSIDES 17.2 MG: 8.6 TABLET, FILM COATED ORAL at 20:14

## 2022-09-24 RX ADMIN — PHENYTOIN SODIUM 400 MG: 100 CAPSULE ORAL at 20:14

## 2022-09-24 RX ADMIN — ARIPIPRAZOLE 5 MG: 5 TABLET ORAL at 20:14

## 2022-09-24 RX ADMIN — CARVEDILOL 25 MG: 25 TABLET, FILM COATED ORAL at 20:14

## 2022-09-24 RX ADMIN — HYDRALAZINE HYDROCHLORIDE 25 MG: 25 TABLET, FILM COATED ORAL at 20:14

## 2022-09-24 RX ADMIN — CARVEDILOL 25 MG: 25 TABLET, FILM COATED ORAL at 08:53

## 2022-09-24 RX ADMIN — PETROLATUM: 420 OINTMENT TOPICAL at 21:03

## 2022-09-24 RX ADMIN — HYDRALAZINE HYDROCHLORIDE 25 MG: 25 TABLET, FILM COATED ORAL at 08:53

## 2022-09-24 RX ADMIN — APIXABAN 5 MG: 5 TABLET, FILM COATED ORAL at 20:14

## 2022-09-24 RX ADMIN — FAMOTIDINE 20 MG: 20 TABLET, FILM COATED ORAL at 08:53

## 2022-09-24 RX ADMIN — ATORVASTATIN CALCIUM 10 MG: 10 TABLET, FILM COATED ORAL at 08:53

## 2022-09-24 RX ADMIN — DULOXETINE HYDROCHLORIDE 60 MG: 60 CAPSULE, DELAYED RELEASE ORAL at 08:53

## 2022-09-24 RX ADMIN — LEVOTHYROXINE SODIUM 188 MCG: 0.1 TABLET ORAL at 06:47

## 2022-09-24 RX ADMIN — VANCOMYCIN HYDROCHLORIDE 1000 MG: 1 INJECTION, POWDER, LYOPHILIZED, FOR SOLUTION INTRAVENOUS at 11:31

## 2022-09-24 ASSESSMENT — PAIN SCALES - GENERAL
PAINLEVEL_OUTOF10: 0

## 2022-09-24 NOTE — DISCHARGE SUMMARY
Hospitalist Discharge Summary    Patient ID: Annamaria Dai   Patient : 1945  Patient's PCP: Rosey Harry DO    Admit Date: 2022   Admitting Physician: Asuncion Garcia MD    Discharge Date:  2022   Discharge Physician: Sapna Davison MD   Discharge Condition: Stable  Discharge Disposition: [facility] (Non-Skilled)      Hospital course in brief:  (Please refer to daily progress notes for a comprehensive review of the hospitalization by requesting medical records)    76 y.o. female patient of Dr. Adelia Mary history of cellulitis of the legs, lymphedema, asthma, autism, history DVT on chronic apixaban who presents with left leg pain and swelling. This is been worsening for the last week. Patient notes increased pain associate with increased redness. Denies purulent drainage. Exacerbated by movement and touching the area. No relief. Patient states her swelling is actually better than it was last year. Pain and redness are worse now. In ED patient is found to have suspected sepsis secondary to cellulitis. She was febrile to 102.7. White count is elevated at 27.2. Patient is treated with meropenem and vancomycin. She is admitted for further evaluation and treatment. Seen by ID with meropenem discontinued, patient received vancomycin till  with improvement of her presentation. Wound in LLE was cleaned. ID reevaluated on  and agree for discharge with no need for further antibiotics. Patient was seen at bedside, endorses no complaints, she is happy that her leg is better and she is eager to return to the facility. Patient is stable for discharge     Exam:    General appearance: No apparent distress, appears stated age and cooperative. HEENT:  Normocephalic. Conjunctivae/corneas clear. Moist mucosa   Neck: Supple. No jugular venous distention. Thyroid not visible, non tender   Respiratory: Normal respiratory effort. Clear to auscultation bilaterally.  No stridor/wheezing/rhonchi/crackles   Cardiovascular: Regular heart beats, normal S1-S2. No M/G/R  Abdomen: Non distended, soft, non tender, no visceromegaly, no mass, normal bowel sounds   Musculoskeletal: No clubbing, cyanosis, no bilateral lower extremity edema. Brisk capillary refill. Skin:  left leg erythema. No calor. Neurologic: awake, alert and oriented x 3. Following commands. No focal neurological deficit. Cranial nerves 2-12 grossly normal        Consults:   IP CONSULT TO HOSPITALIST  IP CONSULT TO INFECTIOUS DISEASES  IP CONSULT TO PHARMACY  IP CONSULT TO PODIATRY    Discharge Diagnoses:    Cellulitis of left lower extremity. Treated  Lymphedema of both lower extremities   Autistic disorder   Chronic anticoagulation   History of DVT (deep vein thrombosis)      Discharge Instructions / Follow up:    No future appointments. Continued appropriate risk factor modification of blood pressure, diabetes and serum lipids will remain essential to reducing risk of future atherosclerotic development    Activity: activity as tolerated    Significant labs:  CBC:   No results for input(s): WBC, RBC, HGB, HCT, MCV, RDW, PLT in the last 72 hours. BMP:   Recent Labs     09/22/22  0621 09/24/22  0703    140   K 3.7 4.1    107   CO2 23 24   BUN 26* 14   CREATININE 1.1* 0.9     LFT:  No results for input(s): PROT, ALB, ALKPHOS, ALT, AST, BILITOT, AMYLASE, LIPASE in the last 72 hours. PT/INR: No results for input(s): INR, APTT in the last 72 hours. BNP: No results for input(s): BNP in the last 72 hours.   Hgb A1C: No results found for: LABA1C  Folate and B12: No results found for: [id number], No results found for: FOLATE  Thyroid Studies:   Lab Results   Component Value Date    TSH 0.177 (L) 09/22/2022       Urinalysis:    Lab Results   Component Value Date/Time    NITRU Negative 12/22/2020 05:28 PM    WBCUA 5-10 12/22/2020 05:28 PM    BACTERIA MANY 12/22/2020 05:28 PM    RBCUA 1-3 12/22/2020 05:28 PM BLOODU SMALL 12/22/2020 05:28 PM    SPECGRAV 1.015 12/22/2020 05:28 PM    GLUCOSEU Negative 12/22/2020 05:28 PM       Imaging:  XR HIP RIGHT (2-3 VIEWS)    Result Date: 9/22/2022  EXAMINATION: TWO XRAY VIEWS OF THE RIGHT HIP 9/21/2022 2:38 pm COMPARISON: 2019 9/20 HISTORY: ORDERING SYSTEM PROVIDED HISTORY: hip pain TECHNOLOGIST PROVIDED HISTORY: Reason for exam:->hip pain FINDINGS: There is extensive remodeling of the right acetabulum and right femoroacetabular articulation. There is a suboptimal penetration is difficult to exclude a subtle fracture. No soft tissue air formation. Limited examination due to underpenetration. Extensive remodeling of the right acetabulum and right femoral osseous tip articulation, unchanged 2019 likely related to degenerative arthropathy, if there is concern for an occult fracture consider further evaluation with CT.     XR FOOT LEFT (MIN 3 VIEWS)    Result Date: 9/24/2022  EXAMINATION: THREE XRAY VIEWS OF THE LEFT FOOT 9/24/2022 10:48 am COMPARISON: The previous study performed 12/05/2019. HISTORY: ORDERING SYSTEM PROVIDED HISTORY: Wound TECHNOLOGIST PROVIDED HISTORY: Reason for exam:->Wound What reading provider will be dictating this exam?->CRC FINDINGS: The patient is again status post ORIF of the distal tibia and fibula. The bones and orthopedic hardware are unchanged in alignment and position. No abnormal radiolucencies are identified at the bone/orthopedic hardware interfaces. Diffuse osteopenia is again noted. There is no evidence of fracture or dislocation. Multiple hammertoe deformities are again present. Osteo-degenerative changes are again noted involving the 1st MTP joint and midfoot. A mild pes cavus is again present. A mild hallux valgus deformity is again seen. Tiny erosive changes are again noted involving the head of the 5th metatarsal bone. No other significant osseous abnormality is seen.  There is a questionable, small wound/ulcer along the dorsal atorvastatin 10 MG tablet  Commonly known as: LIPITOR  Take 1 tablet by mouth daily  Start taking on: September 25, 2022  Replaces: simvastatin 20 MG tablet            CHANGE how you take these medications      hydrALAZINE 25 MG tablet  Commonly known as: APRESOLINE  Take 1 tablet by mouth 2 times daily  What changed:   medication strength  how much to take     levothyroxine 100 MCG tablet  Commonly known as: SYNTHROID  What changed: Another medication with the same name was removed. Continue taking this medication, and follow the directions you see here. CONTINUE taking these medications      albuterol (2.5 MG/3ML) 0.083% nebulizer solution  Commonly known as: PROVENTIL     ammonium lactate 12 % cream  Commonly known as: AMLACTIN     apixaban 5 MG Tabs tablet  Commonly known as: ELIQUIS     ARIPiprazole 2 MG tablet  Commonly known as: ABILIFY     bumetanide 1 MG tablet  Commonly known as: BUMEX     camphor-menthol 0.5-0.5 % lotion  Commonly known as: SARNA     carvedilol 25 MG tablet  Commonly known as: COREG     DULoxetine 60 MG extended release capsule  Commonly known as: CYMBALTA     famotidine 20 MG tablet  Commonly known as: PEPCID     fluocinonide 0.05 % cream  Commonly known as: LIDEX     levETIRAcetam 750 MG tablet  Commonly known as: KEPPRA     magnesium hydroxide 400 MG/5ML suspension  Commonly known as: MILK OF MAGNESIA     montelukast 10 MG tablet  Commonly known as: SINGULAIR     oxybutynin 5 MG tablet  Commonly known as: DITROPAN     Oyster Shell Calcium/D 500-400 MG-UNIT Tabs     * phenytoin 100 MG ER capsule  Commonly known as: DILANTIN     * phenytoin 100 MG ER capsule  Commonly known as: DILANTIN     senna 8.6 MG tablet  Commonly known as: SENOKOT     therapeutic multivitamin-minerals tablet           * This list has 2 medication(s) that are the same as other medications prescribed for you.  Read the directions carefully, and ask your doctor or other care provider to review them with

## 2022-09-24 NOTE — PROGRESS NOTES
Pharmacy Consultation Note  (Antibiotic Dosing and Monitoring)    Initial consult date: 9/22/22  Consulting physician/provider: Dr Cem Brenner  Drug: Vancomycin  Indication: SSTI    Age/  Gender Height Weight IBW  Allergy Information   77 y.o./female 5' 2\" (157.5 cm) 177 lb (80.3 kg)     Ideal body weight: 50.1 kg (110 lb 7.2 oz)  Adjusted ideal body weight: 62.2 kg (137 lb 1.1 oz)   Latex, Cephalosporins, Clindamycin/lincomycin, Duoderm hydroactive [hydroactive dressings], Pcn [penicillins], and Tape [adhesive tape]      Renal Function:  Recent Labs     09/22/22  0621 09/24/22  0703   BUN 26* 14   CREATININE 1.1* 0.9         Intake/Output Summary (Last 24 hours) at 9/24/2022 1017  Last data filed at 9/24/2022 1009  Gross per 24 hour   Intake 1960 ml   Output --   Net 1960 ml         Vancomycin Monitoring:  Trough:  No results for input(s): VANCOTROUGH in the last 72 hours. Random:    Recent Labs     09/23/22  0647   VANCORANDOM 15.9         No results for input(s): Jaelyn Ivanoff in the last 72 hours. Historical Cultures:  Organism   Date Value Ref Range Status   09/20/2019 Pseudomonas aeruginosa (A)  Final     No results for input(s): BC in the last 72 hours. Vancomycin Administration Times:  Recent vancomycin administrations                     vancomycin (VANCOCIN) 1,000 mg in dextrose 5 % 250 mL IVPB ([id number]) (mg) 1,000 mg New Bag 09/23/22 1132     1,000 mg New Bag 09/22/22 1817    vancomycin 1500 mg in dextrose 5% 300 mL IVPB (mg) 1,500 mg New Bag 09/21/22 1612                  Assessment:  Patient is a 76 y.o. female who has been initiated on vancomycin  Estimated Creatinine Clearance: 50 mL/min (based on SCr of 0.9 mg/dL). To dose vancomycin, pharmacy will be utilizing MemoryMerge calculation software for goal AUC/SHER 400-600 mg/L-hr  9/23: Random vanco level = 15.9 mcg/ml (~12.5 hours post dose). Give next dose now (adjusted timing).    9/24: SCr improved to 0.9     Plan:  Continue vancomycin 1000 mg IV every 24 hours (estimated , trough 14.0)  Will check vancomycin levels when appropriate. Will continue to monitor renal function   Clinical pharmacy to follow.     Leidy NogueiraD, BCPS, BCCCP  9/24/2022  10:18 AM

## 2022-09-24 NOTE — DISCHARGE INSTR - COC
Continuity of Care Form    Patient Name: Winston Acosta   :  1945  MRN:  [id number]    Admit date:  2022  Discharge date:  2022    Code Status Order: Full Code   Advance Directives:     Admitting Physician:  Jethro Peter MD  PCP: Alondra Thurman DO    Discharging Nurse: [facility] Unit/Room#: 9120/1368-U  Discharging Unit Phone Number: [phone]    Emergency Contact:   Extended Emergency Contact Information  Primary Emergency Contact: Shawna [address] Phone: [phone]  Mobile Phone: [phone]  Relation: Other Relative    Past Surgical History:  Past Surgical History:   Procedure Laterality Date    [place] Left 2018    LEFT EYE CATARACT EXTRACTION IOL IMPLANT  ++LATEX ALLERGY++ performed by Mary Ann MD at [facility] 65 RMVL INSJ IO LENS PROSTH W/O ECP Right 10/12/2018    RIGHT EYE CATARACT EXTRACTION IOL IMPLANT  ++LATEX ALLERGY++ performed by Mary Ann MD at [facility] 50  age 8       Immunization History:   Immunization History   Administered Date(s) Administered    Tdap (Boostrix, Adacel) 2020       Active Problems:  Patient Active Problem List   Diagnosis Code    Seizures ([facility] R56.9    Lymphedema of both lower extremities I89.0    Leg swelling M79.89    Cellulitis of left lower extremity L03. 116    Dermatophytosis B35.9    Pain of both hip joints M25.551, M25.552    Autistic disorder F84.0    Anemia D64.9    Chronic anticoagulation Z79.01    History of DVT (deep vein thrombosis) Z86.718    Obesity (BMI 30-39. 9) E66.9    Sepsis ([facility] A41.9       Isolation/Infection:   Isolation            No Isolation          Patient Infection Status       None to display            Nurse Assessment:  Last Vital Signs: BP (!) 150/64   Pulse 71   Temp 97.6 °F (36.4 °C) (Skin)   Resp 19   Ht 5' 2\" (1.575 m)   Wt 177 lb (80.3 kg)   SpO2 93%   BMI 32.37 kg/m²     Last documented pain score (0-10 scale): Pain Level: 0  Last Weight:   Wt Readings from Last 1 Encounters:   09/21/22 177 lb (80.3 kg)     Mental Status:  oriented, alert, and coherent    IV Access:  - None    Nursing Mobility/ADLs:  Walking   Dependent  Transfer  Assisted  Bathing  Assisted  Dressing  Dependent  Toileting  Assisted  Feeding  Independent  Med Admin  Assisted  Med Delivery   none    Wound Care Documentation and Therapy:  Wound 12/05/19 Pretibial Left (Active)   Number of days: 1024        Elimination:  Continence: Bowel: Yes  Bladder: Yes  Urinary Catheter: None   Colostomy/Ileostomy/Ileal Conduit: No       Date of Last BM: ***    Intake/Output Summary (Last 24 hours) at 9/24/2022 1437  Last data filed at 9/24/2022 1412  Gross per 24 hour   Intake 2410 ml   Output --   Net 2410 ml     I/O last 3 completed shifts: In: 1960 [P.O.:960; I.V.:750; IV Piggyback:250]  Out: -     Safety Concerns: At Risk for Falls    Impairments/Disabilities:      None    Nutrition Therapy:  Current Nutrition Therapy:   - Oral Diet:  General    Routes of Feeding: Oral  Liquids: No Restrictions  Daily Fluid Restriction: no  Last Modified Barium Swallow with Video (Video Swallowing Test): not done    Treatments at the Time of Hospital Discharge:   Respiratory Treatments: none  Oxygen Therapy:  is not on home oxygen therapy. Ventilator:    - No ventilator support    Rehab Therapies: Physical Therapy and Occupational Therapy  Weight Bearing Status/Restrictions: No weight bearing restrictions  Other Medical Equipment (for information only, NOT a DME order):  wheelchair and walker  Other Treatments: n/a    Patient's personal belongings (please select all that are sent with patient):   All belongings sent with patient at discharge    RN SIGNATURE:  Electronically signed by Sherri Rodriguez RN on 9/24/22 at 2:42 PM EDT    CASE MANAGEMENT/SOCIAL WORK SECTION    Inpatient Status Date: ***    Readmission Risk Assessment Score:  Readmission Risk              Risk of Unplanned Readmission:  19           Discharging to Facility/ Agency   Name:   Address:  Phone:  Fax:    Dialysis Facility (if applicable)   Name:  Address:  Dialysis Schedule:  Phone:  Fax:    / signature: {Esignature:[id number]}    PHYSICIAN SECTION    Prognosis: {Prognosis:6251558154}    Condition at Discharge: Nubia Emmanuel Patient Condition:[id number]}    Rehab Potential (if transferring to Rehab): {Prognosis:7457505897}    Recommended Labs or Other Treatments After Discharge: ***    Physician Certification: I certify the above information and transfer of Winston Acosta  is necessary for the continuing treatment of the diagnosis listed and that she requires {Admit to Appropriate Level of Care:10278} for {GREATER/LESS:296555428} 30 days.      Update Admission H&P: {CHP DME Changes in ZLVTD:277319179}    PHYSICIAN SIGNATURE:  {Esignature:[id number]}

## 2022-09-24 NOTE — CONSULTS
Department of Podiatry   Consult Note        Reason for Consult:  Dorsal foot wound    CHIEF COMPLAINT:  none    HISTORY OF PRESENT ILLNESS:                 Ms. Mallory Mockster is a 76 y.o. female with significant past medical history of B/L lymphedema, DVT, Autistic disorder, and seizures. Patient is pleasant, alert, awake and responsive. Patient presented to the ED 3 days ago from [facility] for B/L LE cellulitis with elevated WBC and fever of 102.7. Pt on Vanco per ID. Increased redness and swelling noticed for about 1 week. Podiatry consulted today for left dorsal foot wound. States wound care nurse at the facility took really good care of her wounds. On examination,  there are no wound to dorsal foot. Dorsal foot is dry with crusty, flaky superficial skin layer. Patient denies any N/V/D/F/C/SOB/CP at the time of visit. D/C planning. No other pedal complaints at this time.      Past Medical History:        Diagnosis Date    Asthma     Autistic disorder, residual state     Cellulitis of left leg 8/7/2017    Chronic major depressive disorder, recurrent episode ([address]     Dermatophytosis 8/7/2017    Hx of blood clots     Hyperlipidemia     Hypertension     Hypothyroidism     Lower limb ulcer, calf, left, with fat layer exposed ([address] 1/20/2020    Lymphedema of both lower extremities 8/7/2017    Mental retardation     mild    Obesity     Osteoarthritis     Seizures ([address]     none recent as of 9/18       Past Surgical History:        Procedure Laterality Date    APPENDECTOMY  1969    NM CORNEAL TRANSPLANT,ENDOTHELIAL Left 9/14/2018    LEFT EYE CATARACT EXTRACTION IOL IMPLANT  ++LATEX ALLERGY++ performed by Keith Garrett MD at [address] [place] [address] W/O ECP Right 10/12/2018    RIGHT EYE CATARACT EXTRACTION IOL IMPLANT  ++LATEX ALLERGY++ performed by Keith Garrett MD at [address] [place] [address]  age 4       Medications Prior to Admission:    Medications Prior to Admission: albuterol (PROVENTIL) (2.5 MG/3ML) 0.083% nebulizer solution, Take 2.5 mg by nebulization every 4 hours as needed for Wheezing  ammonium lactate (AMLACTIN) 12 % cream, Apply topically as needed for Dry Skin  bumetanide (BUMEX) 1 MG tablet, Take 2 mg by mouth daily  phenytoin (DILANTIN) 100 MG ER capsule, Take 100 mg by mouth daily  phenytoin (DILANTIN) 100 MG ER capsule, Take 200 mg by mouth at bedtime  levETIRAcetam (KEPPRA) 750 MG tablet, Take 750 mg by mouth 2 times daily  magnesium hydroxide (MILK OF MAGNESIA) 400 MG/5ML suspension, Take 30 mLs by mouth every 72 hours as needed for Constipation  Calcium Carb-Cholecalciferol (OYSTER SHELL CALCIUM/D) 500-400 MG-UNIT TABS, Take 1 tablet by mouth in the morning and at bedtime  [DISCONTINUED] diphenhydrAMINE (BENADRYL) 25 MG capsule, Take 25 mg by mouth every 6 hours as needed for Itching  [DISCONTINUED] miconazole (MICOTIN) 2 % cream, Apply topically 2 times daily Apply to abdominal folds  [DISCONTINUED] oxyCODONE-acetaminophen (PERCOCET) 5-325 MG per tablet, Take 1 tablet by mouth every 4 hours as needed for Pain. [DISCONTINUED] oxyCODONE-acetaminophen (PERCOCET) 5-325 MG per tablet, Take 1 tablet by mouth every 12 hours. apixaban (ELIQUIS) 5 MG TABS tablet, Take 5 mg by mouth 2 times daily  camphor-menthol (SARNA) 0.5-0.5 % lotion, Apply topically as needed for Itching (apply to lower extremities) Apply topically as needed.   [DISCONTINUED] hydrALAZINE (APRESOLINE) 50 MG tablet, Take 75 mg by mouth 2 times daily  DULoxetine (CYMBALTA) 60 MG extended release capsule, Take 60 mg by mouth daily   oxybutynin (DITROPAN) 5 MG tablet, Take 2.5 mg by mouth 2 times daily  levothyroxine (SYNTHROID) 100 MCG tablet, Take 100 mcg by mouth Daily For a total of 188 mcg  famotidine (PEPCID) 20 MG tablet, Take 20 mg by mouth daily   ARIPiprazole (ABILIFY) 2 MG tablet, Take 2 mg by mouth nightly  carvedilol (COREG) 25 MG tablet, Take 25 mg by mouth 2 times daily   fluocinonide (LIDEX) 0.05 % cream, Apply topically daily as needed (apply to both ears for dry skin)  senna (SENOKOT) 8.6 MG tablet, Take 2 tablets by mouth nightly   montelukast (SINGULAIR) 10 MG tablet, Take 10 mg by mouth nightly  Multiple Vitamins-Minerals (THERAPEUTIC MULTIVITAMIN-MINERALS) tablet, Take 1 tablet by mouth daily Ld 10/10/2018  [DISCONTINUED] levothyroxine (SYNTHROID) 88 MCG tablet, Take 88 mcg by mouth Daily For a total of 188 mcg    Allergies:  Latex, Cephalosporins, Clindamycin/lincomycin, Duoderm hydroactive [hydroactive dressings], Pcn [penicillins], and Tape [adhesive tape]    Social History:   TOBACCO:   reports that she has never smoked. She has never used smokeless tobacco.  ETOH:   reports no history of alcohol use. DRUGS:   Social History     Substance and Sexual Activity   Drug Use No       Family History:   History reviewed. No pertinent family history. REVIEW OF SYSTEMS:    All pertinent positives and negatives as noted in HPI       LOWER EXTREMITY EXAMINATION     VASCULAR:  DP and PT pulses are non-palpable. CFT < 5 seconds B/L. Warm to warm from the tibial tuberosity to the distal aspect of the digits dorsally. No hair growth noted to the distal aspects dorsally. NEUROLOGIC:  Protective sensation intact B/L    DERM:  Left foot with no open wound. No crepitus, fluctuance, drainage or malodor. Dorsal foot is xerotic with some thin layer of hyperkeratotic tissue.    B/L LE warmth, edema and erythema noted, L > R.     MUSCULOSKELETAL:  no pain on palpation to B/L LE.                 CONSULTS:  IP CONSULT TO HOSPITALIST  IP CONSULT TO INFECTIOUS DISEASES  IP CONSULT TO PHARMACY  IP CONSULT TO PODIATRY    MEDICATION:  Scheduled Meds:   vancomycin  1,000 mg IntraVENous Q24H    apixaban  5 mg Oral BID    ARIPiprazole  5 mg Oral Nightly    carvedilol  25 mg Oral BID    DULoxetine  60 mg Oral Daily    famotidine  20 mg Oral Daily    hydrALAZINE  25 mg Oral BID    levothyroxine  188 mcg Oral Daily montelukast  10 mg Oral Nightly    therapeutic multivitamin-minerals  1 tablet Oral Daily    phenytoin  400 mg Oral Nightly    senna  2 tablet Oral Nightly    atorvastatin  10 mg Oral Daily    sodium chloride flush  10 mL IntraVENous 2 times per day     Continuous Infusions:   sodium chloride Stopped (09/24/22 1436)    sodium chloride Stopped (09/24/22 1412)     PRN Meds:.ipratropium-albuterol, oxyCODONE-acetaminophen, sodium chloride flush, sodium chloride, ondansetron **OR** ondansetron, magnesium hydroxide, acetaminophen **OR** acetaminophen    RADIOLOGY:  XR FOOT LEFT (MIN 3 VIEWS)   Final Result   No fracture or dislocation. No significant interval change in the osseous   structures, when compared to the previous study performed 12/05/2019.   questionable small wound/ulcer along the dorsal foot, as described above and   only suggested on the lateral view. XR HIP RIGHT (2-3 VIEWS)   Final Result   Limited examination due to underpenetration. Extensive remodeling of the right acetabulum and right femoral osseous tip   articulation, unchanged 2019 likely related to degenerative arthropathy, if   there is concern for an occult fracture consider further evaluation with CT.         US ABDOMEN LIMITED   Final Result   Unremarkable right upper quadrant ultrasound. XR CHEST PORTABLE   Final Result   No acute process. XR TIBIA FIBULA RIGHT (2 VIEWS)    (Results Pending)   XR TIBIA FIBULA LEFT (2 VIEWS)    (Results Pending)   XR HIP LEFT (2-3 VIEWS)    (Results Pending)   US DUP LOWER EXTREMITIES BILATERAL VENOUS    (Results Pending)   VL LOWER EXTREMITY ARTERIAL SEGMENTAL PRESSURES W PPG BILATERAL    (Results Pending)       Vitals:    BP (!) 153/67   Pulse 71   Temp 97.4 °F (36.3 °C)   Resp 21   Ht 5' 2\" (1.575 m)   Wt 177 lb (80.3 kg)   SpO2 97%   BMI 32.37 kg/m²     LABS:   No results for input(s): WBC, HGB, HCT, PLT in the last 72 hours.   Recent Labs     09/24/22  0703      K 4.1      CO2 24   BUN 14   CREATININE 0.9     No results for input(s): PROT, INR, APTT in the last 72 hours. ASSESSMENTS:   - Cellulitis, B/L LE  - Lymphedema, B/L LE    Principal Problem:    Sepsis ([address]  Active Problems:    Seizures (HCC)    Lymphedema of both lower extremities    Cellulitis of left lower extremity    Autistic disorder    Chronic anticoagulation    History of DVT (deep vein thrombosis)      PLAN:    - Patient was examined and evaluated. Lab,images and ancillary service notes reviewed. - No new labs  - ID following: vanco  - Negative Xrays    - NIVS pending   - No plan for surgical intervention. Continue conservative care to B/L LE  - Dressings consists of xeroform/dsd. QoD changes  - [facility]  - Discussed patient with Dr. Juan Luis Messina    Thank you for the opportunity to take part in the patient's care. Please do not hesitate to call for any questions or concerns.     Elysia Brambila DPM  PGY2  9/24/2022  3:19 PM

## 2022-09-24 NOTE — PROGRESS NOTES
Report called to [address] at [facility], questions answered,  time scheduled for 18:30 this evening. Faxed AVS/Discharge instructions to [facility] as requested by Fe Cruz, hard copy sent with discharge papers.

## 2022-09-24 NOTE — CARE COORDINATION
9/24/2022 sw notified of discharge. Lizeth set up pas abulette for return to [facility] number [id number]. Sw notified floor and facility liaison. Rn to notify family and complete n/n.  Lizeth will tube ambulette form to #[phone]  Electronically signed by ARTEMIO Ramos on 9/24/2022 at 2:48 PM

## 2022-09-24 NOTE — PROGRESS NOTES
Hospitalist Progress Note      SYNOPSIS: Patient admitted on 2022     Patient is a 76 y.o. female patient of Dr. Robert Cortez history of cellulitis of the legs, lymphedema, asthma, autism, history DVT on chronic apixaban who presents with left leg pain and swelling. This is been worsening for the last week. Patient notes increased pain associate with increased redness. Denies purulent drainage. Exacerbated by movement and touching the area. No relief. Patient states her swelling is actually better than it was last year. Pain and redness are worse now. In ED patient is found to have suspected sepsis secondary to cellulitis. She was febrile to 102.7. White count is elevated at 27.2. Patient is treated with meropenem and vancomycin. She is admitted for further evaluation and treatment      SUBJECTIVE:    Patient endorses no new complaints and is eager to leave the hospital  Denies pain, sob, n/v, chills   Records reviewed. Stable overnight. No overnight issues reported    Temp (24hrs), Av.5 °F (36.4 °C), Min:96.8 °F (36 °C), Max:98 °F (36.7 °C)    DIET: ADULT DIET; Regular  CODE: Full Code    Intake/Output Summary (Last 24 hours) at 2022 1321  Last data filed at 2022 1009  Gross per 24 hour   Intake 1470 ml   Output --   Net 1470 ml       OBJECTIVE:    BP (!) 150/64   Pulse 71   Temp 97.6 °F (36.4 °C) (Skin)   Resp 19   Ht 5' 2\" (1.575 m)   Wt 177 lb (80.3 kg)   SpO2 93%   BMI 32.37 kg/m²     General appearance: No apparent distress, appears stated age and cooperative. HEENT:  Normocephalic. Conjunctivae/corneas clear. Moist mucosa   Neck: Supple. No jugular venous distention. Thyroid not visible, non tender   Respiratory: Normal respiratory effort. Clear to auscultation bilaterally. No stridor/wheezing/rhonchi/crackles   Cardiovascular: Regular heart beats, normal S1-S2.  No M/G/R  Abdomen: Non distended, soft, non tender, no visceromegaly, no mass, normal bowel sounds Musculoskeletal: No clubbing, cyanosis, no bilateral lower extremity edema. Brisk capillary refill. Skin:  Left leg calor and rubor from foot to above knee. Crusted skin break on dorsal left foot   Neurologic: awake, alert and oriented x 3. Following commands. No focal neurological deficit. Cranial nerves 2-12 grossly normal      ASSESSMENT:    Lymphedema of both lower extremities  Cellulitis of left lower extremity   Autistic disorder   Chronic anticoagulation   History of DVT (deep vein thrombosis)         PLAN:    Continue IV abx with just vanco. DC meropenem. ID following  Follow up cultures. BC no grow since 9/21    Wound care is still pending. Consult request placed again. Monitor labs closely  Her home meds have been resumed   PT/OT  DVT PPx  DC planning           DVT prophylaxis eliquis     DISPOSITION:   Medications:  REVIEWED DAILY    Infusion Medications    sodium chloride 75 mL/hr at 09/24/22 0854    sodium chloride       Scheduled Medications    vancomycin  1,000 mg IntraVENous Q24H    apixaban  5 mg Oral BID    ARIPiprazole  5 mg Oral Nightly    carvedilol  25 mg Oral BID    DULoxetine  60 mg Oral Daily    famotidine  20 mg Oral Daily    hydrALAZINE  25 mg Oral BID    levothyroxine  188 mcg Oral Daily    montelukast  10 mg Oral Nightly    therapeutic multivitamin-minerals  1 tablet Oral Daily    phenytoin  400 mg Oral Nightly    senna  2 tablet Oral Nightly    atorvastatin  10 mg Oral Daily    sodium chloride flush  10 mL IntraVENous 2 times per day     PRN Meds: ipratropium-albuterol, oxyCODONE-acetaminophen, sodium chloride flush, sodium chloride, ondansetron **OR** ondansetron, magnesium hydroxide, acetaminophen **OR** acetaminophen    Labs:     No results for input(s): WBC, HGB, HCT, PLT in the last 72 hours.       Recent Labs     09/22/22  0621 09/24/22  0703    140   K 3.7 4.1    107   CO2 23 24   BUN 26* 14   CREATININE 1.1* 0.9   CALCIUM 8.3* 8.4*       No results for input(s): PROT, ALB, ALKPHOS, ALT, AST, BILITOT, AMYLASE, LIPASE in the last 72 hours. No results for input(s): INR in the last 72 hours. No results for input(s): Zwingle Jennifer in the last 72 hours. Chronic labs:    Lab Results   Component Value Date    TSH 0.177 (L) 09/22/2022       Radiology: REVIEWED DAILY    +++++++++++++++++++++++++++++++++++++++++++++++++  Radha Pedroza MD  [facility] - [address] [place] [address]  +++++++++++++++++++++++++++++++++++++++++++++++++  NOTE: This report was transcribed using voice recognition software. Every effort was made to ensure accuracy; however, inadvertent computerized transcription errors may be present.

## 2022-09-25 NOTE — PROGRESS NOTES
Physicians transport picked up patient with belongings, iv and tele removed. belongings sent with patient.

## 2022-09-26 LAB
ANAEROBIC CULTURE: NORMAL
BLOOD CULTURE, ROUTINE: NORMAL
CULTURE, BLOOD 2: NORMAL
WOUND/ABSCESS: NORMAL

## 2022-12-09 ENCOUNTER — HOSPITAL ENCOUNTER (INPATIENT)
Age: 77
LOS: 5 days | Discharge: HOME | DRG: 872 | End: 2022-12-14
Attending: EMERGENCY MEDICINE | Admitting: INTERNAL MEDICINE
Payer: MEDICARE

## 2022-12-09 ENCOUNTER — APPOINTMENT (OUTPATIENT)
Dept: GENERAL RADIOLOGY | Age: 77
DRG: 872 | End: 2022-12-09
Payer: MEDICARE

## 2022-12-09 DIAGNOSIS — L03.116 CELLULITIS OF LEFT LOWER EXTREMITY: Primary | ICD-10-CM

## 2022-12-09 DIAGNOSIS — G40.909 SEIZURE DISORDER (HCC): ICD-10-CM

## 2022-12-09 LAB
ALBUMIN SERPL-MCNC: 3.4 G/DL (ref 3.5–5.2)
ALP BLD-CCNC: 126 U/L (ref 35–104)
ALT SERPL-CCNC: 25 U/L (ref 0–32)
ANION GAP SERPL CALCULATED.3IONS-SCNC: 15 MMOL/L (ref 7–16)
AST SERPL-CCNC: 48 U/L (ref 0–31)
BASOPHILS ABSOLUTE: 0 E9/L (ref 0–0.2)
BASOPHILS RELATIVE PERCENT: 0.1 % (ref 0–2)
BILIRUB SERPL-MCNC: 0.4 MG/DL (ref 0–1.2)
BUN BLDV-MCNC: 33 MG/DL (ref 6–23)
CALCIUM SERPL-MCNC: 9.1 MG/DL (ref 8.6–10.2)
CHLORIDE BLD-SCNC: 102 MMOL/L (ref 98–107)
CO2: 23 MMOL/L (ref 22–29)
CREAT SERPL-MCNC: 1.3 MG/DL (ref 0.5–1)
EOSINOPHILS ABSOLUTE: 0 E9/L (ref 0.05–0.5)
EOSINOPHILS RELATIVE PERCENT: 0 % (ref 0–6)
GFR SERPL CREATININE-BSD FRML MDRD: 42 ML/MIN/1.73
GLUCOSE BLD-MCNC: 125 MG/DL (ref 74–99)
HCT VFR BLD CALC: 32.5 % (ref 34–48)
HEMOGLOBIN: 10.4 G/DL (ref 11.5–15.5)
INFLUENZA A BY PCR: NOT DETECTED
INFLUENZA B BY PCR: NOT DETECTED
LACTIC ACID, SEPSIS: 2.3 MMOL/L (ref 0.5–1.9)
LYMPHOCYTES ABSOLUTE: 0.54 E9/L (ref 1.5–4)
LYMPHOCYTES RELATIVE PERCENT: 1.8 % (ref 20–42)
MCH RBC QN AUTO: 31.1 PG (ref 26–35)
MCHC RBC AUTO-ENTMCNC: 32 % (ref 32–34.5)
MCV RBC AUTO: 97.3 FL (ref 80–99.9)
METAMYELOCYTES RELATIVE PERCENT: 1.7 % (ref 0–1)
MONOCYTES ABSOLUTE: 0 E9/L (ref 0.1–0.95)
MONOCYTES RELATIVE PERCENT: 1.1 % (ref 2–12)
NEUTROPHILS ABSOLUTE: 26.36 E9/L (ref 1.8–7.3)
NEUTROPHILS RELATIVE PERCENT: 96.5 % (ref 43–80)
OVALOCYTES: ABNORMAL
PDW BLD-RTO: 14.3 FL (ref 11.5–15)
PHENYTOIN DOSE AMOUNT: NORMAL
PHENYTOIN LEVEL: 11.7 UG/ML (ref 10–20)
PLATELET # BLD: 271 E9/L (ref 130–450)
PMV BLD AUTO: 9.4 FL (ref 7–12)
POIKILOCYTES: ABNORMAL
POTASSIUM SERPL-SCNC: 4.7 MMOL/L (ref 3.5–5)
PRO-BNP: 3524 PG/ML (ref 0–450)
RBC # BLD: 3.34 E12/L (ref 3.5–5.5)
SARS-COV-2, NAAT: NOT DETECTED
SODIUM BLD-SCNC: 140 MMOL/L (ref 132–146)
STOMATOCYTES: ABNORMAL
TOTAL PROTEIN: 8 G/DL (ref 6.4–8.3)
TOXIC GRANULATION: ABNORMAL
TROPONIN, HIGH SENSITIVITY: 39 NG/L (ref 0–9)
VACUOLATED NEUTROPHILS: ABNORMAL
WBC # BLD: 26.9 E9/L (ref 4.5–11.5)

## 2022-12-09 PROCEDURE — 99285 EMERGENCY DEPT VISIT HI MDM: CPT

## 2022-12-09 PROCEDURE — 2580000003 HC RX 258: Performed by: STUDENT IN AN ORGANIZED HEALTH CARE EDUCATION/TRAINING PROGRAM

## 2022-12-09 PROCEDURE — 80053 COMPREHEN METABOLIC PANEL: CPT

## 2022-12-09 PROCEDURE — 36415 COLL VENOUS BLD VENIPUNCTURE: CPT

## 2022-12-09 PROCEDURE — 2580000003 HC RX 258: Performed by: EMERGENCY MEDICINE

## 2022-12-09 PROCEDURE — 85025 COMPLETE CBC W/AUTO DIFF WBC: CPT

## 2022-12-09 PROCEDURE — 6370000000 HC RX 637 (ALT 250 FOR IP): Performed by: EMERGENCY MEDICINE

## 2022-12-09 PROCEDURE — 6360000002 HC RX W HCPCS: Performed by: STUDENT IN AN ORGANIZED HEALTH CARE EDUCATION/TRAINING PROGRAM

## 2022-12-09 PROCEDURE — 83605 ASSAY OF LACTIC ACID: CPT

## 2022-12-09 PROCEDURE — 93005 ELECTROCARDIOGRAM TRACING: CPT | Performed by: EMERGENCY MEDICINE

## 2022-12-09 PROCEDURE — 80185 ASSAY OF PHENYTOIN TOTAL: CPT

## 2022-12-09 PROCEDURE — 83880 ASSAY OF NATRIURETIC PEPTIDE: CPT

## 2022-12-09 PROCEDURE — 2140000000 HC CCU INTERMEDIATE R&B

## 2022-12-09 PROCEDURE — 87186 SC STD MICRODIL/AGAR DIL: CPT

## 2022-12-09 PROCEDURE — 71045 X-RAY EXAM CHEST 1 VIEW: CPT

## 2022-12-09 PROCEDURE — 84484 ASSAY OF TROPONIN QUANT: CPT

## 2022-12-09 PROCEDURE — 87502 INFLUENZA DNA AMP PROBE: CPT

## 2022-12-09 PROCEDURE — 87635 SARS-COV-2 COVID-19 AMP PRB: CPT

## 2022-12-09 PROCEDURE — 87040 BLOOD CULTURE FOR BACTERIA: CPT

## 2022-12-09 RX ORDER — SODIUM CHLORIDE 9 MG/ML
INJECTION, SOLUTION INTRAVENOUS CONTINUOUS
Status: DISCONTINUED | OUTPATIENT
Start: 2022-12-09 | End: 2022-12-13

## 2022-12-09 RX ORDER — ACETAMINOPHEN 325 MG/1
650 TABLET ORAL ONCE
Status: COMPLETED | OUTPATIENT
Start: 2022-12-09 | End: 2022-12-09

## 2022-12-09 RX ADMIN — SODIUM CHLORIDE: 9 INJECTION, SOLUTION INTRAVENOUS at 21:39

## 2022-12-09 RX ADMIN — MEROPENEM 1000 MG: 1 INJECTION, POWDER, FOR SOLUTION INTRAVENOUS at 23:57

## 2022-12-09 RX ADMIN — ACETAMINOPHEN 650 MG: 325 TABLET ORAL at 21:40

## 2022-12-09 NOTE — Clinical Note
Discharge Plan[de-identified] Other/[facility])   Telemetry/Cardiac Monitoring Required?: Yes

## 2022-12-09 NOTE — LETTER
[place] Department Medicaid  CERTIFICATION OF NECESSITY  FOR NON-EMERGENCY TRANSPORTATION   BY GROUND AMBULANCE      Individual Information   1. Name: Scar Eye 2. [place] Medicaid Billing Number:    3. Address: [facility]  [address]      Transportation Provider Information   4. Provider Name:    5. [place] Medicaid Provider Number:  National Provider Identifier (NPI):      Certification  7. Criteria:  During transport, this individual requires:  [x] Medical treatment or continuous     supervision by an EMT. [] The administration or regulation of oxygen by another person. [] Supervised protective restraint. 8. Period Beginning Date: 12/13/22   9. Length   [x] Not more than 1 day(s)  [] One Year     Additional Information Relevant to Certification   10. Comments or Explanations, If Necessary or Appropriate   Leg swelling, Seizure disorder ([address] Left leg cellulitis, Cellulitis of left lower extremity, max assist X2, autistic disorder, MR, decreased balance, posture and safety awareness     Certifying Practitioner Information   11. Name of Practitioner: Edgardo Sosa MD   12. [place] Medicaid Provider Number, If Applicable:  [address] Provider Identifier (NPI):      Signature Information   14. Date of Signature: 12/13/22 15. Name of Person Signing: Gilberto Hollins   12. Signature and Professional Designation: Electronically signed by ARTEMIO Jackson on 12/13/2022 at 12:12 PM     [facility] [address]  Rev. 7/2015    [address] Encounter Date/Time: 12/9/2022 Karen Farias Account: [de-identified]    MRN: [id number]    Patient: Scar Jaime    Contact Serial #: [id number]      ENCOUNTER          Patient Class: I Private Enc?   No Unit RM BD: [facility] 6410/6410-B   Hospital Service: MED   Encounter DX: Leg swelling [M79.89]   ADM Provider: Lali Rachel MD   Procedure:     ATT Provider: Rachna Burk MD   REF Provider:        Admission DX: Leg swelling, Seizure disorder [facility]), Left leg cellulitis, Cellulitis of left lower extremity and DX codes: M79.89, G40.909, L03.116, L03. 116      PATIENT                 Name: Makenna Simmons : 1945 (76 yrs)   Address: [address] Sex: Female   [facility] [address]         Marital Status: Single   Employer: UNEMPLOYED         [place]din: [employer]   Primary Care Provider: Nico Pena DO         Primary Phone: [phone]   EMERGENCY CONTACT   Contact Name Legal Guardian? Relationship to Patient Home Phone Work Phone   1. Shankar Soares  2. *No Contact Specified*      Other Relative    [phone]                 GUARANTOR            Guarantor: Makenna Simmons     : 1945   Address: [address];[facility]* Sex: Female     [place] [address]     Relation to Patient: Self       Home Phone: [phone]   Guarantor ID: [id number]       Work Phone:     Guarantor Employer: UNEMPLOYED         Status: NOT EMPLO*      COVERAGE        PRIMARY INSURANCE   Payor: MEDICARE Plan: MEDICARE PART A AND B   Payor Address: [facility] ,  [address] [address]       Group Number:   Insurance Type: INDEMNITY   Subscriber Name: Cassidy oHlt : 1945   Subscriber ID: [id number] Pat. Rel. to Sub: Self   SECONDARY INSURANCE   Payor: [address] Address:  [address] [place] [address], [place] [address], [place], [facility]          Group Number: [id number] Type: INDEMNITY   Subscriber Name: Cassidy Holt : 1945   Subscriber ID: [id number] Pat.  Rel. to Sub: SELF         CSN: [id number]

## 2022-12-10 LAB
ACINETOBACTER CALCOAC BAUMANNII COMPLEX BY PCR: NOT DETECTED
ALBUMIN SERPL-MCNC: 3.1 G/DL (ref 3.5–5.2)
ALP BLD-CCNC: 107 U/L (ref 35–104)
ALT SERPL-CCNC: 28 U/L (ref 0–32)
ANION GAP SERPL CALCULATED.3IONS-SCNC: 11 MMOL/L (ref 7–16)
AST SERPL-CCNC: 62 U/L (ref 0–31)
BACTEROIDES FRAGILIS BY PCR: NOT DETECTED
BASOPHILS ABSOLUTE: 0.03 E9/L (ref 0–0.2)
BASOPHILS RELATIVE PERCENT: 0.2 % (ref 0–2)
BILIRUB SERPL-MCNC: 0.5 MG/DL (ref 0–1.2)
BOTTLE TYPE: ABNORMAL
BUN BLDV-MCNC: 36 MG/DL (ref 6–23)
C-REACTIVE PROTEIN: 16.5 MG/DL (ref 0–0.4)
CALCIUM SERPL-MCNC: 8.2 MG/DL (ref 8.6–10.2)
CANDIDA ALBICANS BY PCR: NOT DETECTED
CANDIDA AURIS BY PCR: NOT DETECTED
CANDIDA GLABRATA BY PCR: NOT DETECTED
CANDIDA KRUSEI BY PCR: NOT DETECTED
CANDIDA PARAPSILOSIS BY PCR: NOT DETECTED
CANDIDA TROPICALIS BY PCR: NOT DETECTED
CHLORIDE BLD-SCNC: 100 MMOL/L (ref 98–107)
CO2: 24 MMOL/L (ref 22–29)
CREAT SERPL-MCNC: 1.5 MG/DL (ref 0.5–1)
CRYPTOCOCCUS NEOFORMANS/GATTII BY PCR: NOT DETECTED
ENTEROBACTER CLOACAE COMPLEX BY PCR: NOT DETECTED
ENTEROBACTERALES BY PCR: NOT DETECTED
ENTEROCOCCUS FAECALIS BY PCR: NOT DETECTED
ENTEROCOCCUS FAECIUM BY PCR: NOT DETECTED
EOSINOPHILS ABSOLUTE: 0.04 E9/L (ref 0.05–0.5)
EOSINOPHILS RELATIVE PERCENT: 0.2 % (ref 0–6)
ESCHERICHIA COLI BY PCR: NOT DETECTED
GFR SERPL CREATININE-BSD FRML MDRD: 35 ML/MIN/1.73
GLUCOSE BLD-MCNC: 117 MG/DL (ref 74–99)
HAEMOPHILUS INFLUENZAE BY PCR: NOT DETECTED
HCT VFR BLD CALC: 28.5 % (ref 34–48)
HEMOGLOBIN: 9.2 G/DL (ref 11.5–15.5)
IMMATURE GRANULOCYTES #: 0.14 E9/L
IMMATURE GRANULOCYTES %: 0.8 % (ref 0–5)
KLEBSIELLA AEROGENES BY PCR: NOT DETECTED
KLEBSIELLA OXYTOCA BY PCR: NOT DETECTED
KLEBSIELLA PNEUMONIAE GROUP BY PCR: NOT DETECTED
LACTIC ACID, SEPSIS: 2.5 MMOL/L (ref 0.5–1.9)
LACTIC ACID: 1 MMOL/L (ref 0.5–2.2)
LISTERIA MONOCYTOGENES BY PCR: NOT DETECTED
LYMPHOCYTES ABSOLUTE: 0.5 E9/L (ref 1.5–4)
LYMPHOCYTES RELATIVE PERCENT: 2.9 % (ref 20–42)
MCH RBC QN AUTO: 31.4 PG (ref 26–35)
MCHC RBC AUTO-ENTMCNC: 32.3 % (ref 32–34.5)
MCV RBC AUTO: 97.3 FL (ref 80–99.9)
MONOCYTES ABSOLUTE: 0.52 E9/L (ref 0.1–0.95)
MONOCYTES RELATIVE PERCENT: 3 % (ref 2–12)
NEISSERIA MENINGITIDIS BY PCR: NOT DETECTED
NEUTROPHILS ABSOLUTE: 16.31 E9/L (ref 1.8–7.3)
NEUTROPHILS RELATIVE PERCENT: 92.9 % (ref 43–80)
ORDER NUMBER: ABNORMAL
OVALOCYTES: ABNORMAL
PDW BLD-RTO: 14.2 FL (ref 11.5–15)
PLATELET # BLD: 225 E9/L (ref 130–450)
PMV BLD AUTO: 9.4 FL (ref 7–12)
POIKILOCYTES: ABNORMAL
POLYCHROMASIA: ABNORMAL
POTASSIUM REFLEX MAGNESIUM: 4.1 MMOL/L (ref 3.5–5)
PROTEUS SPECIES BY PCR: NOT DETECTED
PSEUDOMONAS AERUGINOSA BY PCR: NOT DETECTED
RBC # BLD: 2.93 E12/L (ref 3.5–5.5)
SALMONELLA SPECIES BY PCR: NOT DETECTED
SEDIMENTATION RATE, ERYTHROCYTE: 80 MM/HR (ref 0–20)
SERRATIA MARCESCENS BY PCR: NOT DETECTED
SODIUM BLD-SCNC: 135 MMOL/L (ref 132–146)
SOURCE OF BLOOD CULTURE: ABNORMAL
STAPHYLOCOCCUS AUREUS BY PCR: NOT DETECTED
STAPHYLOCOCCUS EPIDERMIDIS BY PCR: NOT DETECTED
STAPHYLOCOCCUS LUGDUNENSIS BY PCR: NOT DETECTED
STAPHYLOCOCCUS SPECIES BY PCR: NOT DETECTED
STENOTROPHOMONAS MALTOPHILIA BY PCR: NOT DETECTED
STREPTOCOCCUS AGALACTIAE BY PCR: NOT DETECTED
STREPTOCOCCUS PNEUMONIAE BY PCR: NOT DETECTED
STREPTOCOCCUS PYOGENES  BY PCR: DETECTED
STREPTOCOCCUS SPECIES BY PCR: DETECTED
TOTAL PROTEIN: 7.2 G/DL (ref 6.4–8.3)
TROPONIN, HIGH SENSITIVITY: 49 NG/L (ref 0–9)
TROPONIN, HIGH SENSITIVITY: 51 NG/L (ref 0–9)
WBC # BLD: 17.5 E9/L (ref 4.5–11.5)

## 2022-12-10 PROCEDURE — 6370000000 HC RX 637 (ALT 250 FOR IP): Performed by: INTERNAL MEDICINE

## 2022-12-10 PROCEDURE — 2140000000 HC CCU INTERMEDIATE R&B

## 2022-12-10 PROCEDURE — 6360000002 HC RX W HCPCS: Performed by: STUDENT IN AN ORGANIZED HEALTH CARE EDUCATION/TRAINING PROGRAM

## 2022-12-10 PROCEDURE — 6360000002 HC RX W HCPCS: Performed by: INTERNAL MEDICINE

## 2022-12-10 PROCEDURE — 87186 SC STD MICRODIL/AGAR DIL: CPT

## 2022-12-10 PROCEDURE — 87070 CULTURE OTHR SPECIMN AEROBIC: CPT

## 2022-12-10 PROCEDURE — 87077 CULTURE AEROBIC IDENTIFY: CPT

## 2022-12-10 PROCEDURE — 87147 CULTURE TYPE IMMUNOLOGIC: CPT

## 2022-12-10 PROCEDURE — 36415 COLL VENOUS BLD VENIPUNCTURE: CPT

## 2022-12-10 PROCEDURE — 85025 COMPLETE CBC W/AUTO DIFF WBC: CPT

## 2022-12-10 PROCEDURE — 85651 RBC SED RATE NONAUTOMATED: CPT

## 2022-12-10 PROCEDURE — 84484 ASSAY OF TROPONIN QUANT: CPT

## 2022-12-10 PROCEDURE — 80053 COMPREHEN METABOLIC PANEL: CPT

## 2022-12-10 PROCEDURE — 2580000003 HC RX 258: Performed by: INTERNAL MEDICINE

## 2022-12-10 PROCEDURE — 93306 TTE W/DOPPLER COMPLETE: CPT

## 2022-12-10 PROCEDURE — 87150 DNA/RNA AMPLIFIED PROBE: CPT

## 2022-12-10 PROCEDURE — 99223 1ST HOSP IP/OBS HIGH 75: CPT | Performed by: PSYCHIATRY & NEUROLOGY

## 2022-12-10 PROCEDURE — 86140 C-REACTIVE PROTEIN: CPT

## 2022-12-10 PROCEDURE — 2580000003 HC RX 258: Performed by: EMERGENCY MEDICINE

## 2022-12-10 PROCEDURE — 83605 ASSAY OF LACTIC ACID: CPT

## 2022-12-10 PROCEDURE — 87040 BLOOD CULTURE FOR BACTERIA: CPT

## 2022-12-10 RX ORDER — ONDANSETRON 2 MG/ML
4 INJECTION INTRAMUSCULAR; INTRAVENOUS EVERY 6 HOURS PRN
Status: DISCONTINUED | OUTPATIENT
Start: 2022-12-10 | End: 2022-12-14 | Stop reason: HOSPADM

## 2022-12-10 RX ORDER — ACETAMINOPHEN 650 MG/1
650 SUPPOSITORY RECTAL EVERY 6 HOURS PRN
Status: DISCONTINUED | OUTPATIENT
Start: 2022-12-10 | End: 2022-12-14 | Stop reason: HOSPADM

## 2022-12-10 RX ORDER — LEVOTHYROXINE SODIUM 0.1 MG/1
100 TABLET ORAL DAILY
Status: DISCONTINUED | OUTPATIENT
Start: 2022-12-10 | End: 2022-12-14 | Stop reason: HOSPADM

## 2022-12-10 RX ORDER — SENNA PLUS 8.6 MG/1
2 TABLET ORAL NIGHTLY
Status: DISCONTINUED | OUTPATIENT
Start: 2022-12-10 | End: 2022-12-14 | Stop reason: HOSPADM

## 2022-12-10 RX ORDER — DULOXETIN HYDROCHLORIDE 30 MG/1
60 CAPSULE, DELAYED RELEASE ORAL DAILY
Status: DISCONTINUED | OUTPATIENT
Start: 2022-12-10 | End: 2022-12-14 | Stop reason: HOSPADM

## 2022-12-10 RX ORDER — LEVETIRACETAM 500 MG/1
750 TABLET ORAL 2 TIMES DAILY
Status: DISCONTINUED | OUTPATIENT
Start: 2022-12-10 | End: 2022-12-14 | Stop reason: HOSPADM

## 2022-12-10 RX ORDER — ACETAMINOPHEN 325 MG/1
650 TABLET ORAL EVERY 6 HOURS PRN
Status: DISCONTINUED | OUTPATIENT
Start: 2022-12-10 | End: 2022-12-14 | Stop reason: HOSPADM

## 2022-12-10 RX ORDER — SODIUM CHLORIDE 9 MG/ML
INJECTION, SOLUTION INTRAVENOUS PRN
Status: DISCONTINUED | OUTPATIENT
Start: 2022-12-10 | End: 2022-12-14 | Stop reason: HOSPADM

## 2022-12-10 RX ORDER — MONTELUKAST SODIUM 10 MG/1
10 TABLET ORAL NIGHTLY
Status: DISCONTINUED | OUTPATIENT
Start: 2022-12-10 | End: 2022-12-14 | Stop reason: HOSPADM

## 2022-12-10 RX ORDER — SODIUM CHLORIDE 0.9 % (FLUSH) 0.9 %
10 SYRINGE (ML) INJECTION EVERY 12 HOURS SCHEDULED
Status: DISCONTINUED | OUTPATIENT
Start: 2022-12-10 | End: 2022-12-14 | Stop reason: HOSPADM

## 2022-12-10 RX ORDER — HYDRALAZINE HYDROCHLORIDE 25 MG/1
25 TABLET, FILM COATED ORAL 2 TIMES DAILY
Status: DISCONTINUED | OUTPATIENT
Start: 2022-12-10 | End: 2022-12-14 | Stop reason: HOSPADM

## 2022-12-10 RX ORDER — ALBUTEROL SULFATE 2.5 MG/3ML
2.5 SOLUTION RESPIRATORY (INHALATION) EVERY 4 HOURS PRN
Status: DISCONTINUED | OUTPATIENT
Start: 2022-12-10 | End: 2022-12-14 | Stop reason: HOSPADM

## 2022-12-10 RX ORDER — ARIPIPRAZOLE 2 MG/1
2 TABLET ORAL NIGHTLY
Status: DISCONTINUED | OUTPATIENT
Start: 2022-12-10 | End: 2022-12-14 | Stop reason: HOSPADM

## 2022-12-10 RX ORDER — PHENYTOIN SODIUM 100 MG/1
200 CAPSULE, EXTENDED RELEASE ORAL NIGHTLY
Status: DISCONTINUED | OUTPATIENT
Start: 2022-12-10 | End: 2022-12-14 | Stop reason: HOSPADM

## 2022-12-10 RX ORDER — ONDANSETRON 4 MG/1
4 TABLET, ORALLY DISINTEGRATING ORAL EVERY 8 HOURS PRN
Status: DISCONTINUED | OUTPATIENT
Start: 2022-12-10 | End: 2022-12-14 | Stop reason: HOSPADM

## 2022-12-10 RX ORDER — CARVEDILOL 25 MG/1
25 TABLET ORAL 2 TIMES DAILY
Status: DISCONTINUED | OUTPATIENT
Start: 2022-12-10 | End: 2022-12-14 | Stop reason: HOSPADM

## 2022-12-10 RX ORDER — SODIUM CHLORIDE 0.9 % (FLUSH) 0.9 %
10 SYRINGE (ML) INJECTION PRN
Status: DISCONTINUED | OUTPATIENT
Start: 2022-12-10 | End: 2022-12-14 | Stop reason: HOSPADM

## 2022-12-10 RX ORDER — PHENYTOIN SODIUM 100 MG/1
100 CAPSULE, EXTENDED RELEASE ORAL DAILY
Status: DISCONTINUED | OUTPATIENT
Start: 2022-12-10 | End: 2022-12-14 | Stop reason: HOSPADM

## 2022-12-10 RX ORDER — FAMOTIDINE 20 MG/1
20 TABLET, FILM COATED ORAL DAILY
Status: DISCONTINUED | OUTPATIENT
Start: 2022-12-10 | End: 2022-12-14 | Stop reason: HOSPADM

## 2022-12-10 RX ORDER — ATORVASTATIN CALCIUM 10 MG/1
10 TABLET, FILM COATED ORAL DAILY
Status: DISCONTINUED | OUTPATIENT
Start: 2022-12-10 | End: 2022-12-14 | Stop reason: HOSPADM

## 2022-12-10 RX ADMIN — ARIPIPRAZOLE 2 MG: 2 TABLET ORAL at 21:20

## 2022-12-10 RX ADMIN — APIXABAN 5 MG: 5 TABLET, FILM COATED ORAL at 09:00

## 2022-12-10 RX ADMIN — FAMOTIDINE 20 MG: 20 TABLET, FILM COATED ORAL at 09:04

## 2022-12-10 RX ADMIN — DULOXETINE HYDROCHLORIDE 60 MG: 30 CAPSULE, DELAYED RELEASE ORAL at 09:01

## 2022-12-10 RX ADMIN — LEVETIRACETAM 750 MG: 500 TABLET, FILM COATED ORAL at 09:41

## 2022-12-10 RX ADMIN — SODIUM CHLORIDE: 9 INJECTION, SOLUTION INTRAVENOUS at 11:07

## 2022-12-10 RX ADMIN — HYDRALAZINE HYDROCHLORIDE 25 MG: 25 TABLET, FILM COATED ORAL at 09:00

## 2022-12-10 RX ADMIN — CARVEDILOL 25 MG: 25 TABLET, FILM COATED ORAL at 09:41

## 2022-12-10 RX ADMIN — APIXABAN 5 MG: 5 TABLET, FILM COATED ORAL at 21:21

## 2022-12-10 RX ADMIN — MEROPENEM 1000 MG: 1 INJECTION, POWDER, FOR SOLUTION INTRAVENOUS at 22:54

## 2022-12-10 RX ADMIN — PHENYTOIN SODIUM 100 MG: 100 CAPSULE ORAL at 09:00

## 2022-12-10 RX ADMIN — LEVETIRACETAM 750 MG: 500 TABLET, FILM COATED ORAL at 21:19

## 2022-12-10 RX ADMIN — Medication 1500 MG: at 00:34

## 2022-12-10 RX ADMIN — MONTELUKAST 10 MG: 10 TABLET, FILM COATED ORAL at 21:21

## 2022-12-10 RX ADMIN — HYDRALAZINE HYDROCHLORIDE 25 MG: 25 TABLET, FILM COATED ORAL at 21:21

## 2022-12-10 RX ADMIN — SENNOSIDES 17.2 MG: 8.6 TABLET, FILM COATED ORAL at 21:20

## 2022-12-10 RX ADMIN — PHENYTOIN SODIUM 200 MG: 100 CAPSULE ORAL at 21:20

## 2022-12-10 RX ADMIN — LEVOTHYROXINE SODIUM 100 MCG: 0.1 TABLET ORAL at 06:33

## 2022-12-10 RX ADMIN — CARVEDILOL 25 MG: 25 TABLET, FILM COATED ORAL at 21:21

## 2022-12-10 RX ADMIN — MEROPENEM 1000 MG: 1 INJECTION, POWDER, FOR SOLUTION INTRAVENOUS at 11:08

## 2022-12-10 RX ADMIN — ATORVASTATIN CALCIUM 10 MG: 10 TABLET, FILM COATED ORAL at 09:00

## 2022-12-10 RX ADMIN — SODIUM CHLORIDE, PRESERVATIVE FREE 10 ML: 5 INJECTION INTRAVENOUS at 09:04

## 2022-12-10 NOTE — CONSULTS
Patient with h/o epilepsy since age 13 with rare seizures. She is unable to recall last seizure prior to this event. Currently on Dilantin with good control. States that she has GTC and petit mal seizures. With no seizure nursing home prior to presentation with grand mal seizures reported. She indicates that she has been having a lot of swelling in the left leg and appears to have some cellulitis in the left leg may have been a trigger since infection can lower seizure threshold. Diagnosis:    1. Epilepsy with breakthrough seizures  2. Cellulitis in left leg and sepsis, which may have been a potential trigger to breakthrough seizure. 3.  Morbid obesity  4. Sepsis with + blood cultures x2. Plan:    1. Free Dilantin level since normal dilantin level and low albumin. 2.  Add Keppra 500 mg BID  3. Treatment of her infection and sepsis. 4.   Further on follow up.

## 2022-12-10 NOTE — H&P
Hospitalist History & Physical      PATIENT NAME:  Eduardo Rios    MRN:  [id number]  SERVICE DATE:  12/9/22    Primary Care Physician: Mark Oh DO       SUBJECTIVE  CHIEF COMPLAINT:  had concerns including Fever and Seizures (Pt to ED from [facility] with reports of fevers x2 days. Hx epilepsy and taking meds. Per report staff stated pt had seizure today. +cellulitis to L-leg. Pt arrives A&Ox4). HPI:  Ms. Eduardo Rios, a 76y.o. year old female  who  has a past medical history of Asthma, Autistic disorder, residual state, Cellulitis of left leg, Chronic major depressive disorder, recurrent episode ([address] Dermatophytosis, Hx of blood clots, Hyperlipidemia, Hypertension, Hypothyroidism, Lower limb ulcer, calf, left, with fat layer exposed ([address] Lymphedema of both lower extremities, Mental retardation, Obesity, Osteoarthritis, and Seizures ([address] presents with fever and chills for couple days, denies cough no chest pain no nausea or vomiting, noted to see her legs erythematous no dysuria. Reports to have a seizure earlier today.     PAST MEDICAL HISTORY:    Past Medical History:   Diagnosis Date    Asthma     Autistic disorder, residual state     Cellulitis of left leg 8/7/2017    Chronic major depressive disorder, recurrent episode ([address]     Dermatophytosis 8/7/2017    Hx of blood clots     Hyperlipidemia     Hypertension     Hypothyroidism     Lower limb ulcer, calf, left, with fat layer exposed ([address] 1/20/2020    Lymphedema of both lower extremities 8/7/2017    Mental retardation     mild    Obesity     Osteoarthritis     Seizures ([address]     none recent as of 9/18     PAST SURGICAL HISTORY:    Past Surgical History:   Procedure Laterality Date    [place] Left 9/14/2018    LEFT EYE CATARACT EXTRACTION IOL IMPLANT  ++LATEX ALLERGY++ performed by Anuradha Pardo MD at [facility] W/O ECP Right 10/12/2018    RIGHT EYE CATARACT EXTRACTION IOL IMPLANT  ++LATEX ALLERGY++ performed by George Fenton MD at [address] [facility]  age 8     FAMILY HISTORY:  History reviewed. No pertinent family history. SOCIAL HISTORY:    Social History     Socioeconomic History    Marital status: Single     Spouse name: Not on file    Number of children: Not on file    Years of education: Not on file    Highest education level: Not on file   Occupational History    Not on file   Tobacco Use    Smoking status: Never    Smokeless tobacco: Never   Vaping Use    Vaping Use: Never used   Substance and Sexual Activity    Alcohol use: No    Drug use: No    Sexual activity: Never   Other Topics Concern    Not on file   Social History Narrative    Not on file     Social Determinants of Health     Financial Resource Strain: Not on file   Food Insecurity: Not on file   Transportation Needs: Not on file   Physical Activity: Not on file   Stress: Not on file   Social Connections: Not on file   Intimate Partner Violence: Not on file   Housing Stability: Not on file    TOBACCO:   reports that she has never smoked. She has never used smokeless tobacco.  ETOH:   reports no history of alcohol use. MEDICATIONS:   Prior to Admission medications    Medication Sig Start Date End Date Taking?  Authorizing Provider   hydrALAZINE (APRESOLINE) 25 MG tablet Take 1 tablet by mouth 2 times daily 9/24/22   Diane Pizarro MD   atorvastatin (LIPITOR) 10 MG tablet Take 1 tablet by mouth daily 9/25/22   Diane Pizarro MD   albuterol (PROVENTIL) (2.5 MG/3ML) 0.083% nebulizer solution Take 2.5 mg by nebulization every 4 hours as needed for Wheezing    Historical Provider, MD   ammonium lactate (AMLACTIN) 12 % cream Apply topically as needed for Dry Skin    Historical Provider, MD   bumetanide (BUMEX) 1 MG tablet Take 2 mg by mouth daily    Historical Provider, MD   phenytoin (DILANTIN) 100 MG ER capsule Take 100 mg by mouth daily    Historical Provider, MD phenytoin (DILANTIN) 100 MG ER capsule Take 200 mg by mouth at bedtime    Historical Provider, MD   levETIRAcetam (KEPPRA) 750 MG tablet Take 750 mg by mouth 2 times daily    Historical Provider, MD   magnesium hydroxide (MILK OF MAGNESIA) 400 MG/5ML suspension Take 30 mLs by mouth every 72 hours as needed for Constipation    Historical Provider, MD   Calcium Carb-Cholecalciferol (OYSTER SHELL CALCIUM/D) 500-400 MG-UNIT TABS Take 1 tablet by mouth in the morning and at bedtime    Historical Provider, MD   apixaban (ELIQUIS) 5 MG TABS tablet Take 5 mg by mouth 2 times daily    Historical Provider, MD   camphor-menthol (SARNA) 0.5-0.5 % lotion Apply topically as needed for Itching (apply to lower extremities) Apply topically as needed.     Historical Provider, MD   DULoxetine (CYMBALTA) 60 MG extended release capsule Take 60 mg by mouth daily     Historical Provider, MD   oxybutynin (DITROPAN) 5 MG tablet Take 2.5 mg by mouth 2 times daily    Historical Provider, MD   levothyroxine (SYNTHROID) 100 MCG tablet Take 100 mcg by mouth Daily For a total of 188 mcg    Historical Provider, MD   famotidine (PEPCID) 20 MG tablet Take 20 mg by mouth daily     Historical Provider, MD   ARIPiprazole (ABILIFY) 2 MG tablet Take 2 mg by mouth nightly    Historical Provider, MD   carvedilol (COREG) 25 MG tablet Take 25 mg by mouth 2 times daily     Historical Provider, MD   fluocinonide (LIDEX) 0.05 % cream Apply topically daily as needed (apply to both ears for dry skin)    Historical Provider, MD   senna (SENOKOT) 8.6 MG tablet Take 2 tablets by mouth nightly     Historical Provider, MD   montelukast (SINGULAIR) 10 MG tablet Take 10 mg by mouth nightly    Historical Provider, MD   Multiple Vitamins-Minerals (THERAPEUTIC MULTIVITAMIN-MINERALS) tablet Take 1 tablet by mouth daily Ld 10/10/2018    Historical Provider, MD        ALLERGIES: Latex, Cephalosporins, Clindamycin/lincomycin, Duoderm hydroactive [hydroactive dressings], Pcn [penicillins], and Tape [adhesive tape]    REVIEW OF SYSTEM:   ROS as noted in HPI, 12 point ROS reviewed and otherwise negative. OBJECTIVE  PHYSICAL EXAM:   Vitals:    12/09/22 1918 12/09/22 2258   BP: (!) 119/59    Pulse: 91    Resp: 17    Temp: (!) 100.5 °F (38.1 °C)    TempSrc: Oral    SpO2: 94%    Weight:  222 lb (100.7 kg)       General appearance: alert, appears stated age and cooperative  CONSTITUTIONAL:  no apparent distress  ENT:  normocephalic, without obvious abnormality, atraumatic  NECK:  supple, symmetrical, trachea midline  Heart: regular rate and rhythm, S1, S2 normal   Lungs: clear to auscultation bilaterally  Abdomen: soft lax, not tender, not distended, positive bowel sounds  Extremities: extremities normal, atraumatic, no cyanosis, edema  Skin: erythema noted of right leg and left leg, wounds of legs and dressing of left leg  Neurologic:  Neurovascularly intact without any focal sensory/motor deficits. Cranial nerves: II-XII intact, grossly non-focal.  Psychiatric: Alert and oriented, thought content appropriate, normal insight      DATA:     Diagnostic tests reviewed for today's visit:    Most recent labs and imaging results reviewed.    Labs:   Recent Results (from the past 72 hour(s))   CBC with Auto Differential    Collection Time: 12/09/22  8:41 PM   Result Value Ref Range    WBC 26.9 (H) 4.5 - 11.5 E9/L    RBC 3.34 (L) 3.50 - 5.50 E12/L    Hemoglobin 10.4 (L) 11.5 - 15.5 g/dL    Hematocrit 32.5 (L) 34.0 - 48.0 %    MCV 97.3 80.0 - 99.9 fL    MCH 31.1 26.0 - 35.0 pg    MCHC 32.0 32.0 - 34.5 %    RDW 14.3 11.5 - 15.0 fL    Platelets 754 958 - 958 E9/L    MPV 9.4 7.0 - 12.0 fL    Neutrophils % 96.5 (H) 43.0 - 80.0 %    Lymphocytes % 1.8 (L) 20.0 - 42.0 %    Monocytes % 1.1 (L) 2.0 - 12.0 %    Eosinophils % 0.0 0.0 - 6.0 %    Basophils % 0.1 0.0 - 2.0 %    Neutrophils Absolute 26.36 (H) 1.80 - 7.30 E9/L    Lymphocytes Absolute 0.54 (L) 1.50 - 4.00 E9/L    Monocytes Absolute 0.00 (L) 0.10 - 0.95 E9/L    Eosinophils Absolute 0.00 (L) 0.05 - 0.50 E9/L    Basophils Absolute 0.00 0.00 - 0.20 E9/L    Metamyelocytes Relative 1.7 (H) 0.0 - 1.0 %    Toxic Granulation 1+     Vacuolated Neutrophils 1+     Poikilocytes 1+     Ovalocytes 1+     Stomatocytes 1+    Comprehensive Metabolic Panel    Collection Time: 12/09/22  8:41 PM   Result Value Ref Range    Sodium 140 132 - 146 mmol/L    Potassium 4.7 3.5 - 5.0 mmol/L    Chloride 102 98 - 107 mmol/L    CO2 23 22 - 29 mmol/L    Anion Gap 15 7 - 16 mmol/L    Glucose 125 (H) 74 - 99 mg/dL    BUN 33 (H) 6 - 23 mg/dL    Creatinine 1.3 (H) 0.5 - 1.0 mg/dL    Est, Glom Filt Rate 42 >=60 mL/min/1.73    Calcium 9.1 8.6 - 10.2 mg/dL    Total Protein 8.0 6.4 - 8.3 g/dL    Albumin 3.4 (L) 3.5 - 5.2 g/dL    Total Bilirubin 0.4 0.0 - 1.2 mg/dL    Alkaline Phosphatase 126 (H) 35 - 104 U/L    ALT 25 0 - 32 U/L    AST 48 (H) 0 - 31 U/L   Troponin    Collection Time: 12/09/22  8:41 PM   Result Value Ref Range    Troponin, High Sensitivity 39 (H) 0 - 9 ng/L   RAPID INFLUENZA A/B ANTIGENS    Collection Time: 12/09/22  8:41 PM    Specimen: Nasopharyngeal   Result Value Ref Range    Influenza A by PCR Not Detected Not Detected    Influenza B by PCR Not Detected Not Detected   COVID-19, Rapid    Collection Time: 12/09/22  8:41 PM    Specimen: Nasopharyngeal Swab   Result Value Ref Range    SARS-CoV-2, NAAT Not Detected Not Detected   Lactate, Sepsis    Collection Time: 12/09/22  8:41 PM   Result Value Ref Range    Lactic Acid, Sepsis 2.3 (H) 0.5 - 1.9 mmol/L   Phenytoin Level, Total    Collection Time: 12/09/22  8:41 PM   Result Value Ref Range    Phenytoin Lvl 11.7 10.0 - 20.0 ug/mL    Phenytoin Dose Amount Unknown    Brain Natriuretic Peptide    Collection Time: 12/09/22  8:41 PM   Result Value Ref Range    Pro-BNP 3,524 (H) 0 - 450 pg/mL   EKG 12 Lead    Collection Time: 12/09/22 10:28 PM   Result Value Ref Range    Ventricular Rate 81 BPM    Atrial Rate 81 BPM    P-R Interval 156 ms QRS Duration 72 ms    Q-T Interval 382 ms    QTc Calculation (Bazett) 443 ms    P Axis 62 degrees    R Axis 6 degrees    T Axis 37 degrees     Oupatient labs:  Lab Results   Component Value Date    TSH 0.177 (L) 09/22/2022       Urinalysis:    Lab Results   Component Value Date/Time    NITRU Negative 12/22/2020 05:28 PM    WBCUA 5-10 12/22/2020 05:28 PM    BACTERIA MANY 12/22/2020 05:28 PM    RBCUA 1-3 12/22/2020 05:28 PM    BLOODU SMALL 12/22/2020 05:28 PM    SPECGRAV 1.015 12/22/2020 05:28 PM    GLUCOSEU Negative 12/22/2020 05:28 PM       Imaging:  XR CHEST PORTABLE    Result Date: 12/9/2022  EXAMINATION: ONE XRAY VIEW OF THE CHEST 12/9/2022 9:04 pm COMPARISON: None. HISTORY: ORDERING SYSTEM PROVIDED HISTORY: fever FINDINGS: Heart size is normal.  Mild diffuse interstitial opacities in the lungs. No pleural effusion or pneumothorax. Mild diffuse interstitial opacities in the lungs may represent pulmonary edema or atypical pneumonia. XR CHEST PORTABLE   Final Result   Mild diffuse interstitial opacities in the lungs may represent pulmonary   edema or atypical pneumonia. ASSESSMENT AND PLAN  Principal Problem:    Left leg cellulitis  Resolved Problems:    * No resolved hospital problems.  *    - leg cellulitis   - Leukocytosis   - SHAWN on CKD  - Elevated lactic acid  - Seizure   - HTN  - HLP  - Hypothyroidism       Plan:  - admit to tele monitoring   - serial troponin and EKG   - Echo  - continue eliquis  - started Iv meropenem and vanc in ER, will continue the same, follow ESR and CRP  - Wound care consult   - trend lactic acid   - follow cultures   - resume home dilantin and keppra  - seizure precautions   - monitor I/o, avoid nephrotoxics       VTE Prophylaxis: eliquis   DVT Prophylaxis: []Lovenox []Heparin []PCD [] [address] []Encouraged ambulation    Diet: No diet orders on file  Code Status: Prior  Surrogate decision maker confirmed with patient:  Primary Emergency Contact: Shankar Soares, Home Phone: [phone]      Disposition: []Med/Surg [x] Intermediate [] ICU/CCU   Admit status: [] Observation [x] Inpatient       Additional work up or/and treatment plan may be added today or thereafter based on clinical progression. I am managing a portion of pt care. Some medical issues are handled by other specialists. Additional work up and treatment should be done by my colleague hospitalist and at out pt setting by pt PCP and other out pt providers.      Seferino Ashley MD  DATE: December 9, 2022

## 2022-12-10 NOTE — CONSULTS
NEUROLOGY CONSULT NOTE      Requesting Physician:  Joelle Weaver MD    Reason for Consult:  Evaluate for seizure    History of Present Illness:  Praneeth Crawford is a 76 y.o. female  with h/o Seizures, HTN, HLD, Hypothyroidism, Autistic Disorder, lymphedema in bilateral lower extremities, and history of DVTs who was admitted to [facility] on 12/9/2022 with presentation of fever and seizures. Patient is a resident at [facility] and reportedly has been experiencing fever for 2 days prior to admission. He also history of cellulitis in the left leg. On day of presentation the staff had noticed witnessed seizure activity. There was no report of any specific precipitating event or factors. Patient denied any shortness of breath, chest pain, cough, nausea, vomiting, abdominal pain, headache, dizziness weakness, numbness, tingling, dizziness, vertigo, problems with motor function. On presentation to the emergency room he did have a fever of 100.5 °F with blood pressure 119/59. Labs were notable for increased BUN and creatinine 33 and 1.3 respectively. Lactic acid was elevated at 2.3 with glucose of 125. ProBNP was 3524 with troponin 39. Pleocytosis noted with WBC count 26.9 that was 96.5% neutrophils. Blood cultures x2 were positive for gram-positive cocci in chains.       Past Medical History:        Diagnosis Date    Asthma     Autistic disorder, residual state     Cellulitis of left leg 8/7/2017    Chronic major depressive disorder, recurrent episode ([address]     Dermatophytosis 8/7/2017    Hx of blood clots     Hyperlipidemia     Hypertension     Hypothyroidism     Lower limb ulcer, calf, left, with fat layer exposed ([address] 1/20/2020    Lymphedema of both lower extremities 8/7/2017    Mental retardation     mild    Obesity     Osteoarthritis     Seizures ([address]     none recent as of 9/18           Procedure Laterality Date    APPENDECTOMY  1969    UT CORNEAL TRANSPLANT,ENDOTHELIAL Left 9/14/2018 No additional comment LEFT EYE CATARACT EXTRACTION IOL IMPLANT  ++LATEX ALLERGY++ performed by Geno Enriquez MD at [facility] 67 RMVL INSJ IO LENS PROSTH W/O ECP Right 10/12/2018    RIGHT EYE CATARACT EXTRACTION IOL IMPLANT  ++LATEX ALLERGY++ performed by Geno Enriquez MD at [facility] 52  age 8       Social History:  Social History     Tobacco Use   Smoking Status Never   Smokeless Tobacco Never     Social History     Substance and Sexual Activity   Alcohol Use No     Social History     Substance and Sexual Activity   Drug Use No     Single    Family History:   History reviewed. No pertinent family history. Review of Systems:  All systems reviewed are negative except what is mentioned in history of present illness. Allergies:     Allergies   Allergen Reactions    Latex     Cephalosporins     Clindamycin/Lincomycin     Duoderm Hydroactive [Hydroactive Dressings]     Pcn [Penicillins]     Tape Lasha Gums Tape]      surgical        Current Medications:   albuterol (PROVENTIL) nebulizer solution 2.5 mg, Q4H PRN  apixaban (ELIQUIS) tablet 5 mg, BID  ARIPiprazole (ABILIFY) tablet 2 mg, Nightly  atorvastatin (LIPITOR) tablet 10 mg, Daily  carvedilol (COREG) tablet 25 mg, BID  DULoxetine (CYMBALTA) extended release capsule 60 mg, Daily  famotidine (PEPCID) tablet 20 mg, Daily  hydrALAZINE (APRESOLINE) tablet 25 mg, BID  levETIRAcetam (KEPPRA) tablet 750 mg, BID  levothyroxine (SYNTHROID) tablet 100 mcg, Daily  montelukast (SINGULAIR) tablet 10 mg, Nightly  phenytoin (DILANTIN) ER capsule 100 mg, Daily  phenytoin (DILANTIN) ER capsule 200 mg, Nightly  senna (SENOKOT) tablet 17.2 mg, Nightly  sodium chloride flush 0.9 % injection 10 mL, 2 times per day  sodium chloride flush 0.9 % injection 10 mL, PRN  0.9 % sodium chloride infusion, PRN  ondansetron (ZOFRAN-ODT) disintegrating tablet 4 mg, Q8H PRN   Or  ondansetron (ZOFRAN) injection 4 mg, Q6H PRN  magnesium hydroxide (MILK OF MAGNESIA) 400 MG/5ML suspension 30 mL, Daily PRN  acetaminophen (TYLENOL) tablet 650 mg, Q6H PRN   Or  acetaminophen (TYLENOL) suppository 650 mg, Q6H PRN  meropenem (MERREM) 1,000 mg in sodium chloride 0.9 % 100 mL IVPB (mini-bag), Q12H  vancomycin 1500 mg in dextrose 5% 300 mL IVPB, Q24H  0.9 % sodium chloride infusion, Continuous         Physical Exam:  BP (!) 118/37   Pulse 98   Temp 98.6 °F (37 °C) (Oral)   Resp 16   Ht 5' (1.524 m)   Wt 222 lb (100.7 kg)   SpO2 98%   BMI 43.36 kg/m²  I Body mass index is 43.36 kg/m². I   Wt Readings from Last 1 Encounters:   12/10/22 222 lb (100.7 kg)          HEENT: Normocephalic, atraumatic, no lesions or abnormalities noted. Neck:  supple with full ROM; no masses, nodes or bruits; no cervical tenderness on palpation. Lungs:  clear to auscultation  bilaterally     CV: RRR without gallops or murmurs     Extremities: no c/c; Swelling and redness in left leg with bandaged tibial area from wound site. Neurologic Exam   Mental Status:  Patient was alert, responsive, oriented, appropriate, answering questions, and following commands. Speech was fluent with   Cranial Nerves: Pupils were equal round and reactive to light;    Visual fields were full on confrontation; Extraocular movements were intact; no nystagmus; Intact facial sensation to temp, pinprick, and light touch; Symmetric facial movements with good lip and eye closure bilaterally; Hearing was intact; Akhtar was midline;    Normal palatal elevation with midline uvula  Trapezius strength of 5/5. Tongue was midline with no atrophy or fasciculations  Motor Exam:  Strength was 5/5 throughout; normal tone and bulk; no    Sensory Exam:  Normal sensation to light touch, pin-prick, and temperature; No sensory extinction. Cerebella Exam:  Intact finger-nose-finger, rapidly alternating movements, fine motor movements, and heel-down-shin maneuvers; No cerebellar rebound or drift; No tremors   Normal tandem gait.  Negative Romberg   (Normal cerebellum function on gross exam)    Gait:  Gait was not tested. Reflexes: normal and symmetric in bilateral UE; LE reflexes not tested secondary to patient complaint of pain. Labs:    CBC:   Recent Labs     12/09/22 2041   WBC 26.9*   HGB 10.4*      MCV 97.3   MCH 31.1   MCHC 32.0   RDW 14.3     CMP:  Recent Labs     12/09/22 2041      K 4.7      CO2 23   BUN 33*   CREATININE 1.3*   LABGLOM 42   GLUCOSE 125*   CALCIUM 9.1     Liver:   Recent Labs     12/09/22 2041   AST 48*   ALT 25   ALKPHOS 126*   PROT 8.0   LABALBU 3.4*   BILITOT 0.4     INR: No results for input(s): PROTIME, INR in the last 72 hours. Toxicology  Recent Labs     12/09/22 2041   PHENYTOIN 11.7     No results for input(s): AMPMETHURSCR, BARBTQTU, BDZQTU, CANNABQUANT, COCMETQTU, OPIAU, PCPQUANT in the last 72 hours. Radiology:  XR CHEST PORTABLE    Result Date: 12/9/2022  EXAMINATION: ONE XRAY VIEW OF THE CHEST 12/9/2022 9:04 pm COMPARISON: None. HISTORY: ORDERING SYSTEM PROVIDED HISTORY: fever FINDINGS: Heart size is normal.  Mild diffuse interstitial opacities in the lungs. No pleural effusion or pneumothorax. Mild diffuse interstitial opacities in the lungs may represent pulmonary edema or atypical pneumonia. The patient's records from referring provider and available information in the EHR was reviewed. Impression:  Epilepsy with breakthrough seizures  Cellulitis in left leg and sepsis, which may have been a potential trigger to breakthrough seizure. Morbid obesity. Sepsis with + blood cultures x2. Patient with h/o epilepsy since age 13 with rare seizures. She is unable to recall last seizure prior to this event. Currently on Dilantin with good control. States that she has GTC and petit mal seizures. With no seizure nursing home prior to presentation with grand mal seizures reported.   She indicates that she has been having a lot of swelling in the left leg and appears to have some cellulitis in the left leg may have been a trigger since infection can lower seizure threshold. Principal Problem:    Left leg cellulitis  Active Problems:    Leg swelling  Resolved Problems:    * No resolved hospital problems. *      Recommendations:                                            Free Dilantin level since normal dilantin level and low albumin  Add Keppra 500 mg BID  Treatment of her infection and sepsis. Further on follow up. It was my pleasure to evaluate [employer] today. Please call with questions.       Electronically signed by Anish Sánchez MD on 12/10/2022 at 1:38 PM

## 2022-12-10 NOTE — PLAN OF CARE
Problem: Discharge Planning  Goal: Discharge to home or other facility with appropriate resources  Outcome: Progressing     Problem: Skin/Tissue Integrity  Goal: Absence of new skin breakdown  Description: 1. Monitor for areas of redness and/or skin breakdown  2. Assess vascular access sites hourly  3. Every 4-6 hours minimum:  Change oxygen saturation probe site  4. Every 4-6 hours:  If on nasal continuous positive airway pressure, respiratory therapy assess nares and determine need for appliance change or resting period.   Outcome: Progressing     Problem: Infection - Adult  Goal: Absence of infection at discharge  Outcome: Progressing

## 2022-12-10 NOTE — ED NOTES
Patient found scratching at left lower extremity stating \"it's itchy. \" Dressing was taken down for ED physician to assess wounds. This RN educated patient that she can't be scratching at her leg and causing open wounds because it will cause infection. Dressing to left lower extremity reapplied.      Mechelle Eisenberg RN  12/10/22 7541

## 2022-12-10 NOTE — PROGRESS NOTES
This patient is on medication that requires renal, weight, and/or indication dose adjustment. Date Body Weight IBW  Adjusted BW SCr  CrCl Dialysis status   12/10/2022 222 lb (100.7 kg) Ideal body weight: 45.5 kg (100 lb 4.9 oz)  Adjusted ideal body weight: 67.6 kg (148 lb 15.8 oz) Serum creatinine: 1.3 mg/dL (H) 12/09/22 2041  Estimated creatinine clearance: 37 mL/min (A) N/a       Pharmacy has dose-adjusted the following medication(s):    Date Previous Order Adjusted Order   12/10/2022 Merrem 1000mg every 8 hrs Merrem 1000mg every 12 hrs       These changes were made per protocol according to the [address] for Pharmacists. *Please note this dose may need readjusted if patient's condition changes. Please contact pharmacy with any questions regarding these changes.     Sadiq Lucas [facility]  12/10/2022  3:38 AM

## 2022-12-10 NOTE — PROGRESS NOTES
Pharmacy Consultation Note  (Antibiotic Dosing and Monitoring)    Initial consult date: 12/10/22  Consulting physician/provider: Anel Pathak  Drug: Vancomycin  Indication: Cellulitis    Age/  Gender Height Weight IBW  Allergy Information   77 y.o./female 5' (152.4 cm) 222 lb (100.7 kg)     Ideal body weight: 45.5 kg (100 lb 4.9 oz)  Adjusted ideal body weight: 67.6 kg (148 lb 15.8 oz)   Latex, Cephalosporins, Clindamycin/lincomycin, Duoderm hydroactive [hydroactive dressings], Pcn [penicillins], and Tape [adhesive tape]      Renal Function:  Recent Labs     12/09/22 2041   BUN 33*   CREATININE 1.3*     No intake or output data in the 24 hours ending 12/10/22 0342    Vancomycin Monitoring:  Trough:  No results for input(s): VANCOTROUGH in the last 72 hours. Random:  No results for input(s): VANCORANDOM in the last 72 hours. No results for input(s): Aileen Scale in the last 72 hours. Historical Cultures:  Organism   Date Value Ref Range Status   09/20/2019 Pseudomonas aeruginosa (A)  Final     No results for input(s): BC in the last 72 hours. Vancomycin Administration Times:  Recent vancomycin administrations                     vancomycin 1500 mg in dextrose 5% 300 mL IVPB (mg) 1,500 mg New Bag 12/10/22 0034                    Assessment:  Patient is a 76 y.o. female who has been initiated on vancomycin  Estimated Creatinine Clearance: 37 mL/min (A) (based on SCr of 1.3 mg/dL (H)). To dose vancomycin, pharmacy will be utilizing [employer] calculation software for goal AUC/SHER 400-600 mg/L-hr    Plan:   Will continue vancomycin 1500 mg IV every 24 hours  Will check vancomycin levels when appropriate  Will continue to monitor renal function   Pharmacy to follow      Junaid Scanlon, [address] [place] [address] 12/10/2022 3:42 AM

## 2022-12-10 NOTE — ED PROVIDER NOTES
HPI:  12/9/22, Time: 8:16 PM CONI Kim is a 76 y.o. female presenting to the ED for history of fever chills, beginning 1 day ago. The complaint has been persistent, moderate in severity, and worsened by nothing. Patient reporting fever and having chills. Patient reporting no chest pain or difficulty breathing. Patient reporting no abdominal pain vomiting or diarrhea. Patient was brought in by EMS. Patient reportedly had a seizure earlier today. Patient does have a history of seizures she is on Dilantin there is no history of fall or injury. Patient does have a history of DVT and noted that her lower extremities were erythematous. Patient does have fever here. Patient reporting no urinary symptoms. ROS:   Pertinent positives and negatives are stated within HPI, all other systems reviewed and are negative.  --------------------------------------------- PAST HISTORY ---------------------------------------------  Past Medical History:  has a past medical history of Asthma, Autistic disorder, residual state, Cellulitis of left leg, Chronic major depressive disorder, recurrent episode ([address] Dermatophytosis, Hx of blood clots, Hyperlipidemia, Hypertension, Hypothyroidism, Lower limb ulcer, calf, left, with fat layer exposed ([address] Lymphedema of both lower extremities, Mental retardation, Obesity, Osteoarthritis, and Seizures ([address] Past Surgical History:  has a past surgical history that includes Tonsillectomy (age 8); Appendectomy (1969); pr corneal transplant,endothelial (Left, 9/14/2018); and pr xcapsl ctrc rmvl insj io lens prosth w/o ecp (Right, 10/12/2018). Social History:  reports that she has never smoked. She has never used smokeless tobacco. She reports that she does not drink alcohol and does not use drugs. Family History: family history is not on file. The patients home medications have been reviewed.     Allergies: Latex, Cephalosporins, Clindamycin/lincomycin, Duoderm hydroactive [hydroactive dressings], Pcn [penicillins], and Tape [adhesive tape]    ---------------------------------------------------PHYSICAL EXAM--------------------------------------    Constitutional/General: Alert and oriented x3,   Head: Normocephalic and atraumatic  Eyes: PERRL, EOMI  Mouth: Oropharynx clear, handling secretions, no trismus  Neck: Supple, full ROM, non tender to palpation in the midline, no stridor, no crepitus, no meningeal signs  Pulmonary: Lungs clear to auscultation bilaterally, no wheezes, rales, or rhonchi. Not in respiratory distress  Cardiovascular:  Regular rate. Regular rhythm. No murmurs, gallops, or rubs. 2+ distal pulses  Chest: no chest wall tenderness  Abdomen: Soft. Non tender. Non distended. +BS. No rebound, guarding, or rigidity. No pulsatile masses appreciated. Musculoskeletal: Moves all extremities x 4. Warm and well perfused, no clubbing, cyanosis, edema lower extremities  Skin: Erythema noted to anterior portion of right lower extremity as well as diffuse erythema to her left leg. Patient has noted wounds to lower extremities there is a dressing to the left leg that was removed and no obvious ulcer  Neurologic: GCS 15, CN 2-12 grossly intact, no focal deficits, symmetric strength 5/5 in the upper  limited range of motion lower extremities  Psych: Normal Affect    -------------------------------------------------- RESULTS -------------------------------------------------  I have personally reviewed all laboratory and imaging results for this patient. Results are listed below.      LABS:  Results for orders placed or performed during the hospital encounter of 12/09/22   RAPID INFLUENZA A/B ANTIGENS    Specimen: Nasopharyngeal   Result Value Ref Range    Influenza A by PCR Not Detected Not Detected    Influenza B by PCR Not Detected Not Detected   COVID-19, Rapid    Specimen: Nasopharyngeal Swab   Result Value Ref Range    SARS-CoV-2, NAAT Not Detected Not Detected CBC with Auto Differential   Result Value Ref Range    WBC 26.9 (H) 4.5 - 11.5 E9/L    RBC 3.34 (L) 3.50 - 5.50 E12/L    Hemoglobin 10.4 (L) 11.5 - 15.5 g/dL    Hematocrit 32.5 (L) 34.0 - 48.0 %    MCV 97.3 80.0 - 99.9 fL    MCH 31.1 26.0 - 35.0 pg    MCHC 32.0 32.0 - 34.5 %    RDW 14.3 11.5 - 15.0 fL    Platelets 449 922 - 873 E9/L    MPV 9.4 7.0 - 12.0 fL    Neutrophils % 96.5 (H) 43.0 - 80.0 %    Lymphocytes % 1.8 (L) 20.0 - 42.0 %    Monocytes % 1.1 (L) 2.0 - 12.0 %    Eosinophils % 0.0 0.0 - 6.0 %    Basophils % 0.1 0.0 - 2.0 %    Neutrophils Absolute 26.36 (H) 1.80 - 7.30 E9/L    Lymphocytes Absolute 0.54 (L) 1.50 - 4.00 E9/L    Monocytes Absolute 0.00 (L) 0.10 - 0.95 E9/L    Eosinophils Absolute 0.00 (L) 0.05 - 0.50 E9/L    Basophils Absolute 0.00 0.00 - 0.20 E9/L    Metamyelocytes Relative 1.7 (H) 0.0 - 1.0 %    Toxic Granulation 1+     Vacuolated Neutrophils 1+     Poikilocytes 1+     Ovalocytes 1+     Stomatocytes 1+    Comprehensive Metabolic Panel   Result Value Ref Range    Sodium 140 132 - 146 mmol/L    Potassium 4.7 3.5 - 5.0 mmol/L    Chloride 102 98 - 107 mmol/L    CO2 23 22 - 29 mmol/L    Anion Gap 15 7 - 16 mmol/L    Glucose 125 (H) 74 - 99 mg/dL    BUN 33 (H) 6 - 23 mg/dL    Creatinine 1.3 (H) 0.5 - 1.0 mg/dL    Est, Glom Filt Rate 42 >=60 mL/min/1.73    Calcium 9.1 8.6 - 10.2 mg/dL    Total Protein 8.0 6.4 - 8.3 g/dL    Albumin 3.4 (L) 3.5 - 5.2 g/dL    Total Bilirubin 0.4 0.0 - 1.2 mg/dL    Alkaline Phosphatase 126 (H) 35 - 104 U/L    ALT 25 0 - 32 U/L    AST 48 (H) 0 - 31 U/L   Troponin   Result Value Ref Range    Troponin, High Sensitivity 39 (H) 0 - 9 ng/L   Lactate, Sepsis   Result Value Ref Range    Lactic Acid, Sepsis 2.3 (H) 0.5 - 1.9 mmol/L   Phenytoin Level, Total   Result Value Ref Range    Phenytoin Lvl 11.7 10.0 - 20.0 ug/mL    Phenytoin Dose Amount Unknown    EKG 12 Lead   Result Value Ref Range    Ventricular Rate 81 BPM    Atrial Rate 81 BPM    P-R Interval 156 ms    QRS Duration 72 ms    Q-T Interval 382 ms    QTc Calculation (Bazett) 443 ms    P Axis 62 degrees    R Axis 6 degrees    T Axis 37 degrees       RADIOLOGY:  Interpreted by Radiologist.  XR CHEST PORTABLE   Final Result   Mild diffuse interstitial opacities in the lungs may represent pulmonary   edema or atypical pneumonia. EKG: This EKG is signed and interpreted by me. Rate: 81  Rhythm: Sinus  Interpretation: no acute changes  Comparison: stable as compared to patient's most recent EKG        ------------------------- NURSING NOTES AND VITALS REVIEWED ---------------------------   The nursing notes within the ED encounter and vital signs as below have been reviewed by myself. BP (!) 119/59   Pulse 91   Temp (!) 100.5 °F (38.1 °C) (Oral)   Resp 17   Wt 222 lb (100.7 kg)   SpO2 94%   BMI 40.60 kg/m²   Oxygen Saturation Interpretation: Normal    The patients available past medical records and past encounters were reviewed. ------------------------------ ED COURSE/MEDICAL DECISION MAKING----------------------  Medications   0.9 % sodium chloride infusion ( IntraVENous New Bag 12/9/22 2139)   meropenem (MERREM) 1,000 mg in sodium chloride 0.9 % 100 mL IVPB (mini-bag) (has no administration in time range)   vancomycin 1500 mg in dextrose 5% 300 mL IVPB (has no administration in time range)   acetaminophen (TYLENOL) tablet 650 mg (650 mg Oral Given 12/9/22 2140)             Medical Decision Making:   Patient presenting here because of history of fever and having chills. Patient reportedly had seizure earlier today she did not fall patient does have history of seizure she does have history of a DVT she is on anticoagulation. Patient is also on Dilantin for her seizures. Patient reporting no chest pain or difficulty breathing. Patient reporting no abdominal pain she is awake alert here. Patient labs noted reviewed.   Patient does have significant erythema to her left lower extremity compared to right. Patient is on anticoagulation ultrasound was not ordered due to the fact the patient is not having any chest pain and she is already anticoagulated. I suspect her infection is related to her lower extremity. I believe she does have cellulitis. Patient was medicated with Tylenol was given IV antibiotics. Patient has multiple allergies she was ordered meropenem and vancomycin. Patient had been on these medications in the past.  Patient was made aware of findings and results and plan for admission. Re-Evaluations:             Patient was rechecked t here in the emergency department. Patient having no chest pain or difficulty breathing. Patient's labs were reviewed with her as well as plan. Patient will be admitted to monitored bed. Consultations:             Internal medicine    Critical Care: This patient's ED course included: a personal history and physicial eaxmination    This patient has been closely monitored during their ED course. Counseling: The emergency provider has spoken with the patient and discussed todays results, in addition to providing specific details for the plan of care and counseling regarding the diagnosis and prognosis. Questions are answered at this time and they are agreeable with the plan.       --------------------------------- IMPRESSION AND DISPOSITION ---------------------------------    IMPRESSION  1. Cellulitis of left lower extremity    2. Seizure disorder ([facility]        DISPOSITION  Disposition: Admit to monitored bed  Patient condition is stable        NOTE: This report was transcribed using voice recognition software.  Every effort was made to ensure accuracy; however, inadvertent computerized transcription errors may be present          Gregory Loza MD  12/09/22 0538

## 2022-12-10 NOTE — PROGRESS NOTES
Hospitalist Progress Note      PCP: Claudio Decker DO    Date of Admission: 12/9/2022    Chief Complaint: left leg cellulitis    Hospital Course:    Ms. Gaurav Courtney, a 76y.o. year old female  who  has a past medical history of Asthma, Autistic disorder, residual state, Cellulitis of left leg, Chronic major depressive disorder, recurrent episode ([address] Dermatophytosis, Hx of blood clots, Hyperlipidemia, Hypertension, Hypothyroidism, Lower limb ulcer, calf, left, with fat layer exposed ([address] Lymphedema of both lower extremities, Mental retardation, Obesity, Osteoarthritis, and Seizures ([address] presents with fever and chills for couple days, denies cough no chest pain no nausea or vomiting, noted to see her legs erythematous no dysuria    Subjective: Patient was seen at bedside and appears to be doing well. Mentions that she has not got much sleep in the Er over the last day. Discussed regarding antibiotics. Continues to be febrile.       Medications:  Reviewed    Infusion Medications    sodium chloride      sodium chloride 100 mL/hr at 12/10/22 1107     Scheduled Medications    apixaban  5 mg Oral BID    ARIPiprazole  2 mg Oral Nightly    atorvastatin  10 mg Oral Daily    carvedilol  25 mg Oral BID    DULoxetine  60 mg Oral Daily    famotidine  20 mg Oral Daily    hydrALAZINE  25 mg Oral BID    levETIRAcetam  750 mg Oral BID    levothyroxine  100 mcg Oral Daily    montelukast  10 mg Oral Nightly    phenytoin  100 mg Oral Daily    phenytoin  200 mg Oral Nightly    senna  2 tablet Oral Nightly    sodium chloride flush  10 mL IntraVENous 2 times per day    meropenem  1,000 mg IntraVENous Q12H    vancomycin  15 mg/kg IntraVENous Q24H     PRN Meds: albuterol, sodium chloride flush, sodium chloride, ondansetron **OR** ondansetron, magnesium hydroxide, acetaminophen **OR** acetaminophen      Intake/Output Summary (Last 24 hours) at 12/10/2022 1214  Last data filed at 12/10/2022 1123  Gross per 24 hour   Intake 360 meds  -Reviewed notes and patient reportedly had a seizure yesterday prior to arrival as well. Currently on antiseizure meds  -await labs from today  - neurology consulted for breakthrough seizure   - Seizure precautions    DVT Prophylaxis: Lovenox  Diet: ADULT DIET;  Regular  Code Status: Full Code    PT/OT Eval Status: ordered    Dispo - ongoing specialist eval, blood c/s, IV antibiotics    Bettie Mayo MD

## 2022-12-11 LAB
ALBUMIN SERPL-MCNC: 2.7 G/DL (ref 3.5–5.2)
ALP BLD-CCNC: 92 U/L (ref 35–104)
ALT SERPL-CCNC: 23 U/L (ref 0–32)
ANION GAP SERPL CALCULATED.3IONS-SCNC: 11 MMOL/L (ref 7–16)
AST SERPL-CCNC: 46 U/L (ref 0–31)
BASOPHILS ABSOLUTE: 0.01 E9/L (ref 0–0.2)
BASOPHILS RELATIVE PERCENT: 0.1 % (ref 0–2)
BILIRUB SERPL-MCNC: 0.3 MG/DL (ref 0–1.2)
BUN BLDV-MCNC: 30 MG/DL (ref 6–23)
CALCIUM SERPL-MCNC: 7.1 MG/DL (ref 8.6–10.2)
CHLORIDE BLD-SCNC: 102 MMOL/L (ref 98–107)
CO2: 21 MMOL/L (ref 22–29)
CREAT SERPL-MCNC: 1.2 MG/DL (ref 0.5–1)
EOSINOPHILS ABSOLUTE: 0.18 E9/L (ref 0.05–0.5)
EOSINOPHILS RELATIVE PERCENT: 1.7 % (ref 0–6)
GFR SERPL CREATININE-BSD FRML MDRD: 46 ML/MIN/1.73
GLUCOSE BLD-MCNC: 107 MG/DL (ref 74–99)
HCT VFR BLD CALC: 27.4 % (ref 34–48)
HEMOGLOBIN: 8.7 G/DL (ref 11.5–15.5)
IMMATURE GRANULOCYTES #: 0.06 E9/L
IMMATURE GRANULOCYTES %: 0.6 % (ref 0–5)
LYMPHOCYTES ABSOLUTE: 0.61 E9/L (ref 1.5–4)
LYMPHOCYTES RELATIVE PERCENT: 5.6 % (ref 20–42)
MCH RBC QN AUTO: 31.4 PG (ref 26–35)
MCHC RBC AUTO-ENTMCNC: 31.8 % (ref 32–34.5)
MCV RBC AUTO: 98.9 FL (ref 80–99.9)
MONOCYTES ABSOLUTE: 0.49 E9/L (ref 0.1–0.95)
MONOCYTES RELATIVE PERCENT: 4.5 % (ref 2–12)
NEUTROPHILS ABSOLUTE: 9.54 E9/L (ref 1.8–7.3)
NEUTROPHILS RELATIVE PERCENT: 87.5 % (ref 43–80)
PDW BLD-RTO: 14.1 FL (ref 11.5–15)
PLATELET # BLD: 192 E9/L (ref 130–450)
PMV BLD AUTO: 9.5 FL (ref 7–12)
POTASSIUM REFLEX MAGNESIUM: 3.6 MMOL/L (ref 3.5–5)
RBC # BLD: 2.77 E12/L (ref 3.5–5.5)
SODIUM BLD-SCNC: 134 MMOL/L (ref 132–146)
TOTAL PROTEIN: 6.4 G/DL (ref 6.4–8.3)
VANCOMYCIN RANDOM: 27.9 MCG/ML (ref 5–40)
WBC # BLD: 10.9 E9/L (ref 4.5–11.5)

## 2022-12-11 PROCEDURE — 6360000002 HC RX W HCPCS: Performed by: INTERNAL MEDICINE

## 2022-12-11 PROCEDURE — 80185 ASSAY OF PHENYTOIN TOTAL: CPT

## 2022-12-11 PROCEDURE — 97535 SELF CARE MNGMENT TRAINING: CPT

## 2022-12-11 PROCEDURE — 2140000000 HC CCU INTERMEDIATE R&B

## 2022-12-11 PROCEDURE — 80202 ASSAY OF VANCOMYCIN: CPT

## 2022-12-11 PROCEDURE — 80053 COMPREHEN METABOLIC PANEL: CPT

## 2022-12-11 PROCEDURE — 2580000003 HC RX 258: Performed by: INTERNAL MEDICINE

## 2022-12-11 PROCEDURE — 36415 COLL VENOUS BLD VENIPUNCTURE: CPT

## 2022-12-11 PROCEDURE — 87040 BLOOD CULTURE FOR BACTERIA: CPT

## 2022-12-11 PROCEDURE — 85025 COMPLETE CBC W/AUTO DIFF WBC: CPT

## 2022-12-11 PROCEDURE — 80186 ASSAY OF PHENYTOIN FREE: CPT

## 2022-12-11 PROCEDURE — 6370000000 HC RX 637 (ALT 250 FOR IP): Performed by: INTERNAL MEDICINE

## 2022-12-11 PROCEDURE — 97166 OT EVAL MOD COMPLEX 45 MIN: CPT

## 2022-12-11 RX ORDER — DIPHENHYDRAMINE HYDROCHLORIDE 50 MG/ML
12.5 INJECTION INTRAMUSCULAR; INTRAVENOUS EVERY 6 HOURS PRN
Status: DISCONTINUED | OUTPATIENT
Start: 2022-12-11 | End: 2022-12-14 | Stop reason: HOSPADM

## 2022-12-11 RX ORDER — BISACODYL 10 MG
10 SUPPOSITORY, RECTAL RECTAL DAILY
Status: DISCONTINUED | OUTPATIENT
Start: 2022-12-11 | End: 2022-12-14 | Stop reason: HOSPADM

## 2022-12-11 RX ORDER — METRONIDAZOLE 500 MG/100ML
500 INJECTION, SOLUTION INTRAVENOUS EVERY 8 HOURS
Status: DISCONTINUED | OUTPATIENT
Start: 2022-12-11 | End: 2022-12-11

## 2022-12-11 RX ADMIN — APIXABAN 5 MG: 5 TABLET, FILM COATED ORAL at 20:42

## 2022-12-11 RX ADMIN — HYDRALAZINE HYDROCHLORIDE 25 MG: 25 TABLET, FILM COATED ORAL at 20:42

## 2022-12-11 RX ADMIN — LEVETIRACETAM 750 MG: 500 TABLET, FILM COATED ORAL at 09:20

## 2022-12-11 RX ADMIN — AMPICILLIN SODIUM AND SULBACTAM SODIUM 3000 MG: 2; 1 INJECTION, POWDER, FOR SOLUTION INTRAMUSCULAR; INTRAVENOUS at 20:41

## 2022-12-11 RX ADMIN — ARIPIPRAZOLE 2 MG: 2 TABLET ORAL at 20:42

## 2022-12-11 RX ADMIN — VANCOMYCIN HYDROCHLORIDE 1500 MG: 10 INJECTION, POWDER, LYOPHILIZED, FOR SOLUTION INTRAVENOUS at 02:31

## 2022-12-11 RX ADMIN — CARVEDILOL 25 MG: 25 TABLET, FILM COATED ORAL at 09:20

## 2022-12-11 RX ADMIN — HYDRALAZINE HYDROCHLORIDE 25 MG: 25 TABLET, FILM COATED ORAL at 09:21

## 2022-12-11 RX ADMIN — DULOXETINE HYDROCHLORIDE 60 MG: 30 CAPSULE, DELAYED RELEASE ORAL at 09:20

## 2022-12-11 RX ADMIN — SODIUM CHLORIDE, PRESERVATIVE FREE 10 ML: 5 INJECTION INTRAVENOUS at 20:58

## 2022-12-11 RX ADMIN — AMPICILLIN SODIUM AND SULBACTAM SODIUM 3000 MG: 2; 1 INJECTION, POWDER, FOR SOLUTION INTRAMUSCULAR; INTRAVENOUS at 15:20

## 2022-12-11 RX ADMIN — FAMOTIDINE 20 MG: 20 TABLET, FILM COATED ORAL at 09:20

## 2022-12-11 RX ADMIN — ATORVASTATIN CALCIUM 10 MG: 10 TABLET, FILM COATED ORAL at 09:20

## 2022-12-11 RX ADMIN — LEVOTHYROXINE SODIUM 100 MCG: 0.1 TABLET ORAL at 09:20

## 2022-12-11 RX ADMIN — MONTELUKAST 10 MG: 10 TABLET, FILM COATED ORAL at 20:42

## 2022-12-11 RX ADMIN — SENNOSIDES 17.2 MG: 8.6 TABLET, FILM COATED ORAL at 20:42

## 2022-12-11 RX ADMIN — PHENYTOIN SODIUM 100 MG: 100 CAPSULE ORAL at 09:28

## 2022-12-11 RX ADMIN — APIXABAN 5 MG: 5 TABLET, FILM COATED ORAL at 09:20

## 2022-12-11 RX ADMIN — CARVEDILOL 25 MG: 25 TABLET, FILM COATED ORAL at 20:42

## 2022-12-11 RX ADMIN — LEVETIRACETAM 750 MG: 500 TABLET, FILM COATED ORAL at 20:42

## 2022-12-11 NOTE — PROGRESS NOTES
[address]      Date:2022                                                  Patient Name: Floresita Swanson  MRN: [id number]  : 1945  Room: [address] [place] [address]    Evaluating OT: SONIA Stein, OTR/L [id number]  Referring Sherice West MD  Specific Provider Orders: OT eval and treat 12/10  Recommended Adaptive Equipment:  TBD     Diagnosis: LLE cellulitis   Surgery: none   Pertinent Medical History: asthma, HTN, HLD, obesity, MR   Precautions:  Fall Risk, seizure    Assessment of current deficits   [x] Functional mobility  [x]ADLs  [x] Strength               [x]Cognition   [x] Functional transfers   [x] IADLs         [x] Safety Awareness   [x]Endurance   [] Fine Coordination              [x] Balance      [] Vision/perception   [x]Sensation    []Gross Motor Coordination  [] ROM  [] Delirium                   [] Motor Control     OT PLAN OF CARE   OT POC based on physician orders, patient diagnosis and results of clinical assessment    Frequency/Duration: 2-3 days/wk for 2 weeks PRN   Specific OT Treatment to include:   * Instruction/training on adapted ADL techniques and AE recommendations to increase functional independence within precautions       * Training on energy conservation strategies, correct breathing pattern and techniques to improve independence/tolerance for self-care routine  * Functional transfer/mobility training/DME recommendations for increased independence, safety, and fall prevention  * Patient/Family education to increase follow through with safety techniques and functional independence  * Recommendation of environmental modifications for increased safety with functional transfers/mobility and ADLs  * Therapeutic exercise to improve motor endurance, ROM, and functional strength for ADLs/functional transfers  * Therapeutic activities to facilitate/challenge dynamic balance, stand tolerance for increased safety and independence with ADLs  * Neuro-muscular re-education: facilitation of righting/equilibrium reactions, midline orientation, scapular stability/mobility, normalization of muscle tone, and facilitation of volitional active controled movement    Home Living: Pt presents from [facility]. Pt required assist with ADLs and completed SPTs. Pain Level: 0/10  Cognition: A&O: 3/4; Follows 1 step directions, with repetition and increased time   Memory:  good    Sequencing:  fair    Problem solving:  fair    Judgement/safety:  fair     Functional Assessment:  AM-PAC Daily Activity Raw Score: 11/24   Initial Eval Status  Date: 12/11/22 Treatment Status  Date: STGs=LTGs  Time Frame: 10-14 days   Feeding SUP   IND   Grooming SBA (seated)   IND   UB Dressing Mod A   Min A   LB Dressing Dep (assist with socks)  Max A    Bathing Mod x 2  Max A    Toileting Mod x 2  Max A   Bed Mobility  Log roll: Max A  Supine to sit: Max A   Sit to supine: NT   Log roll Min A  Supine to sit: Min A   Sit to supine: Min A   Functional Transfers Sit to stand: Max A   Stand to sit: Max A  Stand pivot: Mod x 2  Commode: NT  Mod A   Functional Mobility nt  Pt does not ambulate   Balance Sitting: SBA  Standing: Mod x 2     Activity Tolerance fair     Visual/  Perceptual Glasses: yes                UE ROM: BUE: elbow flex WFL, shoulder flex grossly 90'  Strength: RUE: grossly 3/5 LUE: grossly 3/5   Strength: B WFL  Fine Motor Coordination:  WFL     Hearing: WFL  Sensation:  No c/o numbness/tingling   Tone:  WFL  Edema: BLEs                            Comments:Cleared by RN to see pt. Upon arrival, patient supine in bed and agreeable to OT session. At end of session, patient sitting in chair with call light and phone within reach, all lines and tubes intact. Pt would benefit from continued OT to increase functional independence and quality of life.     Treatment: Pt required vc's and physical assist for proper technique/safety with hand placement/body mechanics/posture for bed mobility/ADLs/functional tranfers/ww management. Pt required vc's for sequencing/initiation of ADLs/functional transfers. Pt able to  sit EOB ~10 mins to increase core strength/balance/activity tolerance for ease with ADLs. Pt required increased time to complete ADLs/functional transfers due to management of multiple lines, rest breaks, and physical assist.Pt appeared to have tolerated session well and appears motivated/cooperative/pleasant . Pt instructed on use of call light for assistance and fall prevention. Pt demo'ing fair understanding of education provided. Continue to educate. Eval Complexity: moderate  History: Expanded chart review of medical records and additional review of physical, cognitive, or psychosocial history related to current functional performance  Exam: 3+ performance deficits  Assistance/Modification: mod/max assistance or modifications required to perform tasks. May have comorbidities that affect occupational performance. Rehab Potential: Good for established goals, pt. assisted in establishment of goals. LTG: maximize independence with ADLs to return to PLOF    Patient instructed on diagnosis, prognosis/goals and plan of care. Demonstrated fair understanding. [] Malnutrition indicators have been identified and nursing has been notified to ensure a dietitian consult is ordered. Evaluation time includes thorough review of current medical information, gathering information on past medical & social history & PLOF, completion of standardized testing, informal observation of tasks, consultation with other medical professions/disciplines, assessment of data & development of POC/goals.      Time In: 1010       Time Out: 1035     Total treatment time: 15       Treatment Charges: Mins Units   OT Eval Low 35292     OT Eval Medium 64367 X    OT Eval High 79576     OT Re-Eval [id number]     Ther Ex  05075       Manual Therapy [address]       ADL/Home Mgt 61922 15 1   Neuro Re-ed [address] manage/training  [id number]       Non-Billable Time           Deann Samayoa OTR/L [id number]

## 2022-12-11 NOTE — PROGRESS NOTES
Pharmacy Consultation Note  (Antibiotic Dosing and Monitoring)    Initial consult date: 12/10/22  Consulting physician/provider: Mila Thurman  Drug: Vancomycin  Indication: Cellulitis    Age/  Gender Height Weight IBW  Allergy Information   77 y.o./female 5' (152.4 cm) 222 lb (100.7 kg)     Ideal body weight: 45.5 kg (100 lb 4.9 oz)  Adjusted ideal body weight: 67.6 kg (148 lb 15.8 oz)   Latex, Cephalosporins, Clindamycin/lincomycin, Duoderm hydroactive [hydroactive dressings], Pcn [penicillins], and Tape [adhesive tape]      Renal Function:  Recent Labs     12/09/22  2041 12/10/22  1333 12/11/22  0649   BUN 33* 36* 30*   CREATININE 1.3* 1.5* 1.2*         Intake/Output Summary (Last 24 hours) at 12/11/2022 1047  Last data filed at 12/10/2022 1529  Gross per 24 hour   Intake 480 ml   Output --   Net 480 ml         Vancomycin Monitoring:  Trough:  No results for input(s): VANCOTROUGH in the last 72 hours.   Random:    Recent Labs     12/11/22  0649   VANCORANDOM 27.9       Recent Labs     12/09/22 2041   BLOODCULT2 Gram stain performed from blood culture bottle media  Gram positive cocci in chains  *  Sensitivity to follow          Historical Cultures:  Organism   Date Value Ref Range Status   12/09/2022 Strep pyogenes (Beta Strep Group A) (A)  Preliminary   12/09/2022 Strep pyogenes (Beta Strep Group A) (A)  Preliminary     Recent Labs     12/09/22 2041   BC Gram stain performed from blood culture bottle media  Gram positive cocci in chains  *  Refer to previous culture of CXBL from 12-9-22 @ 2041 for susceptibility  results         Vancomycin Administration Times:    Recent vancomycin administrations                     vancomycin 1500 mg in dextrose 5% 300 mL IVPB (mg) 1,500 mg New Bag 12/11/22 0231    vancomycin 1500 mg in dextrose 5% 300 mL IVPB (mg) 1,500 mg New Bag 12/10/22 0034                  Assessment:  Patient is a 76 y.o. female who has been initiated on vancomycin  Estimated Creatinine Clearance: 41 mL/min (A) (based on SCr of 1.2 mg/dL (H)).   To dose vancomycin, pharmacy will be utilizing [employer] calculation software for goal AUC/SHER 400-600 mg/L-hr  12/10: Scr 1.3  12/11: Scr 1.2, random level is 27.9 mcg/mL (~4 hours post dose)    Plan:  Decrease vancomycin to 750 mg IV every 24 hours  Random level will be collected as needed to appropriately monitor   Will continue to monitor renal function   Pharmacy to follow      Abbey Pabon PharmD, BCPS, BCCCP 12/11/2022 10:47 AM

## 2022-12-11 NOTE — CONSULTS
[address] [place] [address] [facility]    Consultation Note     Admit Date: 12/9/2022  7:58 PM    Reason for Consult:   Left leg cellulitis    Attending Physician:  Roula Lazo MD     Chief Complaint: Pain in the left leg. HISTORY OF PRESENT ILLNESS:   76y.o. year old female  who  has a past medical history of Asthma, Autistic disorder, residual state, Cellulitis of left leg, Chronic major depressive disorder, recurrent episode ([address] Dermatophytosis, Hx of blood clots, Hyperlipidemia, Hypertension, Hypothyroidism, Lower limb ulcer, calf, left, with fat layer exposed ([address] Lymphedema of both lower extremities, Mental retardation, Obesity, Osteoarthritis, and Seizures ([address] presents with fever and chills for couple days. Blood cultures grew strep pyogenous. Patient was started on meropenem and vancomycin considering allergies. ID consulted for left leg cellulitis.     Past Medical History:        Diagnosis Date    Asthma     Autistic disorder, residual state     Cellulitis of left leg 8/7/2017    Chronic major depressive disorder, recurrent episode ([address]     Dermatophytosis 8/7/2017    Hx of blood clots     Hyperlipidemia     Hypertension     Hypothyroidism     Lower limb ulcer, calf, left, with fat layer exposed ([address] 1/20/2020    Lymphedema of both lower extremities 8/7/2017    Mental retardation     mild    Obesity     Osteoarthritis     Seizures ([address]     none recent as of 9/18     Past Surgical History:        Procedure Laterality Date    APPENDECTOMY  1969    NE CORNEAL TRANSPLANT,ENDOTHELIAL Left 9/14/2018    LEFT EYE CATARACT EXTRACTION IOL IMPLANT  ++LATEX ALLERGY++ performed by Tamar Chávez MD at [address] [place] [address] W/O ECP Right 10/12/2018    RIGHT EYE CATARACT EXTRACTION IOL IMPLANT  ++LATEX ALLERGY++ performed by Tamar Chávez MD at [place] [address]  age 8     Current Medications:   Scheduled Meds:   bisacodyl  10 mg Rectal Daily ampicillin-sulbactam  3,000 mg IntraVENous Q6H    apixaban  5 mg Oral BID    ARIPiprazole  2 mg Oral Nightly    atorvastatin  10 mg Oral Daily    carvedilol  25 mg Oral BID    DULoxetine  60 mg Oral Daily    famotidine  20 mg Oral Daily    hydrALAZINE  25 mg Oral BID    levETIRAcetam  750 mg Oral BID    levothyroxine  100 mcg Oral Daily    montelukast  10 mg Oral Nightly    phenytoin  100 mg Oral Daily    phenytoin  200 mg Oral Nightly    senna  2 tablet Oral Nightly    sodium chloride flush  10 mL IntraVENous 2 times per day     Continuous Infusions:   sodium chloride      sodium chloride 100 mL/hr at 12/10/22 1107     PRN Meds:diphenhydrAMINE, albuterol, sodium chloride flush, sodium chloride, ondansetron **OR** ondansetron, magnesium hydroxide, acetaminophen **OR** acetaminophen    Allergies:  Latex, Cephalosporins, Clindamycin/lincomycin, Duoderm hydroactive [hydroactive dressings], Tape [adhesive tape], and Pcn [penicillins]    Social History:   Social History     Socioeconomic History    Marital status: Single     Spouse name: None    Number of children: None    Years of education: None    Highest education level: None   Tobacco Use    Smoking status: Never    Smokeless tobacco: Never   Vaping Use    Vaping Use: Never used   Substance and Sexual Activity    Alcohol use: No    Drug use: No    Sexual activity: Never     Tobacco: No  Alcohol: No  Pets: No  Travel: No    Family History:   History reviewed. No pertinent family history. . Otherwise non-pertinent to the chief complaint. REVIEW OF SYSTEMS:    CONSTITUTIONAL:  No chills, fevers or night sweats. No loss of weight. EYES:  No double vision or drainage from eyes, ears or throat. HEENT:  No neck stiffness. No dysphagia. No drainage from eyes, ears or throat  RESPIRATORY:  No cough, productive sputum or hemoptysis. CARDIOVASCULAR:  No chest pain, palpitations, orthopnea or dyspnea on exertion.   GASTROINTESTINAL:  No nausea, vomiting, diarrhea or constipation or hematochezia   GENITOURINARY:  No frequency burning dysuria or hematuria. INTEGUMENT/BREAST:  No rash or breast masses. HEMATOLOGIC/LYMPHATIC:  No lymphadenopathy or blood dyscrasics. ALLERGIC/IMMUNOLOGIC:  No anaphylaxis. ENDOCRINE:  No polyuria or polydipsia or temperature intolerance. MUSCULOSKELETAL:  No myalgia or arthralgia. Full ROM. NEUROLOGICAL:  No focal motor sensory deficit. BEHAVIOR/PSYCH:  No psychosis. PHYSICAL EXAM:    Vitals:    BP (!) 144/64   Pulse 73   Temp 97.7 °F (36.5 °C) (Oral)   Resp 16   Ht 5' (1.524 m)   Wt 222 lb (100.7 kg)   SpO2 96%   BMI 43.36 kg/m²   Constitutional: The patient is awake, alert, and oriented. Skin: Warm and dry. No rashes were noted. No jaundice. HEENT: Eyes show round, and reactive pupils. Moist mucous membranes, no ulcerations, no thrush. Neck: Supple to movements. No lymphadenopathy. Chest: No use of accessory muscles to breathe. Symmetrical expansion. Auscultation reveals no wheezing, crackles, or rhonchi. Cardiovascular: S1 and S2 are rhythmic and regular. No murmurs appreciated. Abdomen: Positive bowel sounds to auscultation. Benign to palpation. No masses felt. No hepatosplenomegaly. Genitourinary: Deferred  Extremities: No clubbing, no cyanosis, no edema. Musculoskeletal: Painful weeping skin lesions noted in the left leg. Neurological: Following commands, no focal neurodeficit.   Lines: peripheral      CBC+dif:  Recent Labs     12/09/22  2041 12/10/22  1333 12/11/22  0649   WBC 26.9* 17.5* 10.9   HGB 10.4* 9.2* 8.7*   HCT 32.5* 28.5* 27.4*   MCV 97.3 97.3 98.9    225 192   NEUTROABS 26.36* 16.31* 9.54*     Lab Results   Component Value Date    CRP 16.5 (H) 12/10/2022    CRP 8.0 (H) 09/21/2022    CRP 15.2 (H) 12/08/2019     No results found for: CRP  Lab Results   Component Value Date    SEDRATE 80 (H) 12/10/2022    SEDRATE 106 (H) 09/21/2022    SEDRATE 105 (H) 12/08/2019     Lab Results   Component Value Date    ALT 23 12/11/2022    AST 46 (H) 12/11/2022    ALKPHOS 92 12/11/2022    BILITOT 0.3 12/11/2022     Lab Results   Component Value Date/Time     12/11/2022 06:49 AM    K 3.6 12/11/2022 06:49 AM     12/11/2022 06:49 AM    CO2 21 12/11/2022 06:49 AM    BUN 30 12/11/2022 06:49 AM    CREATININE 1.2 12/11/2022 06:49 AM    GFRAA >60 09/24/2022 07:03 AM    LABGLOM 46 12/11/2022 06:49 AM    GLUCOSE 107 12/11/2022 06:49 AM    PROT 6.4 12/11/2022 06:49 AM    LABALBU 2.7 12/11/2022 06:49 AM    CALCIUM 7.1 12/11/2022 06:49 AM    BILITOT 0.3 12/11/2022 06:49 AM    ALKPHOS 92 12/11/2022 06:49 AM    AST 46 12/11/2022 06:49 AM    ALT 23 12/11/2022 06:49 AM       No results found for: PROTIME, INR    Lab Results   Component Value Date/Time    TSH 0.177 09/22/2022 06:21 AM       Lab Results   Component Value Date/Time    COLORU Yellow 12/22/2020 05:28 PM    PHUR 8.0 12/22/2020 05:28 PM    LABCAST FEW 12/05/2019 02:28 PM    WBCUA 5-10 12/22/2020 05:28 PM    RBCUA 1-3 12/22/2020 05:28 PM    BACTERIA MANY 12/22/2020 05:28 PM    CLARITYU CLOUDY 12/22/2020 05:28 PM    SPECGRAV 1.015 12/22/2020 05:28 PM    LEUKOCYTESUR TRACE 12/22/2020 05:28 PM    UROBILINOGEN 1.0 12/22/2020 05:28 PM    BILIRUBINUR Negative 12/22/2020 05:28 PM    BLOODU SMALL 12/22/2020 05:28 PM    GLUCOSEU Negative 12/22/2020 05:28 PM    AMORPHOUS FEW 12/22/2020 05:28 PM       No results found for: UAD4RAE, BEART, U8SPXUBB, PHART, THGBART, CZN3EAY, PO2ART, MOL8FDE  Radiology:  XR CHEST PORTABLE   Final Result   Mild diffuse interstitial opacities in the lungs may represent pulmonary   edema or atypical pneumonia.              Microbiology:  Pending  Recent Labs     12/09/22 2041   BC Gram stain performed from blood culture bottle media  Gram positive cocci in chains  *  Refer to previous culture of CXBL from 12-9-22 @ 2041 for susceptibility  results       Recent Labs     12/09/22 2041   [facility] [place] (Beta Strep Group A[place]  Strep pyogenes (Beta Strep Group A)*     Recent Labs     12/09/22 2041   BLOODCULT2 Gram stain performed from blood culture bottle media  Gram positive cocci in chains  *  Sensitivity to follow     No results for input(s): STREPNEUMAGU in the last 72 hours. No results for input(s): LP1UAG in the last 72 hours. No results for input(s): ASO in the last 72 hours. No results for input(s): CULTRESP in the last 72 hours. Assessment:  Left leg cellulitis  Strep pyogenes bacteremia  Penicillin and cephalosporin allergy    Plan:    Discussed with patient. Patient reports that she is allergic to penicillin but does not know what kind of allergy. Later she said that she had a rash. DC meropenem and vancomycin. Start Unasyn with as needed Benadryl. Monitor Unasyn first dose for any allergic reaction. Repeat blood cultures. Likely source of bacteremia is left leg cellulitis  Will continue to follow. Thank you for having us see this patient in consultation. I will be discussing this case with the treating physicians.       Electronically signed by Dieter Clark MD on 12/11/2022 at 2:48 PM

## 2022-12-11 NOTE — PLAN OF CARE
Notes reviewed. No issues overnight or today. Vital signs at present time are stable on room air    Patient has had epilepsy since age 13 with aura seizures. Unable to recall her last seizure prior to this event in the setting of infection. Patient states she had GTC and [place] seizures in the past.  Patient has had good control with phenytoin. Assessment:  Free phenytoin level ordered this morning however not drawn yet. Normal total phenytoin and low albumin in the setting of cellulitis and bacteremia most likely lower patient's seizure threshold with subsequent breakthrough seizure. Plan:  Continue supportive care  Continue treatment of other medical issues including infections  Draw free phenytoin level  Continue Keppra 500 mg twice daily  Continue phenytoin 100 mg daily and 200 mg nightly  Seizure precautions      Neurology will sign off. Please call with additional seizure activity or concerns.

## 2022-12-11 NOTE — PROGRESS NOTES
Hospitalist Progress Note      PCP: Tyler Mcelroy DO    Date of Admission: 12/9/2022    Chief Complaint: left leg cellulitis    Hospital Course:    Ms. Vera Hoffman, a 76y.o. year old female  who  has a past medical history of Asthma, Autistic disorder, residual state, Cellulitis of left leg, Chronic major depressive disorder, recurrent episode ([address] Dermatophytosis, Hx of blood clots, Hyperlipidemia, Hypertension, Hypothyroidism, Lower limb ulcer, calf, left, with fat layer exposed ([address] Lymphedema of both lower extremities, Mental retardation, Obesity, Osteoarthritis, and Seizures ([address] presents with fever and chills for couple days, denies cough no chest pain no nausea or vomiting, noted to see her legs erythematous no dysuria    Subjective: Patient was seen at bedside and appears to be doing well. Complains of constipation, abdominal pain. Discussed regarding adding a bowel regimen. Continue antibiotics.   Afebrile in the last 24 hours      Medications:  Reviewed    Infusion Medications    sodium chloride      sodium chloride 100 mL/hr at 12/10/22 1107     Scheduled Medications    apixaban  5 mg Oral BID    ARIPiprazole  2 mg Oral Nightly    atorvastatin  10 mg Oral Daily    carvedilol  25 mg Oral BID    DULoxetine  60 mg Oral Daily    famotidine  20 mg Oral Daily    hydrALAZINE  25 mg Oral BID    levETIRAcetam  750 mg Oral BID    levothyroxine  100 mcg Oral Daily    montelukast  10 mg Oral Nightly    phenytoin  100 mg Oral Daily    phenytoin  200 mg Oral Nightly    senna  2 tablet Oral Nightly    sodium chloride flush  10 mL IntraVENous 2 times per day    meropenem  1,000 mg IntraVENous Q12H    vancomycin  15 mg/kg IntraVENous Q24H     PRN Meds: albuterol, sodium chloride flush, sodium chloride, ondansetron **OR** ondansetron, magnesium hydroxide, acetaminophen **OR** acetaminophen      Intake/Output Summary (Last 24 hours) at 12/11/2022 1045  Last data filed at 12/10/2022 1529  Gross per 24 hour Intake 480 ml   Output --   Net 480 ml       Exam:    BP (!) 144/64   Pulse 73   Temp 97.7 °F (36.5 °C) (Oral)   Resp 16   Ht 5' (1.524 m)   Wt 222 lb (100.7 kg)   SpO2 96%   BMI 43.36 kg/m²     General appearance: No apparent distress, appears stated age and cooperative. HEENT: Pupils equal, round, and reactive to light. Conjunctivae/corneas clear. Neck: Supple, with full range of motion. No jugular venous distention. Trachea midline. Respiratory:  Normal respiratory effort. Clear to auscultation, bilaterally without Rales/Wheezes/Rhonchi. Cardiovascular: Regular rate and rhythm with normal S1/S2 without murmurs, rubs or gallops. Abdomen: Soft, non-tender, non-distended with normal bowel sounds. Musculoskeletal: No clubbing, cyanosis or edema bilaterally. Full range of motion without deformity. Skin: Left lower extremity-calf edema, erythema, warmth, tender until the knee-multiple wounds,  right lower extremity-erythema also noted on the left leg  Neurologic: No new focal deficits    Labs:   Recent Labs     12/09/22  2041 12/10/22  1333 12/11/22  0649   WBC 26.9* 17.5* 10.9   HGB 10.4* 9.2* 8.7*   HCT 32.5* 28.5* 27.4*    225 192     Recent Labs     12/09/22  2041 12/10/22  1333 12/11/22  0649    135 134   K 4.7 4.1 3.6    100 102   CO2 23 24 21*   BUN 33* 36* 30*   CREATININE 1.3* 1.5* 1.2*   CALCIUM 9.1 8.2* 7.1*     Recent Labs     12/09/22  2041 12/10/22  1333 12/11/22  0649   AST 48* 62* 46*   ALT 25 28 23   BILITOT 0.4 0.5 0.3   ALKPHOS 126* 107* 92     No results for input(s): INR in the last 72 hours. No results for input(s): Santos Squires in the last 72 hours.     Assessment/Plan:    Active Hospital Problems    Diagnosis Date Noted    Left leg cellulitis [G23.701] 12/09/2022     Priority: Medium    Leg swelling [M79.89] 08/07/2017   SHAWN on CKD  Leukocytosis  Lactic acidosis-resolved  seizure disorder  Hypertension  Hyperlipidemia  Hypothyroidism    -Continue on vancomycin, meropenem which were started in the ER  -Blood cultures reviewed and showed to be positive for Streptococcus group A. May be able to DC meropenem. Repeat blood cultures ordered  -Infectious disease consulted  -Wound cultures ordered, wound care consulted  -Continue home meds  -Reviewed notes and patient reportedly had a seizure yesterday prior to arrival as well. Currently on antiseizure meds  - neurology consulted for breakthrough seizure-Keppra added 500 mg twice daily, possibly secondary to infection due to decreased seizure threshold  - Seizure precautions    DVT Prophylaxis: Lovenox  Diet: ADULT DIET;  Regular  Code Status: Full Code    PT/OT Eval Status: ordered    Dispo - ongoing specialist eval, blood c/s, IV antibiotics    Sherin Bryant MD

## 2022-12-11 NOTE — PLAN OF CARE
Problem: Discharge Planning  Goal: Discharge to home or other facility with appropriate resources  12/11/2022 1013 by Myah Sheikh RN  Outcome: Progressing  12/11/2022 0628 by Nik Carranza RN  Outcome: Progressing     Problem: Safety - Adult  Goal: Free from fall injury  12/11/2022 1013 by Myah Sheikh RN  Outcome: Progressing  12/11/2022 0628 by Nik Carranza RN  Outcome: Progressing     Problem: Infection - Adult  Goal: Absence of infection at discharge  12/11/2022 1013 by Myah Sheikh RN  Outcome: Progressing  12/11/2022 0628 by Nik Carranza RN  Outcome: Progressing

## 2022-12-11 NOTE — PLAN OF CARE
Problem: Safety - Adult  Goal: Free from fall injury  Outcome: Progressing     Problem: Infection - Adult  Goal: Absence of infection at discharge  Outcome: Progressing

## 2022-12-12 LAB
ALBUMIN SERPL-MCNC: 2.8 G/DL (ref 3.5–5.2)
ALP BLD-CCNC: 95 U/L (ref 35–104)
ALT SERPL-CCNC: 27 U/L (ref 0–32)
ANION GAP SERPL CALCULATED.3IONS-SCNC: 11 MMOL/L (ref 7–16)
AST SERPL-CCNC: 46 U/L (ref 0–31)
BASOPHILS ABSOLUTE: 0.01 E9/L (ref 0–0.2)
BASOPHILS RELATIVE PERCENT: 0.2 % (ref 0–2)
BILIRUB SERPL-MCNC: 0.2 MG/DL (ref 0–1.2)
BUN BLDV-MCNC: 24 MG/DL (ref 6–23)
CALCIUM SERPL-MCNC: 8.2 MG/DL (ref 8.6–10.2)
CHLORIDE BLD-SCNC: 105 MMOL/L (ref 98–107)
CO2: 20 MMOL/L (ref 22–29)
CREAT SERPL-MCNC: 1.1 MG/DL (ref 0.5–1)
EKG ATRIAL RATE: 81 BPM
EKG P AXIS: 62 DEGREES
EKG P-R INTERVAL: 156 MS
EKG Q-T INTERVAL: 382 MS
EKG QRS DURATION: 72 MS
EKG QTC CALCULATION (BAZETT): 443 MS
EKG R AXIS: 6 DEGREES
EKG T AXIS: 37 DEGREES
EKG VENTRICULAR RATE: 81 BPM
EOSINOPHILS ABSOLUTE: 0.3 E9/L (ref 0.05–0.5)
EOSINOPHILS RELATIVE PERCENT: 4.7 % (ref 0–6)
GFR SERPL CREATININE-BSD FRML MDRD: 51 ML/MIN/1.73
GLUCOSE BLD-MCNC: 110 MG/DL (ref 74–99)
HCT VFR BLD CALC: 27.9 % (ref 34–48)
HEMOGLOBIN: 8.8 G/DL (ref 11.5–15.5)
IMMATURE GRANULOCYTES #: 0.02 E9/L
IMMATURE GRANULOCYTES %: 0.3 % (ref 0–5)
LYMPHOCYTES ABSOLUTE: 0.81 E9/L (ref 1.5–4)
LYMPHOCYTES RELATIVE PERCENT: 12.7 % (ref 20–42)
MCH RBC QN AUTO: 30.9 PG (ref 26–35)
MCHC RBC AUTO-ENTMCNC: 31.5 % (ref 32–34.5)
MCV RBC AUTO: 97.9 FL (ref 80–99.9)
MONOCYTES ABSOLUTE: 0.52 E9/L (ref 0.1–0.95)
MONOCYTES RELATIVE PERCENT: 8.1 % (ref 2–12)
NEUTROPHILS ABSOLUTE: 4.73 E9/L (ref 1.8–7.3)
NEUTROPHILS RELATIVE PERCENT: 74 % (ref 43–80)
ORGANISM: ABNORMAL
PDW BLD-RTO: 13.7 FL (ref 11.5–15)
PLATELET # BLD: 220 E9/L (ref 130–450)
PMV BLD AUTO: 9.5 FL (ref 7–12)
POTASSIUM REFLEX MAGNESIUM: 4 MMOL/L (ref 3.5–5)
RBC # BLD: 2.85 E12/L (ref 3.5–5.5)
SODIUM BLD-SCNC: 136 MMOL/L (ref 132–146)
TOTAL PROTEIN: 6.9 G/DL (ref 6.4–8.3)
WBC # BLD: 6.4 E9/L (ref 4.5–11.5)

## 2022-12-12 PROCEDURE — 80053 COMPREHEN METABOLIC PANEL: CPT

## 2022-12-12 PROCEDURE — 93010 ELECTROCARDIOGRAM REPORT: CPT | Performed by: INTERNAL MEDICINE

## 2022-12-12 PROCEDURE — 85025 COMPLETE CBC W/AUTO DIFF WBC: CPT

## 2022-12-12 PROCEDURE — 2580000003 HC RX 258: Performed by: INTERNAL MEDICINE

## 2022-12-12 PROCEDURE — 2140000000 HC CCU INTERMEDIATE R&B

## 2022-12-12 PROCEDURE — 1200000000 HC SEMI PRIVATE

## 2022-12-12 PROCEDURE — 6360000002 HC RX W HCPCS: Performed by: INTERNAL MEDICINE

## 2022-12-12 PROCEDURE — 6370000000 HC RX 637 (ALT 250 FOR IP): Performed by: INTERNAL MEDICINE

## 2022-12-12 PROCEDURE — 36415 COLL VENOUS BLD VENIPUNCTURE: CPT

## 2022-12-12 RX ADMIN — AMPICILLIN SODIUM AND SULBACTAM SODIUM 3000 MG: 2; 1 INJECTION, POWDER, FOR SOLUTION INTRAMUSCULAR; INTRAVENOUS at 13:42

## 2022-12-12 RX ADMIN — AMPICILLIN SODIUM AND SULBACTAM SODIUM 3000 MG: 2; 1 INJECTION, POWDER, FOR SOLUTION INTRAMUSCULAR; INTRAVENOUS at 08:26

## 2022-12-12 RX ADMIN — SODIUM CHLORIDE, PRESERVATIVE FREE 10 ML: 5 INJECTION INTRAVENOUS at 21:01

## 2022-12-12 RX ADMIN — DULOXETINE HYDROCHLORIDE 60 MG: 30 CAPSULE, DELAYED RELEASE ORAL at 08:30

## 2022-12-12 RX ADMIN — HYDRALAZINE HYDROCHLORIDE 25 MG: 25 TABLET, FILM COATED ORAL at 20:41

## 2022-12-12 RX ADMIN — APIXABAN 5 MG: 5 TABLET, FILM COATED ORAL at 08:30

## 2022-12-12 RX ADMIN — APIXABAN 5 MG: 5 TABLET, FILM COATED ORAL at 20:41

## 2022-12-12 RX ADMIN — CARVEDILOL 25 MG: 25 TABLET, FILM COATED ORAL at 20:41

## 2022-12-12 RX ADMIN — SENNOSIDES 17.2 MG: 8.6 TABLET, FILM COATED ORAL at 20:41

## 2022-12-12 RX ADMIN — ARIPIPRAZOLE 2 MG: 2 TABLET ORAL at 21:00

## 2022-12-12 RX ADMIN — ATORVASTATIN CALCIUM 10 MG: 10 TABLET, FILM COATED ORAL at 08:30

## 2022-12-12 RX ADMIN — SODIUM CHLORIDE 50 ML: 9 INJECTION, SOLUTION INTRAVENOUS at 01:32

## 2022-12-12 RX ADMIN — SODIUM CHLORIDE, PRESERVATIVE FREE 10 ML: 5 INJECTION INTRAVENOUS at 01:19

## 2022-12-12 RX ADMIN — LEVETIRACETAM 750 MG: 500 TABLET, FILM COATED ORAL at 20:41

## 2022-12-12 RX ADMIN — MONTELUKAST 10 MG: 10 TABLET, FILM COATED ORAL at 20:41

## 2022-12-12 RX ADMIN — DIPHENHYDRAMINE HYDROCHLORIDE 12.5 MG: 50 INJECTION, SOLUTION INTRAMUSCULAR; INTRAVENOUS at 22:39

## 2022-12-12 RX ADMIN — HYDRALAZINE HYDROCHLORIDE 25 MG: 25 TABLET, FILM COATED ORAL at 08:30

## 2022-12-12 RX ADMIN — LEVOTHYROXINE SODIUM 100 MCG: 0.1 TABLET ORAL at 05:38

## 2022-12-12 RX ADMIN — AMPICILLIN SODIUM AND SULBACTAM SODIUM 3000 MG: 2; 1 INJECTION, POWDER, FOR SOLUTION INTRAMUSCULAR; INTRAVENOUS at 20:47

## 2022-12-12 RX ADMIN — CARVEDILOL 25 MG: 25 TABLET, FILM COATED ORAL at 08:30

## 2022-12-12 RX ADMIN — FAMOTIDINE 20 MG: 20 TABLET, FILM COATED ORAL at 08:30

## 2022-12-12 RX ADMIN — LEVETIRACETAM 750 MG: 500 TABLET, FILM COATED ORAL at 08:30

## 2022-12-12 RX ADMIN — PHENYTOIN SODIUM 200 MG: 100 CAPSULE ORAL at 01:22

## 2022-12-12 RX ADMIN — PHENYTOIN SODIUM 200 MG: 100 CAPSULE ORAL at 20:41

## 2022-12-12 RX ADMIN — PHENYTOIN SODIUM 100 MG: 100 CAPSULE ORAL at 08:30

## 2022-12-12 RX ADMIN — AMPICILLIN SODIUM AND SULBACTAM SODIUM 3000 MG: 2; 1 INJECTION, POWDER, FOR SOLUTION INTRAMUSCULAR; INTRAVENOUS at 01:34

## 2022-12-12 NOTE — PLAN OF CARE
Problem: Discharge Planning  Goal: Discharge to home or other facility with appropriate resources  Outcome: Progressing     Problem: Skin/Tissue Integrity  Goal: Absence of new skin breakdown  Description: 1. Monitor for areas of redness and/or skin breakdown  2. Assess vascular access sites hourly  3. Every 4-6 hours minimum:  Change oxygen saturation probe site  4. Every 4-6 hours:  If on nasal continuous positive airway pressure, respiratory therapy assess nares and determine need for appliance change or resting period.   Outcome: Progressing     Problem: Safety - Adult  Goal: Free from fall injury  Outcome: Progressing     Problem: ABCDS Injury Assessment  Goal: Absence of physical injury  Outcome: Progressing     Problem: Infection - Adult  Goal: Absence of infection at discharge  Outcome: Progressing  Goal: Absence of infection during hospitalization  Outcome: Progressing  Goal: Absence of fever/infection during anticipated neutropenic period  Outcome: Progressing

## 2022-12-12 NOTE — PROGRESS NOTES
Physician Progress Note      Ancelmo Madden  CSN #:                  [id number]  :                       1945  ADMIT DATE:       2022 7:58 PM  [address] [place] DATE:  RESPONDING  PROVIDER #:        Lila Isaacs MD          QUERY TEXT:    Pt admitted with cellulitis. Noted documentation of Sepsis by ordered   Neurology consultant. If possible, please document in progress notes and   discharge summary:    The medical record reflects the following:  Risk Factors: Cellulitis  Clinical Indicators: Neuro Consult cellulitis in left leg and sepsis which may   have been a potential trigger to breakthrough seizure; Lab findings WBC 26.9   17.5 .10.9 Lactic 2.3, 2.5, CRP 16.5 VS findings 100.5, 91, 17, 119/59, 94%RA   98.6, 67, 18, 107/45, 95%RA  Treatment: ID Consult, IV Unasyn, Flagyl, Merrem    Thank you  Denver Howells BSN, RN, CCDS  Clinical Documentation Improvement  Options provided:  -- Sepsis confirmed present on admission  -- Sepsis confirmed not present on admission  -- Sepsis ruled out  -- Other - I will add my own diagnosis  -- Disagree - Not applicable / Not valid  -- Disagree - Clinically unable to determine / Unknown  -- Refer to Clinical Documentation Reviewer    PROVIDER RESPONSE TEXT:    The diagnosis of Sepsis was confirmed as present on admission.     Query created by: Ramses Mendoza on  6:80 AM      Electronically signed by:  Lila Isaasc MD 2022 10:54 AM

## 2022-12-12 NOTE — PROGRESS NOTES
Vancomycin has been discontinued   Clinical Pharmacy to sign-off  Physician to re-consult pharmacy if future dosing is needed    Thank you for the consult,  Frankey Grimmer, PharmD  PGY1 Pharmacy Resident  12/12/2022 6:27 AM

## 2022-12-12 NOTE — CARE COORDINATION
12/12/22  Patient admitted for leg swelling and seizure. Patient noted to have left leg cellulitis. Patient is on IV Unasyn with plan to transition to PO amoxicillin at discharge. Patient is also on Keppra. Patient has a history of major depression, autistic disorder and epilepsy. Call placed to family to discuss transition of care with message left for [facility] KERI. Call also placed to [facility]. Spoke with Rox at [facility] who states Sarah Gautam is a long term care bed hold and can return when medically stable. [facility] is requesting updated therapy notes prior to discharge to try and obtain skilled therapy at the facility. MIRIAM/Bassam to follow for discharge planning.     Electronically signed by ARTEMIO Paulson on 12/12/2022 at 3:53 PM

## 2022-12-12 NOTE — PROGRESS NOTES
Hospitalist Progress Note      PCP: Efrain Desai DO    Date of Admission: 12/9/2022    Chief Complaint: left leg cellulitis    Hospital Course:    Ms. Senait Loza, a 76y.o. year old female  who  has a past medical history of Asthma, Autistic disorder, residual state, Cellulitis of left leg, Chronic major depressive disorder, recurrent episode ([address] Dermatophytosis, Hx of blood clots, Hyperlipidemia, Hypertension, Hypothyroidism, Lower limb ulcer, calf, left, with fat layer exposed ([address] Lymphedema of both lower extremities, Mental retardation, Obesity, Osteoarthritis, and Seizures ([address] presents with fever and chills for couple days, denies cough no chest pain no nausea or vomiting, noted to see her legs erythematous no dysuria    Subjective: Patient was seen at bedside and appears to be doing well. Patient would like DC, discussed regarding awaiting cultures repeat. Continue antibiotics.   Afebrile in the last 48 hours      Medications:  Reviewed    Infusion Medications    sodium chloride Stopped (12/12/22 0243)    sodium chloride 100 mL/hr at 12/10/22 1107     Scheduled Medications    bisacodyl  10 mg Rectal Daily    ampicillin-sulbactam  3,000 mg IntraVENous Q6H    apixaban  5 mg Oral BID    ARIPiprazole  2 mg Oral Nightly    atorvastatin  10 mg Oral Daily    carvedilol  25 mg Oral BID    DULoxetine  60 mg Oral Daily    famotidine  20 mg Oral Daily    hydrALAZINE  25 mg Oral BID    levETIRAcetam  750 mg Oral BID    levothyroxine  100 mcg Oral Daily    montelukast  10 mg Oral Nightly    phenytoin  100 mg Oral Daily    phenytoin  200 mg Oral Nightly    senna  2 tablet Oral Nightly    sodium chloride flush  10 mL IntraVENous 2 times per day     PRN Meds: diphenhydrAMINE, albuterol, sodium chloride flush, sodium chloride, ondansetron **OR** ondansetron, magnesium hydroxide, acetaminophen **OR** acetaminophen      Intake/Output Summary (Last 24 hours) at 12/12/2022 5375  Last data filed at 12/12/2022 4033  Gross per 24 hour   Intake 300 ml   Output 700 ml   Net -400 ml       Exam:    BP (!) 146/60   Pulse 69   Temp 97.7 °F (36.5 °C) (Oral)   Resp 16   Ht 5' (1.524 m)   Wt 222 lb (100.7 kg)   SpO2 97%   BMI 43.36 kg/m²     General appearance: No apparent distress, appears stated age and cooperative. HEENT: Pupils equal, round, and reactive to light. Conjunctivae/corneas clear. Neck: Supple, with full range of motion. No jugular venous distention. Trachea midline. Respiratory:  Normal respiratory effort. Clear to auscultation, bilaterally without Rales/Wheezes/Rhonchi. Cardiovascular: Regular rate and rhythm with normal S1/S2 without murmurs, rubs or gallops. Abdomen: Soft, non-tender, non-distended with normal bowel sounds. Musculoskeletal: No clubbing, cyanosis or edema bilaterally. Full range of motion without deformity. Skin: Left lower extremity-calf edema, erythema, warmth, tender until the knee-multiple wounds-improving,  right lower extremity-erythema also noted on the left leg  Neurologic: No new focal deficits    Labs:   Recent Labs     12/10/22  1333 12/11/22  0649 12/12/22  0422   WBC 17.5* 10.9 6.4   HGB 9.2* 8.7* 8.8*   HCT 28.5* 27.4* 27.9*    192 220     Recent Labs     12/10/22  1333 12/11/22  0649 12/12/22  0422    134 136   K 4.1 3.6 4.0    102 105   CO2 24 21* 20*   BUN 36* 30* 24*   CREATININE 1.5* 1.2* 1.1*   CALCIUM 8.2* 7.1* 8.2*     Recent Labs     12/10/22  1333 12/11/22  0649 12/12/22  0422   AST 62* 46* 46*   ALT 28 23 27   BILITOT 0.5 0.3 0.2   ALKPHOS 107* 92 95     No results for input(s): INR in the last 72 hours. No results for input(s): Rossi Ortiz in the last 72 hours.     Assessment/Plan:    Active Hospital Problems    Diagnosis Date Noted    Left leg cellulitis [T89.429] 12/09/2022     Priority: Medium    Leg swelling [M79.89] 08/07/2017   SHAWN on CKD  Leukocytosis  Lactic acidosis-resolved  seizure disorder  Hypertension  Hyperlipidemia  Hypothyroidism    -Was on vancomycin, meropenem which were started in the ER  -Blood cultures reviewed and showed to be positive for Streptococcus group A. Repeat blood cultures ordered  -Infectious disease consulted-switched to Unasyn and can be switched to amoxicillin at DC based on blood c/s repeat  -Wound cultures ordered-strep, staph, wound care consulted  -Continue home meds  -Reviewed notes and patient reportedly had a seizure yesterday prior to arrival as well. Currently on antiseizure meds  - neurology consulted for breakthrough seizure-Keppra added 500 mg twice daily, Continue phenytoin 100 mg daily and 200 mg nightly. Possibly secondary to infection due to decreased seizure threshold  - Seizure precautions    DVT Prophylaxis: Lovenox  Diet: ADULT DIET;  Regular  Code Status: Full Code    PT/OT Eval Status: ordered    Dispo -repeat blood c/s, IV antibiotics- can DC in the next 24hrs based on the cultures    Rosaland Dandy, MD

## 2022-12-12 NOTE — PROGRESS NOTES
[address] [place] [address] [facility]  [facility]  Progress Note     C/C : GAS bacteremia, sepsis ,   Left leg cellulitis    Denies fever or chills  Left leg swelling, pain   Afebrile       MEDS :     bisacodyl  10 mg Rectal Daily    ampicillin-sulbactam  3,000 mg IntraVENous Q6H    apixaban  5 mg Oral BID    ARIPiprazole  2 mg Oral Nightly    atorvastatin  10 mg Oral Daily    carvedilol  25 mg Oral BID    DULoxetine  60 mg Oral Daily    famotidine  20 mg Oral Daily    hydrALAZINE  25 mg Oral BID    levETIRAcetam  750 mg Oral BID    levothyroxine  100 mcg Oral Daily    montelukast  10 mg Oral Nightly    phenytoin  100 mg Oral Daily    phenytoin  200 mg Oral Nightly    senna  2 tablet Oral Nightly    sodium chloride flush  10 mL IntraVENous 2 times per day     Continuous Infusions:   sodium chloride Stopped (12/12/22 0243)    sodium chloride 100 mL/hr at 12/10/22 1107     PRN Meds:diphenhydrAMINE, albuterol, sodium chloride flush, sodium chloride, ondansetron **OR** ondansetron, magnesium hydroxide, acetaminophen **OR** acetaminophen    Allergies:  Latex, Cephalosporins, Clindamycin/lincomycin, Duoderm hydroactive [hydroactive dressings], and Tape [adhesive tape]    REVIEW OF SYSTEMS:    CONSTITUTIONAL:  No chills, fevers or night sweats. No loss of weight. EYES:  No double vision or drainage from eyes, ears or throat. HEENT:  No neck stiffness. No dysphagia. No drainage from eyes, ears or throat  RESPIRATORY:  No cough, productive sputum or hemoptysis. CARDIOVASCULAR:  No chest pain, palpitations, orthopnea or dyspnea on exertion. GASTROINTESTINAL:  No nausea, vomiting, diarrhea or constipation or hematochezia   GENITOURINARY:  No frequency burning dysuria or hematuria. INTEGUMENT/BREAST:  No rash or breast masses. HEMATOLOGIC/LYMPHATIC:  No lymphadenopathy or blood dyscrasics. ALLERGIC/IMMUNOLOGIC:  No anaphylaxis. ENDOCRINE:  No polyuria or polydipsia or temperature intolerance.     MUSCULOSKELETAL: No myalgia or arthralgia. Full ROM. NEUROLOGICAL:  No focal motor sensory deficit. BEHAVIOR/PSYCH:  No psychosis. PHYSICAL EXAM:    Vitals:    BP (!) 146/60   Pulse 69   Temp 97.7 °F (36.5 °C) (Oral)   Resp 16   Ht 5' (1.524 m)   Wt 222 lb (100.7 kg)   SpO2 97%   BMI 43.36 kg/m²     Constitutional: The patient is awake, alert, and oriented. Skin: Warm and dry. No rashes were noted. No jaundice. HEENT: Eyes show round, and reactive pupils. Moist mucous membranes, no ulcerations, no thrush. Neck: Supple to movements. No lymphadenopathy. Chest: No use of accessory muscles to breathe. Symmetrical expansion. Auscultation reveals no wheezing, crackles, or rhonchi. Cardiovascular: S1 and S2 are rhythmic and regular. No murmurs appreciated. Abdomen: Positive bowel sounds to auscultation. Benign to palpation. No masses felt. No hepatosplenomegaly. Genitourinary: Deferred  Extremities: No clubbing, no cyanosis, no edema. Musculoskeletal: Painful weeping skin lesions noted in the left leg. Neurological: Following commands, no focal neurodeficit.   Lines: peripheral      CBC+dif:    Recent Labs     12/10/22  1333 12/11/22  0649 12/12/22  0422   WBC 17.5* 10.9 6.4   HGB 9.2* 8.7* 8.8*   HCT 28.5* 27.4* 27.9*   MCV 97.3 98.9 97.9    192 220   NEUTROABS 16.31* 9.54* 4.73     Lab Results   Component Value Date    CRP 16.5 (H) 12/10/2022    CRP 8.0 (H) 09/21/2022    CRP 15.2 (H) 12/08/2019     No results found for: CRPHS  Lab Results   Component Value Date    SEDRATE 80 (H) 12/10/2022    SEDRATE 106 (H) 09/21/2022    SEDRATE 105 (H) 12/08/2019     Lab Results   Component Value Date    ALT 27 12/12/2022    AST 46 (H) 12/12/2022    ALKPHOS 95 12/12/2022    BILITOT 0.2 12/12/2022     Lab Results   Component Value Date/Time     12/12/2022 04:22 AM    K 4.0 12/12/2022 04:22 AM     12/12/2022 04:22 AM    CO2 20 12/12/2022 04:22 AM    BUN 24 12/12/2022 04:22 AM    CREATININE 1.1 12/12/2022 04:22 AM    GFRAA >60 09/24/2022 07:03 AM    LABGLOM 51 12/12/2022 04:22 AM    GLUCOSE 110 12/12/2022 04:22 AM    PROT 6.9 12/12/2022 04:22 AM    LABALBU 2.8 12/12/2022 04:22 AM    CALCIUM 8.2 12/12/2022 04:22 AM    BILITOT 0.2 12/12/2022 04:22 AM    ALKPHOS 95 12/12/2022 04:22 AM    AST 46 12/12/2022 04:22 AM    ALT 27 12/12/2022 04:22 AM       No results found for: PROTIME, INR    Lab Results   Component Value Date/Time    TSH 0.177 09/22/2022 06:21 AM       Lab Results   Component Value Date/Time    COLORU Yellow 12/22/2020 05:28 PM    PHUR 8.0 12/22/2020 05:28 PM    LABCAST FEW 12/05/2019 02:28 PM    WBCUA 5-10 12/22/2020 05:28 PM    RBCUA 1-3 12/22/2020 05:28 PM    BACTERIA MANY 12/22/2020 05:28 PM    CLARITYU CLOUDY 12/22/2020 05:28 PM    SPECGRAV 1.015 12/22/2020 05:28 PM    LEUKOCYTESUR TRACE 12/22/2020 05:28 PM    UROBILINOGEN 1.0 12/22/2020 05:28 PM    BILIRUBINUR Negative 12/22/2020 05:28 PM    BLOODU SMALL 12/22/2020 05:28 PM    GLUCOSEU Negative 12/22/2020 05:28 PM    AMORPHOUS FEW 12/22/2020 05:28 PM       No results found for: OCL8VCD, BEART, K4IFBPBN, PHART, THGBART, MII6WZW, PO2ART, EMV3CQB  Radiology:  XR CHEST PORTABLE   Final Result   Mild diffuse interstitial opacities in the lungs may represent pulmonary   edema or atypical pneumonia.              Microbiology:  Specimen Source: Blood     Blood Culture, Routine -- Abnormal     Gram stain performed from blood culture bottle media   Gram positive cocci in chains    Abnormal     Organism Strep pyogenes (Beta Strep Group A) Abnormal     Blood Culture, Routine --    Refer to previous culture of CXBL from 12-9-22 @ 2041 for susceptibility   results    Narrative:     CALL  [facility]  H64 tel. ,   Previous panic on this admission - call not needed per SOP, 12/10/2022 12:50,   by [facility]   Culture, Blood 2 [[id number]] (Abnormal)     Collected: 12/09/22 2041    Updated: 12/12/22 6826    Specimen Source: Blood     Organism Strep pyogenes (Beta Strep Group A) Abnormal    Narrative:     Source: BLOOD       Site:           Microbiology results called to and read back by Suzy Zurita, 12/10/2022        Assessment:  Left leg cellulitis  Strep pyogenes bacteremia ,sepsis sec to above   Penicillin and cephalosporin allergy- tolerating unasyn     Plan:    Unasyn 3 grams IV q 6 hrs   D/C on oral amoxicillin 500 mg po q X 10 daus     Discussed with Dr Mounika Noriega       Electronically signed by Lisset Grace MD on 12/12/2022 at 10:59 AM

## 2022-12-12 NOTE — DISCHARGE INSTR - COC
Continuity of Care Form    Patient Name: Amber Cruz   :  1945  MRN:  [id number]    Admit date:  2022  Discharge date:  22    Code Status Order: Full Code   Advance Directives:     Admitting Physician:  Aditya Diamond MD  PCP: Lena Alex DO    Discharging Nurse: Jason Romero RN  Discharging Hospital Unit/Room#: 6686/9803-T  Discharging Unit Phone Number: [phone]    Emergency Contact:   Extended Emergency Contact Information  Primary Emergency Contact: Jacqui Dykes [address] Phone: [phone]  Mobile Phone: [phone]  Relation: Other Relative    Past Surgical History:  Past Surgical History:   Procedure Laterality Date    [place] Left 2018    LEFT EYE CATARACT EXTRACTION IOL IMPLANT  ++LATEX ALLERGY++ performed by Dario Martínez MD at [facility] RMVL INSJ IO LENS PROSTH W/O ECP Right 10/12/2018    RIGHT EYE CATARACT EXTRACTION IOL IMPLANT  ++LATEX ALLERGY++ performed by Dario Martínez MD at [facility]  age 8       Immunization History:   Immunization History   Administered Date(s) Administered    Tdap (Boostrix, Adacel) 2020       Active Problems:  Patient Active Problem List   Diagnosis Code    Seizures ([facility] [address] R56.9    Lymphedema of both lower extremities I89.0    Leg swelling M79.89    Cellulitis of left lower extremity L03. 116    Dermatophytosis B35.9    Pain of both hip joints M25.551, M25.552    Autistic disorder F84.0    Anemia D64.9    Chronic anticoagulation Z79.01    History of DVT (deep vein thrombosis) Z86.718    Obesity (BMI 30-39. 9) E66.9    Sepsis ([facility] [address] A41.9    Left leg cellulitis L03.116       Isolation/Infection:   Isolation            No Isolation          Patient Infection Status       Infection Onset Added Last Indicated Last Indicated By Review Planned Expiration Resolved Resolved By    None active    Resolved    COVID-19 (Rule Out) 22 12/09/22 12/09/22 COVID-19, Rapid (Ordered)   12/09/22 Rule-Out Test Resulted            Nurse Assessment:  Last Vital Signs: BP (!) 144/55   Pulse 67   Temp 97.5 °F (36.4 °C) (Oral)   Resp 17   Ht 5' (1.524 m)   Wt 222 lb (100.7 kg)   SpO2 97%   BMI 43.36 kg/m²     Last documented pain score (0-10 scale):    Last Weight:   Wt Readings from Last 1 Encounters:   12/10/22 222 lb (100.7 kg)     Mental Status:  oriented and alert    IV Access:  - None    Nursing Mobility/ADLs:  Walking   Assisted  Transfer  Assisted  Bathing  Assisted  Dressing  Assisted  Toileting  Assisted  Feeding  Independent  Med Admin  Independent  Med Delivery   whole    Wound Care Documentation and Therapy:  Wound 12/05/19 Pretibial Left (Active)   Number of days: 1103        Elimination:  Continence: Bowel: Yes  Bladder: Yes  Urinary Catheter: None   Colostomy/Ileostomy/Ileal Conduit: No       Date of Last BM: 12/13/22    Intake/Output Summary (Last 24 hours) at 12/12/2022 1557  Last data filed at 12/12/2022 1428  Gross per 24 hour   Intake 300 ml   Output 700 ml   Net -400 ml     I/O last 3 completed shifts: In: 120 [P.O.:120]  Out: 700 [Urine:700]    Safety Concerns: At Risk for Falls    Impairments/Disabilities:      Vision and Hearing    Nutrition Therapy:  Current Nutrition Therapy:   - Oral Diet:  General    Routes of Feeding: Oral  Liquids: Thin Liquids  Daily Fluid Restriction: no  Last Modified Barium Swallow with Video (Video Swallowing Test): not done    Treatments at the Time of Hospital Discharge:   Respiratory Treatments: n/a  Oxygen Therapy:  is not on home oxygen therapy.   Ventilator:    - No ventilator support    Rehab Therapies: Physical Therapy and Occupational Therapy  Weight Bearing Status/Restrictions: No weight bearing restrictions  Other Medical Equipment (for information only, NOT a DME order):  wheelchair and walker  Other Treatments: n/a    Patient's personal belongings (please select all that are sent with patient):  Clothing, books    RN SIGNATURE:  Electronically signed by Rabia Manning RN on 12/13/22 at 1:30 PM EST    CASE MANAGEMENT/SOCIAL WORK SECTION    Inpatient Status Date: ***    Readmission Risk Assessment Score:  Readmission Risk              Risk of Unplanned Readmission:  28           Discharging to Facility/ Agency   Name: Malvin Essex   Address:Artis Mcdaniel [facility],  [address]  Phone:[phone]    Dialysis Facility (if applicable)   Name:  Address:  Dialysis Schedule:  Phone:  Fax:    / signature: Electronically signed by ARTEMIO Anglees on 12/12/22 at 3:58 PM EST    PHYSICIAN SECTION    Prognosis: {Prognosis:5475875958}    Condition at Discharge: Nubia Emmanuel Patient Condition:[id number]}    Rehab Potential (if transferring to Rehab): {Prognosis:6931193583}    Recommended Labs or Other Treatments After Discharge: ***    Physician Certification: I certify the above information and transfer of Floresita Swanson  is necessary for the continuing treatment of the diagnosis listed and that she requires {Admit to Appropriate Level of Care:65015} for {GREATER/LESS:258835967} 30 days.      Update Admission H&P: {CHP DME Changes in WHFID:421248129}    PHYSICIAN SIGNATURE:  {Esignature:938300455}

## 2022-12-13 LAB
ALBUMIN SERPL-MCNC: 2.7 G/DL (ref 3.5–5.2)
ALP BLD-CCNC: 90 U/L (ref 35–104)
ALT SERPL-CCNC: 26 U/L (ref 0–32)
ANION GAP SERPL CALCULATED.3IONS-SCNC: 9 MMOL/L (ref 7–16)
AST SERPL-CCNC: 35 U/L (ref 0–31)
BASOPHILS ABSOLUTE: 0.02 E9/L (ref 0–0.2)
BASOPHILS RELATIVE PERCENT: 0.4 % (ref 0–2)
BILIRUB SERPL-MCNC: <0.2 MG/DL (ref 0–1.2)
BUN BLDV-MCNC: 19 MG/DL (ref 6–23)
CALCIUM SERPL-MCNC: 8.1 MG/DL (ref 8.6–10.2)
CHLORIDE BLD-SCNC: 110 MMOL/L (ref 98–107)
CO2: 22 MMOL/L (ref 22–29)
CREAT SERPL-MCNC: 1 MG/DL (ref 0.5–1)
EOSINOPHILS ABSOLUTE: 0.35 E9/L (ref 0.05–0.5)
EOSINOPHILS RELATIVE PERCENT: 6.2 % (ref 0–6)
GFR SERPL CREATININE-BSD FRML MDRD: 57 ML/MIN/1.73
GLUCOSE BLD-MCNC: 100 MG/DL (ref 74–99)
HCT VFR BLD CALC: 27.9 % (ref 34–48)
HEMOGLOBIN: 8.9 G/DL (ref 11.5–15.5)
IMMATURE GRANULOCYTES #: 0.03 E9/L
IMMATURE GRANULOCYTES %: 0.5 % (ref 0–5)
LYMPHOCYTES ABSOLUTE: 1.05 E9/L (ref 1.5–4)
LYMPHOCYTES RELATIVE PERCENT: 18.6 % (ref 20–42)
MCH RBC QN AUTO: 31.6 PG (ref 26–35)
MCHC RBC AUTO-ENTMCNC: 31.9 % (ref 32–34.5)
MCV RBC AUTO: 98.9 FL (ref 80–99.9)
MONOCYTES ABSOLUTE: 0.61 E9/L (ref 0.1–0.95)
MONOCYTES RELATIVE PERCENT: 10.8 % (ref 2–12)
NEUTROPHILS ABSOLUTE: 3.59 E9/L (ref 1.8–7.3)
NEUTROPHILS RELATIVE PERCENT: 63.5 % (ref 43–80)
ORGANISM: ABNORMAL
PDW BLD-RTO: 13.7 FL (ref 11.5–15)
PLATELET # BLD: 224 E9/L (ref 130–450)
PMV BLD AUTO: 9.7 FL (ref 7–12)
POTASSIUM REFLEX MAGNESIUM: 3.9 MMOL/L (ref 3.5–5)
RBC # BLD: 2.82 E12/L (ref 3.5–5.5)
SODIUM BLD-SCNC: 141 MMOL/L (ref 132–146)
TOTAL PROTEIN: 6.4 G/DL (ref 6.4–8.3)
WBC # BLD: 5.7 E9/L (ref 4.5–11.5)
WOUND/ABSCESS: ABNORMAL

## 2022-12-13 PROCEDURE — 97535 SELF CARE MNGMENT TRAINING: CPT

## 2022-12-13 PROCEDURE — 6360000002 HC RX W HCPCS: Performed by: INTERNAL MEDICINE

## 2022-12-13 PROCEDURE — 6370000000 HC RX 637 (ALT 250 FOR IP): Performed by: INTERNAL MEDICINE

## 2022-12-13 PROCEDURE — 80053 COMPREHEN METABOLIC PANEL: CPT

## 2022-12-13 PROCEDURE — 85025 COMPLETE CBC W/AUTO DIFF WBC: CPT

## 2022-12-13 PROCEDURE — 1200000000 HC SEMI PRIVATE

## 2022-12-13 PROCEDURE — 2580000003 HC RX 258: Performed by: EMERGENCY MEDICINE

## 2022-12-13 PROCEDURE — 97530 THERAPEUTIC ACTIVITIES: CPT

## 2022-12-13 PROCEDURE — 97161 PT EVAL LOW COMPLEX 20 MIN: CPT

## 2022-12-13 PROCEDURE — 2580000003 HC RX 258: Performed by: INTERNAL MEDICINE

## 2022-12-13 PROCEDURE — 6370000000 HC RX 637 (ALT 250 FOR IP): Performed by: STUDENT IN AN ORGANIZED HEALTH CARE EDUCATION/TRAINING PROGRAM

## 2022-12-13 PROCEDURE — 94664 DEMO&/EVAL PT USE INHALER: CPT

## 2022-12-13 PROCEDURE — 36415 COLL VENOUS BLD VENIPUNCTURE: CPT

## 2022-12-13 RX ORDER — AMOXICILLIN 250 MG/1
250 CAPSULE ORAL EVERY 8 HOURS SCHEDULED
Status: DISCONTINUED | OUTPATIENT
Start: 2022-12-13 | End: 2022-12-14 | Stop reason: HOSPADM

## 2022-12-13 RX ORDER — AMOXICILLIN 250 MG/1
500 CAPSULE ORAL EVERY 8 HOURS SCHEDULED
Qty: 60 CAPSULE | Refills: 0 | DISCHARGE
Start: 2022-12-13 | End: 2022-12-23

## 2022-12-13 RX ORDER — DOXYCYCLINE HYCLATE 100 MG/1
100 CAPSULE ORAL EVERY 12 HOURS SCHEDULED
Qty: 20 CAPSULE | Refills: 0 | DISCHARGE
Start: 2022-12-13 | End: 2022-12-23

## 2022-12-13 RX ORDER — DOXYCYCLINE HYCLATE 100 MG/1
100 CAPSULE ORAL EVERY 12 HOURS SCHEDULED
Status: DISCONTINUED | OUTPATIENT
Start: 2022-12-13 | End: 2022-12-14 | Stop reason: HOSPADM

## 2022-12-13 RX ORDER — AMOXICILLIN 500 MG/1
500 CAPSULE ORAL
Qty: 10 CAPSULE | Refills: 0 | Status: SHIPPED | OUTPATIENT
Start: 2022-12-13 | End: 2022-12-23

## 2022-12-13 RX ADMIN — HYDRALAZINE HYDROCHLORIDE 25 MG: 25 TABLET, FILM COATED ORAL at 09:15

## 2022-12-13 RX ADMIN — DOXYCYCLINE HYCLATE 100 MG: 100 CAPSULE ORAL at 22:20

## 2022-12-13 RX ADMIN — CARVEDILOL 25 MG: 25 TABLET, FILM COATED ORAL at 09:14

## 2022-12-13 RX ADMIN — ARIPIPRAZOLE 2 MG: 2 TABLET ORAL at 22:31

## 2022-12-13 RX ADMIN — AMOXICILLIN 250 MG: 250 CAPSULE ORAL at 13:42

## 2022-12-13 RX ADMIN — FAMOTIDINE 20 MG: 20 TABLET, FILM COATED ORAL at 09:15

## 2022-12-13 RX ADMIN — AMOXICILLIN 250 MG: 250 CAPSULE ORAL at 22:30

## 2022-12-13 RX ADMIN — MONTELUKAST 10 MG: 10 TABLET, FILM COATED ORAL at 22:21

## 2022-12-13 RX ADMIN — PHENYTOIN SODIUM 100 MG: 100 CAPSULE ORAL at 09:13

## 2022-12-13 RX ADMIN — ATORVASTATIN CALCIUM 10 MG: 10 TABLET, FILM COATED ORAL at 09:14

## 2022-12-13 RX ADMIN — LEVETIRACETAM 750 MG: 500 TABLET, FILM COATED ORAL at 22:20

## 2022-12-13 RX ADMIN — SODIUM CHLORIDE, PRESERVATIVE FREE 10 ML: 5 INJECTION INTRAVENOUS at 09:15

## 2022-12-13 RX ADMIN — AMPICILLIN SODIUM AND SULBACTAM SODIUM 3000 MG: 2; 1 INJECTION, POWDER, FOR SOLUTION INTRAMUSCULAR; INTRAVENOUS at 07:39

## 2022-12-13 RX ADMIN — DULOXETINE HYDROCHLORIDE 60 MG: 30 CAPSULE, DELAYED RELEASE ORAL at 09:14

## 2022-12-13 RX ADMIN — SENNOSIDES 17.2 MG: 8.6 TABLET, FILM COATED ORAL at 22:20

## 2022-12-13 RX ADMIN — ALBUTEROL SULFATE 2.5 MG: 2.5 SOLUTION RESPIRATORY (INHALATION) at 10:29

## 2022-12-13 RX ADMIN — CARVEDILOL 25 MG: 25 TABLET, FILM COATED ORAL at 22:21

## 2022-12-13 RX ADMIN — SODIUM CHLORIDE: 9 INJECTION, SOLUTION INTRAVENOUS at 07:33

## 2022-12-13 RX ADMIN — APIXABAN 5 MG: 5 TABLET, FILM COATED ORAL at 21:00

## 2022-12-13 RX ADMIN — APIXABAN 5 MG: 5 TABLET, FILM COATED ORAL at 09:15

## 2022-12-13 RX ADMIN — AMPICILLIN SODIUM AND SULBACTAM SODIUM 3000 MG: 2; 1 INJECTION, POWDER, FOR SOLUTION INTRAMUSCULAR; INTRAVENOUS at 02:04

## 2022-12-13 RX ADMIN — LEVETIRACETAM 750 MG: 500 TABLET, FILM COATED ORAL at 09:13

## 2022-12-13 RX ADMIN — HYDRALAZINE HYDROCHLORIDE 25 MG: 25 TABLET, FILM COATED ORAL at 22:20

## 2022-12-13 RX ADMIN — LEVOTHYROXINE SODIUM 100 MCG: 0.1 TABLET ORAL at 07:19

## 2022-12-13 RX ADMIN — PHENYTOIN SODIUM 200 MG: 100 CAPSULE ORAL at 22:30

## 2022-12-13 NOTE — PROGRESS NOTES
Physical Therapy  Physical Therapy Initial Evaluation    Name: Vera Hoffman  : 1945  MRN: [id number]      Date of Service: 2022    Evaluating PT:  Gretchen Mendez PT [id number]    Referring provider/PT Order:  PT Eval and Treat   Electronically Signed By  Ariela Corbett MD  NPI:  [id number] Date  Dec 10, 2022  2:32 AM     Room #:  7211/5763-K  Diagnosis:  Leg swelling [M79.89]  Seizure disorder ([address] [G40.909]  Left leg cellulitis [C11.695]  Cellulitis of left lower extremity [L03.116]  PMHx/PSHx:     has a past medical history of Asthma, Autistic disorder, residual state, Cellulitis of left leg, Chronic major depressive disorder, recurrent episode ([address] Dermatophytosis, Hx of blood clots, Hyperlipidemia, Hypertension, Hypothyroidism, Lower limb ulcer, calf, left, with fat layer exposed ([address] Lymphedema of both lower extremities, Mental retardation, Obesity, Osteoarthritis, and Seizures ([address] has a past surgical history that includes Tonsillectomy (age 8); Appendectomy (); pr corneal transplant,endothelial (Left, 2018); and pr xcapsl ctrc rmvl insj io lens prosth w/o ecp (Right, 10/12/2018). Procedure/Surgery:  none  Precautions:  Falls, alarm and skin , FWB (full weight bearing)   Equipment Needs: Equipment at [employer],    SUBJECTIVE:    Patient lives at [facility]. States she transfers herself to w/c which she uses for mobility? ? Equipment owned: Equipment at [employer],      OBJECTIVE:   Initial Evaluation  Date: 22 Treatment Short Term/ Long Term   Goals   AM-PAC 6 Clicks 77/41     Was pt agreeable to Eval/treatment? yes     Does pt have pain? With rolling to R. Yelled out in discomfort.       Bed Mobility  Rolling: Max x 2  Supine to sit:   Max x 2   Sit to supine: Max x 2   Scooting: Max x 2   Rolling: Min  Supine to sit: Min  Sit to supine: Min  Scooting: Min   Transfers Sit to stand: Max x 2 t  Stand to sit: Max x 2  Stand pivot: NT Started to urinate upon standing. Sit to stand: Mod   Stand to sit: Mod    Stand pivot: Mod     Ambulation    NT states she uses w/c for mobility? ?     Stair negotiation: ascended and descended  NT       Strength/ROM:     RLE grossly 4-/5  LLE grossly 4-/5  RLE AROM WFL  LLE AROM WFL    Balance:   Static Sitting: SBA  Dynamic Sitting: SBA  Static Standing: Max x 2   Dynamic Standing: Max x 2      Patient is Alert & Oriented x person, place, time, and situation and follows directions   Sensation:  Pt denies numbness and tingling to extremities  Edema:  none  Therapeutic Exercises:  Functional activity as stated above. Activity level may be more due patient not having her equipment and setup she is used to at facility. Patient education  Pt educated on role of Physical Therapy, risks of immobility, safety and plan of care and  importance of mobility while in hospital  and use of call light for safety. Patient response to education:   Pt verbalized understanding Pt demonstrated skill Pt requires further education in this area   yes yes Reminders     ASSESSMENT:    Conditions Requiring Skilled Therapeutic Intervention:    [x]Decreased strength     [x]Decreased ROM  [x]Decreased functional mobility  [x]Decreased balance   [x]Decreased endurance   [x]Decreased posture  []Decreased sensation  []Decreased coordination   []Decreased vision  [x]Decreased safety awareness   []Increased pain       Comments:    RN cleared patient for participation in therapy session. Patient was seen this date for PT evaluation. . Patient was agreeable to intervention. Results of the functional assessment are noted above. Upon entering the room patient was found sitting with HOB elevated. Assisted to EOB. Sat EOB x 10 minutes to increase dynamic sitting balance and activity tolerance. Attempted STS and patient urinated requiring return to bed. Rolling completed to change soiled linen.  Patient yelling out when rolled to the Right prefers Left side positioning. At end of session, patient in bed with [facility] elevated TAPS used off load to right call light and phone within reach,  all lines and tubes intact, nursing notified. This patient can benefit from the continuation of skilled PT possibly in a rehab setting to maximize functional level and return to PLOF. Assistance of two required due to acuity of medical condition, complex medical lines management as well as overall deconditioning of patient. Treatment:  Patient completed and was instructed in the following treatment:      Bed mobility training - pt given verbal and tactile cues to facilitate proper sequencing and safety during rolling and supine>sit as well as provided with physical assistance to complete task    STS and transfer training - educated on hand/foot placement, safety, and sequencing during STS and pivot transfers using assistive device  Gait training - NT  Skillful positioning in bed to protect skin/joint integrity. Vitals and symptoms were closely monitored throughout session. Pt's/ family goals      Return Home    Prognosis is good  for reaching above PT goals.     Patient and or family understand(s) diagnosis, prognosis, and plan of care.  yes,     PHYSICAL THERAPY PLAN OF CARE:    PT POC is established based on physician order and patient diagnosis     Diagnosis:  Leg swelling [M79.89]  Seizure disorder ([facility] [address] [G40.909]  Left leg cellulitis [H19.764]  Cellulitis of left lower extremity [L03.116]  Specific instructions for next treatment:  Sit EOB, postural exercises and Transfer to bedside chair  Current Treatment Recommendations:     [x] Strengthening to improve independence with functional mobility   [x] ROM to improve independence with functional mobility   [x] Balance Training to improve static/dynamic balance and to reduce fall risk  [x] Endurance Training to improve activity tolerance during functional mobility   [x] Transfer Training to improve safety and independence with all functional transfers   [x] Gait Training to improve gait mechanics, endurance and asses need for appropriate assistive device  [x] Stair Training in preparation for safe discharge home and/or into the community when appropriate  [] Positioning to prevent skin breakdown and contractures  [x] Safety and Education Training   [] Patient/Caregiver Education   [] HEP  [] Gait Team to be added to POC  [] Other     PT long term treatment goals are located in above grid    Frequency of treatments: 2-5x/week x 1-2 weeks. Time in  1005  Time out  1030    Total Treatment Time  10 minutes     Evaluation Time includes thorough review of current medical information, gathering information on past medical history/social history and prior level of function, completion of standardized testing/informal observation of tasks, assessment of data and education on plan of care and goals.     CPT codes:  [x] Low Complexity PT evaluation 07318  [] Moderate Complexity PT evaluation 62811  [] High Complexity PT evaluation 37495  [] PT Re-evaluation 35609  [] Gait training 74673 - minutes  [] Manual therapy 63050  minutes  [x] Therapeutic activities 85656 -10 minutes  [] Therapeutic exercises 74798 - minutes  [] Neuromuscular reeducation [id number] minutes     Arturo Dykes, [address] [facility]

## 2022-12-13 NOTE — DISCHARGE SUMMARY
Hospitalist Discharge Summary    Patient ID: Mae Orellana   Patient : 1945  Patient's PCP: Veronica Butler DO    Admit Date: 2022   Admitting Physician: Sharmila Pantoja MD    Discharge Date:  2022  Discharge Physician: Filomena Esparza MD   Discharge Condition: Stable  Discharge Disposition: Skilled Facility    History of presenting illness:  Ms. Mae Orellana, a 76y.o. year old female  who  has a past medical history of Asthma, Autistic disorder, residual state, Cellulitis of left leg, Chronic major depressive disorder, recurrent episode ([address] Dermatophytosis, Hx of blood clots, Hyperlipidemia, Hypertension, Hypothyroidism, Lower limb ulcer, calf, left, with fat layer exposed ([address] Lymphedema of both lower extremities, Mental retardation, Obesity, Osteoarthritis, and Seizures ([address] presents with fever and chills for couple days. She was found to have left leg cellulitis. She was found to have bacteremia with strep pyogenes. Given concern for sepsis ID was consulted initially on IV Unasyn and transition to amoxicillin 100 mg daily for 10 more days. Despite penicillin allergy she did tolerate Unasyn in house with no side effects. Also had SHAWN presentation creatinine of 1.5 improved to 1 at the time of discharge. Hospital course in brief:  (Please refer to daily progress notes for a comprehensive review of the hospitalization by requesting medical records)  As above    Physical Exam  Constitutional:       Appearance: Normal appearance. HENT:      Head: Normocephalic. Eyes:      Extraocular Movements: Extraocular movements intact. Pupils: Pupils are equal, round, and reactive to light. Cardiovascular:      Rate and Rhythm: Normal rate and regular rhythm. Pulmonary:      Effort: Pulmonary effort is normal.      Breath sounds: Normal breath sounds. Abdominal:      General: Abdomen is flat. Palpations: Abdomen is soft.    Skin:     Comments: Left lower extremity wound dressing with some dry skin and surrounding mild cellulitis   Neurological:      General: No focal deficit present. Mental Status: She is alert and oriented to person, place, and time. Consults:   IP CONSULT TO INTERNAL MEDICINE  PHARMACY TO DOSE VANCOMYCIN  IP CONSULT TO INFECTIOUS DISEASES  IP CONSULT TO NEUROLOGY    Discharge Diagnoses:  Left leg cellulitis  Sepsis secondary to strep pyogenes bacteremia  SHAWN-resolved  Seizure disorder  Hypertension  Hypothyroidism    Discharge Instructions / Follow up:    Continued appropriate risk factor modification of blood pressure, diabetes and serum lipids will remain essential to reducing risk of future atherosclerotic development    Activity: activity as tolerated    Significant labs:  CBC:   Recent Labs     12/11/22 0649 12/12/22 0422 12/13/22 0449   WBC 10.9 6.4 5.7   RBC 2.77* 2.85* 2.82*   HGB 8.7* 8.8* 8.9*   HCT 27.4* 27.9* 27.9*   MCV 98.9 97.9 98.9   RDW 14.1 13.7 13.7    220 224     BMP:   Recent Labs     12/11/22 0649 12/12/22 0422 12/13/22 0449    136 141   K 3.6 4.0 3.9    105 110*   CO2 21* 20* 22   BUN 30* 24* 19   CREATININE 1.2* 1.1* 1.0     LFT:  Recent Labs     12/11/22 0649 12/12/22 0422 12/13/22 0449   PROT 6.4 6.9 6.4   ALKPHOS 92 95 90   ALT 23 27 26   AST 46* 46* 35*   BILITOT 0.3 0.2 <0.2     PT/INR: No results for input(s): INR, APTT in the last 72 hours. BNP: No results for input(s): BNP in the last 72 hours.   Hgb A1C: No results found for: LABA1C  Folate and B12: No results found for: FMGXJHRR30, No results found for: FOLATE  Thyroid Studies:   Lab Results   Component Value Date    TSH 0.177 (L) 09/22/2022       Urinalysis:    Lab Results   Component Value Date/Time    NITRU Negative 12/22/2020 05:28 PM    WBCUA 5-10 12/22/2020 05:28 PM    BACTERIA MANY 12/22/2020 05:28 PM    RBCUA 1-3 12/22/2020 05:28 PM    BLOODU SMALL 12/22/2020 05:28 PM    SPECGRAV 1.015 12/22/2020 05:28 PM    GLUCOSEU Negative 12/22/2020 05:28 PM       Imaging:  XR CHEST PORTABLE    Result Date: 12/9/2022  EXAMINATION: ONE XRAY VIEW OF THE CHEST 12/9/2022 9:04 pm COMPARISON: None. HISTORY: ORDERING SYSTEM PROVIDED HISTORY: fever FINDINGS: Heart size is normal.  Mild diffuse interstitial opacities in the lungs. No pleural effusion or pneumothorax. Mild diffuse interstitial opacities in the lungs may represent pulmonary edema or atypical pneumonia.        Discharge Medications:      Medication List        START taking these medications      amoxicillin 500 MG capsule  Commonly known as: AMOXIL  Take 1 capsule by mouth daily (with breakfast) for 10 days            CONTINUE taking these medications      albuterol (2.5 MG/3ML) 0.083% nebulizer solution  Commonly known as: PROVENTIL     ammonium lactate 12 % cream  Commonly known as: AMLACTIN     apixaban 5 MG Tabs tablet  Commonly known as: ELIQUIS     ARIPiprazole 2 MG tablet  Commonly known as: ABILIFY     atorvastatin 10 MG tablet  Commonly known as: LIPITOR  Take 1 tablet by mouth daily     bumetanide 1 MG tablet  Commonly known as: BUMEX     camphor-menthol 0.5-0.5 % lotion  Commonly known as: SARNA     carvedilol 25 MG tablet  Commonly known as: COREG     DULoxetine 60 MG extended release capsule  Commonly known as: CYMBALTA     famotidine 20 MG tablet  Commonly known as: PEPCID     fluocinonide 0.05 % cream  Commonly known as: LIDEX     hydrALAZINE 25 MG tablet  Commonly known as: APRESOLINE  Take 1 tablet by mouth 2 times daily     levETIRAcetam 750 MG tablet  Commonly known as: KEPPRA     levothyroxine 100 MCG tablet  Commonly known as: SYNTHROID     magnesium hydroxide 400 MG/5ML suspension  Commonly known as: MILK OF MAGNESIA     montelukast 10 MG tablet  Commonly known as: SINGULAIR     oxybutynin 5 MG tablet  Commonly known as: DITROPAN     Oyster Shell Calcium/D 500-400 MG-UNIT Tabs     * phenytoin 100 MG ER capsule  Commonly known as: DILANTIN     * phenytoin 100 MG ER capsule  Commonly known as: DILANTIN     senna 8.6 MG tablet  Commonly known as: SENOKOT     therapeutic multivitamin-minerals tablet           * This list has 2 medication(s) that are the same as other medications prescribed for you. Read the directions carefully, and ask your doctor or other care provider to review them with you. Where to Get Your Medications        You can get these medications from any pharmacy    Bring a paper prescription for each of these medications  amoxicillin 500 MG capsule         Time Spent on discharge is more than 35 minutes in the examination, evaluation, counseling and review of medications and discharge plan.    +++++++++++++++++++++++++++++++++++++++++++++++++  Geronimo Lynch MD  [address] [place] [address]  +++++++++++++++++++++++++++++++++++++++++++++++++  NOTE: This report was transcribed using voice recognition software. Every effort was made to ensure accuracy; however, inadvertent computerized transcription errors may be present.

## 2022-12-13 NOTE — PROGRESS NOTES
Occupational Therapy  OT BEDSIDE TREATMENT NOTE   [address] [facility] [address] [facility]  [address] [place] [address]      ZYBE:  Patient Name: Nayana Ott  MRN: [id number]  : 1945  Room: [address] [place] [address]     Evaluating OT: SONIA Westbrook, OTR/L [id number]  Referring Sharif Bedolla MD  Specific Provider Orders: OT eval and treat 12/10  Recommended Adaptive Equipment:  TBD      Diagnosis: LLE cellulitis   Surgery: none   Pertinent Medical History: asthma, HTN, HLD, obesity, MR   Precautions:  Fall Risk, seizure     Assessment of current deficits   [x] Functional mobility           [x]ADLs           [x] Strength                  [x]Cognition   [x] Functional transfers         [x] IADLs         [x] Safety Awareness   [x]Endurance   [] Fine Coordination                         [x] Balance      [] Vision/perception   [x]Sensation     []Gross Motor Coordination             [] ROM           [] Delirium                   [] Motor Control      OT PLAN OF CARE   OT POC based on physician orders, patient diagnosis and results of clinical assessment     Frequency/Duration: 2-3 days/wk for 2 weeks PRN   Specific OT Treatment to include:   * Instruction/training on adapted ADL techniques and AE recommendations to increase functional independence within precautions       * Training on energy conservation strategies, correct breathing pattern and techniques to improve independence/tolerance for self-care routine  * Functional transfer/mobility training/DME recommendations for increased independence, safety, and fall prevention  * Patient/Family education to increase follow through with safety techniques and functional independence  * Recommendation of environmental modifications for increased safety with functional transfers/mobility and ADLs  * Therapeutic exercise to improve motor endurance, ROM, and functional strength for ADLs/functional transfers  * Therapeutic activities to facilitate/challenge dynamic balance, stand tolerance for increased safety and independence with ADLs  * Neuro-muscular re-education: facilitation of righting/equilibrium reactions, midline orientation, scapular stability/mobility, normalization of muscle tone, and facilitation of volitional active controled movement     Home Living: Pt [facility] from [facility]. Pt required assist with ADLs and completed SPTs. Pain Level: 0/10  Cognition: A&O: 3/4; Follows 1 step directions, with repetition and increased time             Memory:  good              Sequencing:  fair              Problem solving:  fair              Judgement/safety:  fair      Functional Assessment:  AM-PAC Daily Activity Raw Score: 11/24    Initial Eval Status  Date: 12/11/22 Treatment Status  Date:12/13/22 STGs=LTGs  Time Frame: 10-14 days   Feeding SUP  Sup  IND   Grooming SBA (seated)  Min A  Sitting EOB  IND   UB Dressing Mod A  Mod A  Min A   LB Dressing Dep (assist with socks) Dep  To dof/don socks  Max A    Bathing Mod x 2 Mod Ax2   Per last tx Max A    Toileting Mod x 2 Dep  Due to stand balance/tolerance deficits, pt incontinent multiple times throughout session  Max A   Bed Mobility  Log roll: Max A  Supine to sit: Max A   Sit to supine: NT  Log roll: Max Ax2  Supine to sit: Max Ax2   Sit to supine: Max Ax2 Log roll Min A  Supine to sit: Min A   Sit to supine: Min A   Functional Transfers Sit to stand: Max A   Stand to sit: Max A  Stand pivot: Mod x 2  Commode: NT Sit to stand: Max Ax2   Stand to sit: Max Ax2  Stand pivot: NT  Commode: NT  Mod A   Functional Mobility nt NT  Pt does not ambulate   Balance Sitting: SBA  Standing: Mod x 2 Sitting: SBA  Standing: Max x 2   Pt demo'd poor stand tolerance/balance presenting with flexed posture at hips and knees.      Activity Tolerance fair Fair-      Visual/  Perceptual Glasses: yes                                  UE ROM:        BUE: elbow flex WFL, shoulder flex grossly 90'  Strength: RUE: grossly 3/5         LUE: grossly 3/5   Strength: B WFL  Fine Motor Coordination:  WFL      Hearing: WFL  Sensation:  No c/o numbness/tingling   Tone:  WFL  Edema: BLEs      Comments: Upon arrival pt supine in bed. ADL retraining to increase safety and indep in dressing and toileting tasks, balance and trf training to increase participation in functional mobility and standing aspects of ADLs with increased safety. Pt educated on techniques to increase independence and safety during ADL's, bed mobility, and functional transfers. At end of session pt returned to supine with HOB elevated, call light within reach, bed alarm on. Pt has made slow progress towards set goals.      Continue with current plan of care    Treatment Time In:10:03am            Treatment Time Out: 10:26am             Treatment Charges: Mins Units   Ther Ex  [id number]     Manual Therapy [id number]     Thera Activities [id number] 13 1   ADL/Home Mgt 84713 10 1   Neuro Re-ed 62025     Group Therapy      Orthotic manage/training  [id number]     Non-Billable Time     Total Timed Treatment 23 [address] [facility] [address] [facility] [address] GAO/L [address]

## 2022-12-13 NOTE — CARE COORDINATION
Discharge order noted. Patient to discharge back to [facility] today. Ambulance transport set up for 4:30p  via [facility]; ambulance form and envelope completed. Call made to patient's family member, Filomena Hines, no answer; left message regarding the discharge. Call made to [facility] and updated her regarding patient's discharge. Nurse and patient informed.     Grayce Press, MSW, LSW [phone]

## 2022-12-13 NOTE — PROGRESS NOTES
[address] [place] [address] [facility]  Progress Note     C/C : GAS bacteremia, sepsis ,   Left leg cellulitis    Denies fever or chills  Left leg swelling, pain   Afebrile       MEDS :     bisacodyl  10 mg Rectal Daily    ampicillin-sulbactam  3,000 mg IntraVENous Q6H    apixaban  5 mg Oral BID    ARIPiprazole  2 mg Oral Nightly    atorvastatin  10 mg Oral Daily    carvedilol  25 mg Oral BID    DULoxetine  60 mg Oral Daily    famotidine  20 mg Oral Daily    hydrALAZINE  25 mg Oral BID    levETIRAcetam  750 mg Oral BID    levothyroxine  100 mcg Oral Daily    montelukast  10 mg Oral Nightly    phenytoin  100 mg Oral Daily    phenytoin  200 mg Oral Nightly    senna  2 tablet Oral Nightly    sodium chloride flush  10 mL IntraVENous 2 times per day     Continuous Infusions:   sodium chloride Stopped (12/12/22 0243)    sodium chloride 100 mL/hr at 12/13/22 0733     PRN Meds:diphenhydrAMINE, albuterol, sodium chloride flush, sodium chloride, ondansetron **OR** ondansetron, magnesium hydroxide, acetaminophen **OR** acetaminophen    Allergies:  Latex, Cephalosporins, Clindamycin/lincomycin, Duoderm hydroactive [hydroactive dressings], and Tape [adhesive tape]    REVIEW OF SYSTEMS:    CONSTITUTIONAL:  No chills, fevers or night sweats. No loss of weight. EYES:  No double vision or drainage from eyes, ears or throat. HEENT:  No neck stiffness. No dysphagia. No drainage from eyes, ears or throat  RESPIRATORY:  No cough, productive sputum or hemoptysis. CARDIOVASCULAR:  No chest pain, palpitations, orthopnea or dyspnea on exertion. GASTROINTESTINAL:  No nausea, vomiting, diarrhea or constipation or hematochezia   GENITOURINARY:  No frequency burning dysuria or hematuria. INTEGUMENT/BREAST:  No rash or breast masses. HEMATOLOGIC/LYMPHATIC:  No lymphadenopathy or blood dyscrasics. ALLERGIC/IMMUNOLOGIC:  No anaphylaxis. ENDOCRINE:  No polyuria or polydipsia or temperature intolerance.     MUSCULOSKELETAL: No myalgia or arthralgia. Full ROM. NEUROLOGICAL:  No focal motor sensory deficit. BEHAVIOR/PSYCH:  No psychosis. PHYSICAL EXAM:    Vitals:    BP (!) 149/48   Pulse 67   Temp 97.5 °F (36.4 °C) (Oral)   Resp 18   Ht 5' (1.524 m)   Wt 222 lb (100.7 kg)   SpO2 97%   BMI 43.36 kg/m²     Constitutional: The patient is awake, alert, and oriented. Skin: Warm and dry. No rashes were noted. No jaundice. HEENT: Eyes show round, and reactive pupils. Moist mucous membranes, no ulcerations, no thrush. Neck: Supple to movements. No lymphadenopathy. Chest: No use of accessory muscles to breathe. Symmetrical expansion. Auscultation reveals no wheezing, crackles, or rhonchi. Cardiovascular: S1 and S2 are rhythmic and regular. No murmurs appreciated. Abdomen: Positive bowel sounds to auscultation. Benign to palpation. No masses felt. No hepatosplenomegaly. Genitourinary: Deferred  Extremities: No clubbing, no cyanosis, no edema. Musculoskeletal: Painful weeping skin lesions noted in the left leg. Neurological: Following commands, no focal neurodeficit.   Lines: peripheral      CBC+dif:    Recent Labs     12/11/22  0649 12/12/22  0422 12/13/22  0449   WBC 10.9 6.4 5.7   HGB 8.7* 8.8* 8.9*   HCT 27.4* 27.9* 27.9*   MCV 98.9 97.9 98.9    220 224   NEUTROABS 9.54* 4.73 3.59     Lab Results   Component Value Date    CRP 16.5 (H) 12/10/2022    CRP 8.0 (H) 09/21/2022    CRP 15.2 (H) 12/08/2019     No results found for: CRPHS  Lab Results   Component Value Date    SEDRATE 80 (H) 12/10/2022    SEDRATE 106 (H) 09/21/2022    SEDRATE 105 (H) 12/08/2019     Lab Results   Component Value Date    ALT 26 12/13/2022    AST 35 (H) 12/13/2022    ALKPHOS 90 12/13/2022    BILITOT <0.2 12/13/2022     Lab Results   Component Value Date/Time     12/13/2022 04:49 AM    K 3.9 12/13/2022 04:49 AM     12/13/2022 04:49 AM    CO2 22 12/13/2022 04:49 AM    BUN 19 12/13/2022 04:49 AM    CREATININE 1.0 12/13/2022 04:49 AM    GFRAA >60 09/24/2022 07:03 AM    LABGLOM 57 12/13/2022 04:49 AM    GLUCOSE 100 12/13/2022 04:49 AM    PROT 6.4 12/13/2022 04:49 AM    LABALBU 2.7 12/13/2022 04:49 AM    CALCIUM 8.1 12/13/2022 04:49 AM    BILITOT <0.2 12/13/2022 04:49 AM    ALKPHOS 90 12/13/2022 04:49 AM    AST 35 12/13/2022 04:49 AM    ALT 26 12/13/2022 04:49 AM       No results found for: PROTIME, INR    Lab Results   Component Value Date/Time    TSH 0.177 09/22/2022 06:21 AM       Lab Results   Component Value Date/Time    COLORU Yellow 12/22/2020 05:28 PM    PHUR 8.0 12/22/2020 05:28 PM    LABCAST FEW 12/05/2019 02:28 PM    WBCUA 5-10 12/22/2020 05:28 PM    RBCUA 1-3 12/22/2020 05:28 PM    BACTERIA MANY 12/22/2020 05:28 PM    CLARITYU CLOUDY 12/22/2020 05:28 PM    SPECGRAV 1.015 12/22/2020 05:28 PM    LEUKOCYTESUR TRACE 12/22/2020 05:28 PM    UROBILINOGEN 1.0 12/22/2020 05:28 PM    BILIRUBINUR Negative 12/22/2020 05:28 PM    BLOODU SMALL 12/22/2020 05:28 PM    GLUCOSEU Negative 12/22/2020 05:28 PM    AMORPHOUS FEW 12/22/2020 05:28 PM       No results found for: EXX5OFO, BEART, D0NAECFR, PHART, THGBART, OQV3NYJ, PO2ART, GLE3OUX  Radiology:  XR CHEST PORTABLE   Final Result   Mild diffuse interstitial opacities in the lungs may represent pulmonary   edema or atypical pneumonia.              Microbiology:  Specimen Source: Blood     Blood Culture, Routine -- Abnormal     Gram stain performed from blood culture bottle media   Gram positive cocci in chains    Abnormal     Organism Strep pyogenes (Beta Strep Group A) Abnormal     Blood Culture, Routine --    Refer to previous culture of CXBL from 12-9-22 @ 2041 for susceptibility   results    Narrative:     CALL  [facility]  H64 tel. ,   Previous panic on this admission - call not needed per SOP, 12/10/2022 12:50,   by [facility]   Culture, Blood 2 [[id number]] (Abnormal)     Collected: 12/09/22 2041    Updated: 12/12/22 6255    Specimen Source: Blood     Organism Strep pyogenes (Beta Strep Group A) Abnormal Narrative:     Source: BLOOD       Site:           Microbiology results called to and read back by Suzy Zurita, 12/10/2022        Assessment:  Left leg cellulitis  Strep pyogenes bacteremia ,sepsis sec to above   Penicillin and cephalosporin allergy- tolerating unasyn     Plan:    Stop unasyn   Doxycyline 100 mg po q 12 hrs X 10 days ( cephalosporin allergy )   Amoxicillin 500 mg po q X 10 days            Electronically signed by Victorino Tolbert MD on 12/13/2022 at 12:43 PM

## 2022-12-14 VITALS
WEIGHT: 105.5 LBS | TEMPERATURE: 98.1 F | DIASTOLIC BLOOD PRESSURE: 60 MMHG | RESPIRATION RATE: 17 BRPM | BODY MASS INDEX: 20.71 KG/M2 | HEART RATE: 63 BPM | OXYGEN SATURATION: 97 % | HEIGHT: 60 IN | SYSTOLIC BLOOD PRESSURE: 144 MMHG

## 2022-12-15 LAB
BLOOD CULTURE, ROUTINE: ABNORMAL
BLOOD CULTURE, ROUTINE: NORMAL
CULTURE, BLOOD 2: NORMAL
ORGANISM: ABNORMAL
PHENYTOIN % FREE: 10.9 % (ref 8–14)
PHENYTOIN FREE: 1.2 UG/ML (ref 1–2.5)
PHENYTOIN LEVEL: 11 UG/ML (ref 10–20)

## 2022-12-16 LAB
BLOOD CULTURE, ROUTINE: NORMAL
CULTURE, BLOOD 2: NORMAL

## 2023-02-12 ENCOUNTER — APPOINTMENT (OUTPATIENT)
Dept: CT IMAGING | Age: 78
End: 2023-02-12
Payer: COMMERCIAL

## 2023-02-12 ENCOUNTER — APPOINTMENT (OUTPATIENT)
Dept: GENERAL RADIOLOGY | Age: 78
End: 2023-02-12
Payer: COMMERCIAL

## 2023-02-12 ENCOUNTER — HOSPITAL ENCOUNTER (EMERGENCY)
Age: 78
Discharge: HOME | End: 2023-02-13
Attending: EMERGENCY MEDICINE
Payer: COMMERCIAL

## 2023-02-12 DIAGNOSIS — G40.919 BREAKTHROUGH SEIZURE (HCC): Primary | ICD-10-CM

## 2023-02-12 LAB
ALBUMIN SERPL-MCNC: 4 G/DL (ref 3.5–5.2)
ALP BLD-CCNC: 145 U/L (ref 35–104)
ALT SERPL-CCNC: 20 U/L (ref 0–32)
ANION GAP SERPL CALCULATED.3IONS-SCNC: 10 MMOL/L (ref 7–16)
AST SERPL-CCNC: 26 U/L (ref 0–31)
BASOPHILS ABSOLUTE: 0.03 E9/L (ref 0–0.2)
BASOPHILS RELATIVE PERCENT: 0.5 % (ref 0–2)
BILIRUB SERPL-MCNC: <0.2 MG/DL (ref 0–1.2)
BUN BLDV-MCNC: 36 MG/DL (ref 6–23)
CALCIUM SERPL-MCNC: 9 MG/DL (ref 8.6–10.2)
CHLORIDE BLD-SCNC: 102 MMOL/L (ref 98–107)
CO2: 29 MMOL/L (ref 22–29)
CREAT SERPL-MCNC: 1.5 MG/DL (ref 0.5–1)
EOSINOPHILS ABSOLUTE: 0.17 E9/L (ref 0.05–0.5)
EOSINOPHILS RELATIVE PERCENT: 2.7 % (ref 0–6)
GFR SERPL CREATININE-BSD FRML MDRD: 35 ML/MIN/1.73
GLUCOSE BLD-MCNC: 98 MG/DL (ref 74–99)
HCT VFR BLD CALC: 30.8 % (ref 34–48)
HEMOGLOBIN: 10.1 G/DL (ref 11.5–15.5)
IMMATURE GRANULOCYTES #: 0.02 E9/L
IMMATURE GRANULOCYTES %: 0.3 % (ref 0–5)
LYMPHOCYTES ABSOLUTE: 1.44 E9/L (ref 1.5–4)
LYMPHOCYTES RELATIVE PERCENT: 23 % (ref 20–42)
MCH RBC QN AUTO: 30.9 PG (ref 26–35)
MCHC RBC AUTO-ENTMCNC: 32.8 % (ref 32–34.5)
MCV RBC AUTO: 94.2 FL (ref 80–99.9)
MONOCYTES ABSOLUTE: 0.67 E9/L (ref 0.1–0.95)
MONOCYTES RELATIVE PERCENT: 10.7 % (ref 2–12)
NEUTROPHILS ABSOLUTE: 3.94 E9/L (ref 1.8–7.3)
NEUTROPHILS RELATIVE PERCENT: 62.8 % (ref 43–80)
PDW BLD-RTO: 14 FL (ref 11.5–15)
PHENYTOIN DOSE AMOUNT: NORMAL
PHENYTOIN LEVEL: 11.4 UG/ML (ref 10–20)
PLATELET # BLD: 270 E9/L (ref 130–450)
PMV BLD AUTO: 8.8 FL (ref 7–12)
POTASSIUM SERPL-SCNC: 4.6 MMOL/L (ref 3.5–5)
RBC # BLD: 3.27 E12/L (ref 3.5–5.5)
SODIUM BLD-SCNC: 141 MMOL/L (ref 132–146)
TOTAL PROTEIN: 8.6 G/DL (ref 6.4–8.3)
TROPONIN, HIGH SENSITIVITY: 34 NG/L (ref 0–9)
WBC # BLD: 6.3 E9/L (ref 4.5–11.5)

## 2023-02-12 PROCEDURE — 70450 CT HEAD/BRAIN W/O DYE: CPT

## 2023-02-12 PROCEDURE — 84484 ASSAY OF TROPONIN QUANT: CPT

## 2023-02-12 PROCEDURE — 2580000003 HC RX 258: Performed by: EMERGENCY MEDICINE

## 2023-02-12 PROCEDURE — 99285 EMERGENCY DEPT VISIT HI MDM: CPT

## 2023-02-12 PROCEDURE — 71045 X-RAY EXAM CHEST 1 VIEW: CPT

## 2023-02-12 PROCEDURE — 93005 ELECTROCARDIOGRAM TRACING: CPT | Performed by: EMERGENCY MEDICINE

## 2023-02-12 PROCEDURE — 6370000000 HC RX 637 (ALT 250 FOR IP): Performed by: EMERGENCY MEDICINE

## 2023-02-12 PROCEDURE — 80185 ASSAY OF PHENYTOIN TOTAL: CPT

## 2023-02-12 PROCEDURE — 96375 TX/PRO/DX INJ NEW DRUG ADDON: CPT

## 2023-02-12 PROCEDURE — 96374 THER/PROPH/DIAG INJ IV PUSH: CPT

## 2023-02-12 PROCEDURE — 85025 COMPLETE CBC W/AUTO DIFF WBC: CPT

## 2023-02-12 PROCEDURE — 80053 COMPREHEN METABOLIC PANEL: CPT

## 2023-02-12 RX ORDER — 0.9 % SODIUM CHLORIDE 0.9 %
500 INTRAVENOUS SOLUTION INTRAVENOUS ONCE
Status: COMPLETED | OUTPATIENT
Start: 2023-02-12 | End: 2023-02-13

## 2023-02-12 RX ORDER — ACETAMINOPHEN 325 MG/1
650 TABLET ORAL ONCE
Status: COMPLETED | OUTPATIENT
Start: 2023-02-12 | End: 2023-02-12

## 2023-02-12 RX ADMIN — SODIUM CHLORIDE 500 ML: 9 INJECTION, SOLUTION INTRAVENOUS at 23:06

## 2023-02-12 RX ADMIN — ACETAMINOPHEN 650 MG: 325 TABLET ORAL at 23:06

## 2023-02-12 ASSESSMENT — PAIN - FUNCTIONAL ASSESSMENT: PAIN_FUNCTIONAL_ASSESSMENT: NONE - DENIES PAIN

## 2023-02-13 VITALS
HEART RATE: 84 BPM | DIASTOLIC BLOOD PRESSURE: 55 MMHG | SYSTOLIC BLOOD PRESSURE: 153 MMHG | OXYGEN SATURATION: 97 % | RESPIRATION RATE: 18 BRPM | TEMPERATURE: 99 F

## 2023-02-13 LAB
EKG ATRIAL RATE: 75 BPM
EKG P AXIS: 83 DEGREES
EKG P-R INTERVAL: 162 MS
EKG Q-T INTERVAL: 410 MS
EKG QRS DURATION: 74 MS
EKG QTC CALCULATION (BAZETT): 457 MS
EKG R AXIS: 14 DEGREES
EKG T AXIS: 29 DEGREES
EKG VENTRICULAR RATE: 75 BPM
TROPONIN, HIGH SENSITIVITY: 33 NG/L (ref 0–9)

## 2023-02-13 PROCEDURE — 84484 ASSAY OF TROPONIN QUANT: CPT

## 2023-02-13 PROCEDURE — 6360000002 HC RX W HCPCS: Performed by: EMERGENCY MEDICINE

## 2023-02-13 PROCEDURE — 93010 ELECTROCARDIOGRAM REPORT: CPT | Performed by: INTERNAL MEDICINE

## 2023-02-13 RX ORDER — KETOROLAC TROMETHAMINE 30 MG/ML
15 INJECTION, SOLUTION INTRAMUSCULAR; INTRAVENOUS ONCE
Status: COMPLETED | OUTPATIENT
Start: 2023-02-13 | End: 2023-02-13

## 2023-02-13 RX ORDER — ONDANSETRON 2 MG/ML
4 INJECTION INTRAMUSCULAR; INTRAVENOUS EVERY 6 HOURS PRN
Status: DISCONTINUED | OUTPATIENT
Start: 2023-02-13 | End: 2023-02-13 | Stop reason: HOSPADM

## 2023-02-13 RX ORDER — FENTANYL CITRATE 50 UG/ML
25 INJECTION, SOLUTION INTRAMUSCULAR; INTRAVENOUS ONCE
Status: COMPLETED | OUTPATIENT
Start: 2023-02-13 | End: 2023-02-13

## 2023-02-13 RX ADMIN — FENTANYL CITRATE 25 MCG: 50 INJECTION, SOLUTION INTRAMUSCULAR; INTRAVENOUS at 00:46

## 2023-02-13 RX ADMIN — KETOROLAC TROMETHAMINE 15 MG: 30 INJECTION, SOLUTION INTRAMUSCULAR; INTRAVENOUS at 02:41

## 2023-02-13 ASSESSMENT — PAIN SCALES - GENERAL
PAINLEVEL_OUTOF10: 7
PAINLEVEL_OUTOF10: 10

## 2023-02-13 ASSESSMENT — PAIN DESCRIPTION - DESCRIPTORS: DESCRIPTORS: ACHING

## 2023-02-13 ASSESSMENT — PAIN DESCRIPTION - ORIENTATION: ORIENTATION: RIGHT

## 2023-02-13 ASSESSMENT — PAIN DESCRIPTION - LOCATION: LOCATION: LEG

## 2023-02-13 NOTE — ED NOTES
Received call from [facility].  ETA will be 1 hour instead of 10 min as originally stated     Lauren Whiteside, RN  02/13/23 4647

## 2023-02-13 NOTE — ED NOTES
Patient yelling out, states \"I have been here since 8:00pm!\". This RN reassured patient that we are only waiting on CT results and will update her when they result.      Álvaro Baker RN  02/13/23 3659

## 2023-02-13 NOTE — ED NOTES
Nurse to nurse report called to [facility].       Orion More RN  02/13/23 0286

## 2023-02-13 NOTE — ED PROVIDER NOTES
HPI:  2/12/23, Time: 8:55 PM CONI Jean Patient is a 76 y.o. female presenting to the ED for seizure activity, beginning short time ago. The complaint has been persistent, moderate in severity, and worsened by nothing. Reportedly had seizure lasted several minutes short time ago as well as earlier this morning. Patient reporting some frontal headache she reports no trauma or injury. Patient does have history of seizure disorder is on Dilantin and Keppra. Patient plans nondistended dosages. Patient reporting no chest pain no difficulty breathing or abdominal pain there is no vomiting or diarrhea. Patient reporting no productive cough. Patient reporting no photophobia. ROS:   Pertinent positives and negatives are stated within HPI, all other systems reviewed and are negative.  --------------------------------------------- PAST HISTORY ---------------------------------------------  Past Medical History:  has a past medical history of Asthma, Autistic disorder, residual state, Cellulitis of left leg, Chronic major depressive disorder, recurrent episode ([address] Dermatophytosis, Hx of blood clots, Hyperlipidemia, Hypertension, Hypothyroidism, Lower limb ulcer, calf, left, with fat layer exposed ([address] Lymphedema of both lower extremities, Mental retardation, Obesity, Osteoarthritis, and Seizures ([address] Past Surgical History:  has a past surgical history that includes Tonsillectomy (age 8); Appendectomy (1969); pr keratoplasty endothelial (Left, 9/14/2018); and pr xcapsl ctrc rmvl insj io lens prosth w/o ecp (Right, 10/12/2018). Social History:  reports that she has never smoked. She has never used smokeless tobacco. She reports that she does not drink alcohol and does not use drugs. Family History: family history is not on file. The patients home medications have been reviewed.     Allergies: Latex, Cephalosporins, Clindamycin/lincomycin, Duoderm hydroactive [hydroactive dressings], and Tape Moses Nash tape]    ---------------------------------------------------PHYSICAL EXAM--------------------------------------    Constitutional/General: Alert and oriented x3, well appearing, non toxic in NAD  Head: Normocephalic and atraumatic  Eyes: PERRL, EOMI  Mouth: Oropharynx clear, handling secretions, no trismus  Neck: Supple, full ROM, non tender to palpation in the midline, no stridor, no crepitus, no meningeal signs  Pulmonary: Lungs clear to auscultation bilaterally, no wheezes, rales, or rhonchi. Not in respiratory distress  Cardiovascular:  Regular rate. Regular rhythm. No murmurs, gallops, or rubs. 2+ distal pulses  Chest: no chest wall tenderness  Abdomen: Soft. Non tender. Non distended. +BS. No rebound, guarding, or rigidity. No pulsatile masses appreciated. Musculoskeletal: Moves all extremities x 4. Warm and well perfused, no clubbing, cyanosis, patient has noted edema bilateral lower extremities left greater than right she has a dressing over her left lower leg there is some mild redness to anterior right leg pulses are intact distally  Skin: warm and dry. No rashes. Neurologic: GCS 15, CN 2-12 grossly intact, no focal deficits, symmetric strength 5/5 in the upper and lower extremities bilaterally  Psych: Normal Affect    -------------------------------------------------- RESULTS -------------------------------------------------  I have personally reviewed all laboratory and imaging results for this patient. Results are listed below.      LABS:  Results for orders placed or performed during the hospital encounter of 02/12/23   Phenytoin Level, Total   Result Value Ref Range    Phenytoin Lvl 11.4 10.0 - 20.0 ug/mL    Phenytoin Dose Amount Unknown    CBC with Auto Differential   Result Value Ref Range    WBC 6.3 4.5 - 11.5 E9/L    RBC 3.27 (L) 3.50 - 5.50 E12/L    Hemoglobin 10.1 (L) 11.5 - 15.5 g/dL    Hematocrit 30.8 (L) 34.0 - 48.0 %    MCV 94.2 80.0 - 99.9 fL    MCH 30.9 26.0 - 35.0 pg MCHC 32.8 32.0 - 34.5 %    RDW 14.0 11.5 - 15.0 fL    Platelets 661 404 - 333 E9/L    MPV 8.8 7.0 - 12.0 fL    Neutrophils % 62.8 43.0 - 80.0 %    Immature Granulocytes % 0.3 0.0 - 5.0 %    Lymphocytes % 23.0 20.0 - 42.0 %    Monocytes % 10.7 2.0 - 12.0 %    Eosinophils % 2.7 0.0 - 6.0 %    Basophils % 0.5 0.0 - 2.0 %    Neutrophils Absolute 3.94 1.80 - 7.30 E9/L    Immature Granulocytes # 0.02 E9/L    Lymphocytes Absolute 1.44 (L) 1.50 - 4.00 E9/L    Monocytes Absolute 0.67 0.10 - 0.95 E9/L    Eosinophils Absolute 0.17 0.05 - 0.50 E9/L    Basophils Absolute 0.03 0.00 - 0.20 E9/L   Comprehensive Metabolic Panel   Result Value Ref Range    Sodium 141 132 - 146 mmol/L    Potassium 4.6 3.5 - 5.0 mmol/L    Chloride 102 98 - 107 mmol/L    CO2 29 22 - 29 mmol/L    Anion Gap 10 7 - 16 mmol/L    Glucose 98 74 - 99 mg/dL    BUN 36 (H) 6 - 23 mg/dL    Creatinine 1.5 (H) 0.5 - 1.0 mg/dL    Est, Glom Filt Rate 35 >=60 mL/min/1.73    Calcium 9.0 8.6 - 10.2 mg/dL    Total Protein 8.6 (H) 6.4 - 8.3 g/dL    Albumin 4.0 3.5 - 5.2 g/dL    Total Bilirubin <0.2 0.0 - 1.2 mg/dL    Alkaline Phosphatase 145 (H) 35 - 104 U/L    ALT 20 0 - 32 U/L    AST 26 0 - 31 U/L   Troponin   Result Value Ref Range    Troponin, High Sensitivity 34 (H) 0 - 9 ng/L   Troponin   Result Value Ref Range    Troponin, High Sensitivity 33 (H) 0 - 9 ng/L   EKG 12 Lead   Result Value Ref Range    Ventricular Rate 75 BPM    Atrial Rate 75 BPM    P-R Interval 162 ms    QRS Duration 74 ms    Q-T Interval 410 ms    QTc Calculation (Bazett) 457 ms    P Axis 83 degrees    R Axis 14 degrees    T Axis 29 degrees       RADIOLOGY:  Interpreted by Radiologist.  CT HEAD WO CONTRAST   Final Result   Stable appearing CT scan of the head without acute intracranial abnormality. Generalized age-related cortical atrophy, with intracranial atherosclerosis   and CT findings suggestive of chronic microvascular ischemic change. .         XR CHEST PORTABLE   Final Result   No acute process. EKG: This EKG is signed and interpreted by me. Rate: 75  Rhythm: Sinus  Interpretation: no acute changes  Comparison: stable as compared to patient's most recent EKG 12/9/22      ------------------------- NURSING NOTES AND VITALS REVIEWED ---------------------------   The nursing notes within the ED encounter and vital signs as below have been reviewed by myself. BP (!) 153/55   Pulse 84   Temp 99 °F (37.2 °C) (Oral)   Resp 18   SpO2 97%   Oxygen Saturation Interpretation: Normal    The patients available past medical records and past encounters were reviewed. ------------------------------ ED COURSE/MEDICAL DECISION MAKING----------------------  Medications   acetaminophen (TYLENOL) tablet 650 mg (650 mg Oral Given 2/12/23 2306)   0.9 % sodium chloride bolus (0 mLs IntraVENous Stopped 2/13/23 0249)   fentaNYL (SUBLIMAZE) injection 25 mcg (25 mcg IntraVENous Given 2/13/23 0046)   ketorolac (TORADOL) injection 15 mg (15 mg IntraVENous Given 2/13/23 0241)             Medical Decision Making:      History From: Patient with history of seizure disorder, hyperlipidemia as well as DVT presenting here because of seizure patient reportedly had seizure earlier in the day as well as short time prior to arrival.  Patient reportedly has been compliant with her medications. Patient reporting no chest pain difficulty breathing or abdominal pain. Patient reporting no fall. Patient does report frontal headache. Patient reporting no photophobia. CC/HPI Summary, DDx, ED Course, Reassessment, Tests Considered, Patient expectation:   Patient with history of seizure disorder history of DVT history of autism presenting here because of seizure. Patient reportedly had seizure earlier in the day as well as late this evening. Patient reporting no chest pain no difficulty breathing or abdominal pain. Patient reporting no fall.   Patient is awake alert orient x3 heart lung exam normal abdomen soft nontender. Patient moving all extremities. Patient does have edema bilaterally. Patient differential includes breakthrough seizure as well as subarachnoid hemorrhage as well as    EKG is ordered to have documentation of patient's current rhythm, and to rule out any obvious acute cardiac illnesses such as ACS. Additionally, QT interval may be of use in decision making regarding any medications administered here in the ED. Patient is placed on cardiac monitor and continuous pulse ox for monitoring. CBC is ordered to evaluate for any signs of infection or inflammation by obtaining a WBC count, or any signs of acute anemia by interpreting hemoglobin. CMP was ordered to evaluate for any electrolyte imbalances, kidney function, or any elevations in anion gap. Troponin ordered to evaluate for possible cardiac etiology of symptoms including but not limited to STEMI or NSTEMI. CT head without contrast was ordered to evaluate for acute or chronic intracranial hemorrhage or masses. Chest x-ray is ordered to evaluate for any possible signs of pneumonia, pleural effusions, cardiomegaly, pneumothorax, atelectasis, rib or sternal abnormalities including fractures. Patient on monitor here in the emergency department. Patient had IV established. Patient has had no seizure activity while here in the emergency room she was complaining of headache she was initially given Tylenol with no improvement given fentanyl and then also Toradol 15 mg IV. Patient vital signs remained stable. Patient neurologically intact and oriented x3. Patient afebrile here. Patient will be discharged back to facility. Patient made aware of results and findings. Patient reporting no chest pain or difficulty breathing I did review her labs her CBC and electrolytes are within normal limits her troponins were in the lower 30 range and unchanged after repeat. EKG sinus rhythm with no acute ST elevation.   CT head shows no intracranial hemorrhage or any acute pathology. Dilantin level within normal limits in normal range it was 11. Social Determinants affecting Dx or Tx: Does not smoke or drink alcohol. Chronic Conditions: Patient has history of asthma history of autism history of cellulitis history of DVT history of hyperlipidemia hypertension as well as seizure disorder    Records Reviewed: Patient was admitted on 12/9/2022 for cellulitis of her left lower extremity. She is also admitted in September 2022 for the same complaint. She was seen here in 2020 for seizure-like activity that was actually in July. Re-Evaluations:           Patient reeval multiple times here in the emergency department. Patient vital signs are stable. Patient CT shows no findings of bleed. Patient medicated for headache. Patient does show improvement. Patient remains neurologically intact she has had no seizure activity here. Patient will be discharged back to nursing home. Consultations:                 Critical Care: This patient's ED course included: a personal history and physicial eaxmination    This patient has been closely monitored during their ED course. Counseling: The emergency provider has spoken with the patient and discussed todays results, in addition to providing specific details for the plan of care and counseling regarding the diagnosis and prognosis. Questions are answered at this time and they are agreeable with the plan.       --------------------------------- IMPRESSION AND DISPOSITION ---------------------------------    IMPRESSION  1. Breakthrough seizure ([facility] [address]        DISPOSITION  Disposition: Discharged back to nursing home  Patient condition is stable        NOTE: This report was transcribed using voice recognition software.  Every effort was made to ensure accuracy; however, inadvertent computerized transcription errors may be present          Evens Wilson MD  02/13/23 9212       Evens Wilson MD  02/13/23 8289

## 2023-06-01 ENCOUNTER — HOSPITAL ENCOUNTER (EMERGENCY)
Age: 78
Discharge: HOME | End: 2023-06-01
Attending: EMERGENCY MEDICINE
Payer: MEDICARE

## 2023-06-01 VITALS
HEART RATE: 76 BPM | TEMPERATURE: 97.8 F | DIASTOLIC BLOOD PRESSURE: 63 MMHG | BODY MASS INDEX: 19.93 KG/M2 | RESPIRATION RATE: 18 BRPM | SYSTOLIC BLOOD PRESSURE: 116 MMHG | HEIGHT: 61 IN | OXYGEN SATURATION: 94 %

## 2023-06-01 DIAGNOSIS — G40.919 BREAKTHROUGH SEIZURE (HCC): Primary | ICD-10-CM

## 2023-06-01 LAB
ANION GAP SERPL CALCULATED.3IONS-SCNC: 9 MMOL/L (ref 7–16)
BASOPHILS # BLD: 0.08 E9/L (ref 0–0.2)
BASOPHILS NFR BLD: 0.9 % (ref 0–2)
BUN SERPL-MCNC: 34 MG/DL (ref 6–23)
CALCIUM SERPL-MCNC: 8.3 MG/DL (ref 8.6–10.2)
CHLORIDE SERPL-SCNC: 98 MMOL/L (ref 98–107)
CO2 SERPL-SCNC: 28 MMOL/L (ref 22–29)
CREAT SERPL-MCNC: 1.4 MG/DL (ref 0.5–1)
EOSINOPHIL # BLD: 0.79 E9/L (ref 0.05–0.5)
EOSINOPHIL NFR BLD: 8.6 % (ref 0–6)
ERYTHROCYTE [DISTWIDTH] IN BLOOD BY AUTOMATED COUNT: 13.8 FL (ref 11.5–15)
GLUCOSE SERPL-MCNC: 151 MG/DL (ref 74–99)
HCT VFR BLD AUTO: 34.1 % (ref 34–48)
HGB BLD-MCNC: 11.1 G/DL (ref 11.5–15.5)
IMM GRANULOCYTES # BLD: 0.1 E9/L
IMM GRANULOCYTES NFR BLD: 1.1 % (ref 0–5)
LYMPHOCYTES # BLD: 1.91 E9/L (ref 1.5–4)
LYMPHOCYTES NFR BLD: 20.8 % (ref 20–42)
MCH RBC QN AUTO: 31.5 PG (ref 26–35)
MCHC RBC AUTO-ENTMCNC: 32.6 % (ref 32–34.5)
MCV RBC AUTO: 96.9 FL (ref 80–99.9)
MONOCYTES # BLD: 0.75 E9/L (ref 0.1–0.95)
MONOCYTES NFR BLD: 8.2 % (ref 2–12)
NEUTROPHILS # BLD: 5.56 E9/L (ref 1.8–7.3)
NEUTS SEG NFR BLD: 60.4 % (ref 43–80)
PHENYTOIN DOSE: NORMAL MG
PHENYTOIN SERPL-MCNC: 14 UG/ML (ref 10–20)
PLATELET # BLD AUTO: 269 E9/L (ref 130–450)
PMV BLD AUTO: 9.4 FL (ref 7–12)
POTASSIUM SERPL-SCNC: 4.1 MMOL/L (ref 3.5–5)
RBC # BLD AUTO: 3.52 E12/L (ref 3.5–5.5)
SODIUM SERPL-SCNC: 135 MMOL/L (ref 132–146)
WBC # BLD: 9.2 E9/L (ref 4.5–11.5)

## 2023-06-01 PROCEDURE — 6360000002 HC RX W HCPCS: Performed by: EMERGENCY MEDICINE

## 2023-06-01 PROCEDURE — 85025 COMPLETE CBC W/AUTO DIFF WBC: CPT

## 2023-06-01 PROCEDURE — 80185 ASSAY OF PHENYTOIN TOTAL: CPT

## 2023-06-01 PROCEDURE — 80048 BASIC METABOLIC PNL TOTAL CA: CPT

## 2023-06-01 PROCEDURE — 96374 THER/PROPH/DIAG INJ IV PUSH: CPT

## 2023-06-01 PROCEDURE — 99284 EMERGENCY DEPT VISIT MOD MDM: CPT

## 2023-06-01 RX ORDER — LEVETIRACETAM 500 MG/5ML
1000 INJECTION, SOLUTION, CONCENTRATE INTRAVENOUS ONCE
Status: COMPLETED | OUTPATIENT
Start: 2023-06-01 | End: 2023-06-01

## 2023-06-01 RX ADMIN — LEVETIRACETAM 1000 MG: 100 INJECTION, SOLUTION INTRAVENOUS at 01:51

## 2023-06-01 ASSESSMENT — LIFESTYLE VARIABLES: HOW OFTEN DO YOU HAVE A DRINK CONTAINING ALCOHOL: NEVER

## 2023-06-01 ASSESSMENT — PAIN - FUNCTIONAL ASSESSMENT: PAIN_FUNCTIONAL_ASSESSMENT: NONE - DENIES PAIN

## 2023-06-01 NOTE — ED PROVIDER NOTES
pulses. Chest: No chest wall tenderness  GI:  Abdomen Soft, Non tender, Non distended. No rebound, guarding, or rigidity. No pulsatile masses. Musculoskeletal: Moves all extremities x 4. Warm and well perfused, no clubbing, no cyanosis, no edema. Capillary refill <3 seconds  Integument: skin warm and dry. No rashes. Neurologic: GCS 15, no focal deficits, symmetric strength 5/5 in the upper and lower extremities bilaterally  Psychiatric: Normal Affect            DIAGNOSTIC RESULTS   LABS:    Labs Reviewed   CBC WITH AUTO DIFFERENTIAL - Abnormal; Notable for the following components:       Result Value    Hemoglobin 11.1 (*)     Eosinophils % 8.6 (*)     Eosinophils Absolute 0.79 (*)     All other components within normal limits   BASIC METABOLIC PANEL - Abnormal; Notable for the following components:    Glucose 151 (*)     BUN 34 (*)     Creatinine 1.4 (*)     Calcium 8.3 (*)     All other components within normal limits   PHENYTOIN LEVEL, TOTAL       As interpreted by me, the above displayed labs are abnormal. All other labs obtained during this visit were within normal range or not returned as of this dictation. EKG Interpretation  Interpreted by emergency department physician, Sharan Miller DO              RADIOLOGY:   Non-plain film images such as CT, Ultrasound and MRI are read by the radiologist. Plain radiographic images are visualized and preliminarily interpreted by the ED Provider with the below findings:        Interpretation per the Radiologist below, if available at the time of this note:    No orders to display     No results found. No results found.     PROCEDURES   Unless otherwise noted below, none          CRITICAL CARE TIME (.cct)       PAST MEDICAL HISTORY/Chronic Conditions Affecting Care      has a past medical history of Asthma, Autistic disorder, residual state, Cellulitis of left leg (8/7/2017), Chronic major depressive disorder, recurrent episode ([facility] [address] Dermatophytosis

## 2023-09-22 ENCOUNTER — HOSPITAL ENCOUNTER (EMERGENCY)
Age: 78
Discharge: HOME | End: 2023-09-23
Attending: EMERGENCY MEDICINE
Payer: MEDICARE

## 2023-09-22 VITALS
OXYGEN SATURATION: 98 % | RESPIRATION RATE: 20 BRPM | SYSTOLIC BLOOD PRESSURE: 138 MMHG | HEIGHT: 61 IN | TEMPERATURE: 97.4 F | BODY MASS INDEX: 19.83 KG/M2 | DIASTOLIC BLOOD PRESSURE: 53 MMHG | HEART RATE: 62 BPM | WEIGHT: 105 LBS

## 2023-09-22 DIAGNOSIS — G40.919 BREAKTHROUGH SEIZURE (HCC): Primary | ICD-10-CM

## 2023-09-22 DIAGNOSIS — R78.89 DILANTIN LEVEL TOO LOW: ICD-10-CM

## 2023-09-22 LAB
ALBUMIN SERPL-MCNC: 4.1 G/DL (ref 3.5–5.2)
ALP SERPL-CCNC: 126 U/L (ref 35–104)
ALT SERPL-CCNC: 19 U/L (ref 0–32)
ANION GAP SERPL CALCULATED.3IONS-SCNC: 11 MMOL/L (ref 7–16)
AST SERPL-CCNC: 25 U/L (ref 0–31)
BASOPHILS # BLD: 0.02 K/UL (ref 0–0.2)
BASOPHILS NFR BLD: 0 % (ref 0–2)
BILIRUB SERPL-MCNC: 0.2 MG/DL (ref 0–1.2)
BUN SERPL-MCNC: 41 MG/DL (ref 6–23)
CALCIUM SERPL-MCNC: 9.4 MG/DL (ref 8.6–10.2)
CHLORIDE SERPL-SCNC: 99 MMOL/L (ref 98–107)
CO2 SERPL-SCNC: 28 MMOL/L (ref 22–29)
CREAT SERPL-MCNC: 1.3 MG/DL (ref 0.5–1)
DATE LAST DOSE: ABNORMAL
EOSINOPHIL # BLD: 0.16 K/UL (ref 0.05–0.5)
EOSINOPHILS RELATIVE PERCENT: 3 % (ref 0–6)
ERYTHROCYTE [DISTWIDTH] IN BLOOD BY AUTOMATED COUNT: 13.6 % (ref 11.5–15)
GFR SERPL CREATININE-BSD FRML MDRD: 40 ML/MIN/1.73M2
GLUCOSE SERPL-MCNC: 136 MG/DL (ref 74–99)
HCT VFR BLD AUTO: 32.4 % (ref 34–48)
HGB BLD-MCNC: 10.3 G/DL (ref 11.5–15.5)
IMM GRANULOCYTES # BLD AUTO: <0.03 K/UL (ref 0–0.58)
IMM GRANULOCYTES NFR BLD: 0 % (ref 0–5)
LACTATE BLDV-SCNC: 1 MMOL/L (ref 0.5–2.2)
LYMPHOCYTES NFR BLD: 1.55 K/UL (ref 1.5–4)
LYMPHOCYTES RELATIVE PERCENT: 25 % (ref 20–42)
MCH RBC QN AUTO: 31.5 PG (ref 26–35)
MCHC RBC AUTO-ENTMCNC: 31.8 G/DL (ref 32–34.5)
MCV RBC AUTO: 99.1 FL (ref 80–99.9)
MONOCYTES NFR BLD: 0.52 K/UL (ref 0.1–0.95)
MONOCYTES NFR BLD: 8 % (ref 2–12)
NEUTROPHILS NFR BLD: 64 % (ref 43–80)
NEUTS SEG NFR BLD: 3.96 K/UL (ref 1.8–7.3)
PHENYTOIN DOSE: ABNORMAL MG
PHENYTOIN SERPL-MCNC: 9.8 UG/ML (ref 10–20)
PLATELET # BLD AUTO: 257 K/UL (ref 130–450)
PMV BLD AUTO: 9.4 FL (ref 7–12)
POTASSIUM SERPL-SCNC: 4.6 MMOL/L (ref 3.5–5)
PROT SERPL-MCNC: 8.7 G/DL (ref 6.4–8.3)
RBC # BLD AUTO: 3.27 M/UL (ref 3.5–5.5)
SODIUM SERPL-SCNC: 138 MMOL/L (ref 132–146)
TME LAST DOSE: ABNORMAL H
WBC OTHER # BLD: 6.2 K/UL (ref 4.5–11.5)

## 2023-09-22 PROCEDURE — 83605 ASSAY OF LACTIC ACID: CPT

## 2023-09-22 PROCEDURE — 96365 THER/PROPH/DIAG IV INF INIT: CPT

## 2023-09-22 PROCEDURE — 99284 EMERGENCY DEPT VISIT MOD MDM: CPT

## 2023-09-22 PROCEDURE — 85025 COMPLETE CBC W/AUTO DIFF WBC: CPT

## 2023-09-22 PROCEDURE — 80053 COMPREHEN METABOLIC PANEL: CPT

## 2023-09-22 PROCEDURE — 80185 ASSAY OF PHENYTOIN TOTAL: CPT

## 2023-09-22 ASSESSMENT — PAIN - FUNCTIONAL ASSESSMENT: PAIN_FUNCTIONAL_ASSESSMENT: NONE - DENIES PAIN

## 2023-09-23 ENCOUNTER — APPOINTMENT (OUTPATIENT)
Dept: CT IMAGING | Age: 78
End: 2023-09-23
Payer: MEDICARE

## 2023-09-23 PROCEDURE — 96365 THER/PROPH/DIAG IV INF INIT: CPT

## 2023-09-23 PROCEDURE — 93005 ELECTROCARDIOGRAM TRACING: CPT

## 2023-09-23 PROCEDURE — 70450 CT HEAD/BRAIN W/O DYE: CPT

## 2023-09-23 PROCEDURE — 2580000003 HC RX 258

## 2023-09-23 PROCEDURE — 6360000002 HC RX W HCPCS

## 2023-09-23 RX ADMIN — PHENYTOIN SODIUM 500 MG: 50 INJECTION INTRAMUSCULAR; INTRAVENOUS at 03:03

## 2023-09-23 ASSESSMENT — ENCOUNTER SYMPTOMS
CHEST TIGHTNESS: 0
RHINORRHEA: 0
SORE THROAT: 0
NAUSEA: 0
COUGH: 0
SHORTNESS OF BREATH: 0
VOMITING: 0
DIARRHEA: 0
ABDOMINAL PAIN: 0
PHOTOPHOBIA: 0
BACK PAIN: 1

## 2023-09-23 NOTE — ED PROVIDER NOTES
[facility]        Pt Name: Sofy Ulloa  MRN: [id number]  [address] [facility] [address] 1945  Date of evaluation: 9/22/2023  Provider: Stephani Vazquez DO  PCP: Shelby Munoz DO  Note Started: 12:27 AM EDT 9/23/23    CHIEF COMPLAINT       Chief Complaint   Patient presents with    Seizures     Focal seizure x 2, witnessed. Pt post ictal upon EMS arrival, hx epilepsy       HISTORY OF PRESENT ILLNESS: 1 or more Elements   History From: Patient and EMS      Sofy Ulloa is a 78 y.o. female who presents to the emergency department for concerns seizure. Patient has known seizure disorder. Patient is on Dilantin as well as Keppra. Patient had 2 seizures tonight. Patient's seizures lasted approximately 10 minutes and 15 minutes. Patient is on Dilantin at home. Patient was postictal following her seizures. Patient is alert and oriented at baseline upon arrival to the emergency department. Patient is interactive. Patient has no concerns at this time. Patient is concerned with getting home. Review of Systems   Constitutional:  Negative for appetite change, chills, fatigue and fever. HENT:  Negative for congestion, rhinorrhea and sore throat. Eyes:  Negative for photophobia and visual disturbance. Respiratory:  Negative for cough, chest tightness and shortness of breath. Cardiovascular:  Negative for chest pain and palpitations. Gastrointestinal:  Negative for abdominal pain, diarrhea, nausea and vomiting. Endocrine: Negative for polydipsia and polyuria. Musculoskeletal:  Positive for back pain. Negative for neck pain. Skin:  Negative for rash. Neurological:  Positive for seizures. Negative for dizziness and headaches. Nursing Notes were all reviewed and agreed with or any disagreements were addressed in the HPI. REVIEW OF SYSTEMS :      Positives and Pertinent negatives as per HPI.      SURGICAL HISTORY     Past of 9/23/2023  4:10 AM               (Please note that portions of this note were completed with a voice recognition program.  Efforts were made to edit the dictations but occasionally words are mis-transcribed.)    Yola Carnes DO (electronically signed)           Yola Carnes DO  Resident  09/23/23 8008  ATTENDING PROVIDER ATTESTATION:     I have personally performed and/or participated in the history, exam, medical decision making, and procedures and agree with all pertinent clinical information. I have also reviewed and agree with the past medical, family and social history unless otherwise noted. I have discussed this patient in detail with the resident, and provided the instruction and education regarding seizure    My findings/Plan: I was the primary provider for patient. Patient with history of asthma history of autism history of seizure hyperlipidemia hypertension hypothyroidism history of lipidemia osteoarthritis presenting here because of seizure. Patient does have history of seizure disorder on Keppra and Dilantin. Patient reportedly had take her medication as prescribed. Patient reportedly had seizure lasting about 10 to 15 minutes x 2. Patient was reportedly postictal.  Patient arrived here she is awake alert oriented x3 she reports no headache or chest pain or difficulty breathing or abdominal pain. Patient did not fall or injure herself. Patient on arrival is awake alert oriented x3 heart exam normal lungs are clear abdomen is soft nontender. Patient has no significant edema she is able to move all extremities she has no neck or thoracic or lumbar spine tenderness. Patient does not smoke. Patient last evaluated 6/1/2023 for breakthrough seizure. Patient differential includes electro imbalance as well as breakthrough seizure as well as infectious process such as UTI. Marian Jean   Patient had lab work obtained sodium is 138 potassium is 4.6 BUN is 41 creatinine is 1.3 patient's alk phos was

## 2023-09-25 LAB
EKG ATRIAL RATE: 68 BPM
EKG P AXIS: 39 DEGREES
EKG P-R INTERVAL: 150 MS
EKG Q-T INTERVAL: 420 MS
EKG QRS DURATION: 76 MS
EKG QTC CALCULATION (BAZETT): 446 MS
EKG R AXIS: 11 DEGREES
EKG T AXIS: 31 DEGREES
EKG VENTRICULAR RATE: 68 BPM

## 2023-09-25 PROCEDURE — 93010 ELECTROCARDIOGRAM REPORT: CPT | Performed by: INTERNAL MEDICINE

## 2025-01-01 ENCOUNTER — APPOINTMENT (OUTPATIENT)
Dept: CT IMAGING | Age: 80
DRG: 683 | End: 2025-01-01
Payer: MEDICARE

## 2025-01-01 ENCOUNTER — HOSPITAL ENCOUNTER (INPATIENT)
Age: 80
LOS: 1 days | DRG: 683 | End: 2025-03-27
Attending: STUDENT IN AN ORGANIZED HEALTH CARE EDUCATION/TRAINING PROGRAM | Admitting: INTERNAL MEDICINE
Payer: MEDICARE

## 2025-01-01 VITALS
HEIGHT: 61 IN | SYSTOLIC BLOOD PRESSURE: 129 MMHG | DIASTOLIC BLOOD PRESSURE: 44 MMHG | TEMPERATURE: 97.6 F | OXYGEN SATURATION: 95 % | BODY MASS INDEX: 22.66 KG/M2 | WEIGHT: 120 LBS

## 2025-01-01 DIAGNOSIS — D72.829 LEUKOCYTOSIS, UNSPECIFIED TYPE: ICD-10-CM

## 2025-01-01 DIAGNOSIS — N17.9 AKI (ACUTE KIDNEY INJURY): ICD-10-CM

## 2025-01-01 DIAGNOSIS — L03.90 CELLULITIS, UNSPECIFIED CELLULITIS SITE: ICD-10-CM

## 2025-01-01 DIAGNOSIS — R41.82 ALTERED MENTAL STATUS, UNSPECIFIED ALTERED MENTAL STATUS TYPE: Primary | ICD-10-CM

## 2025-01-01 DIAGNOSIS — J02.9 ACUTE PHARYNGITIS, UNSPECIFIED ETIOLOGY: ICD-10-CM

## 2025-01-01 LAB
ANION GAP SERPL CALCULATED.3IONS-SCNC: 18 MMOL/L (ref 7–16)
BACTERIA URNS QL MICRO: ABNORMAL
BASOPHILS # BLD: 0 K/UL (ref 0–0.2)
BASOPHILS NFR BLD: 0 % (ref 0–2)
BILIRUB UR QL STRIP: NEGATIVE
BUN SERPL-MCNC: 76 MG/DL (ref 6–23)
CALCIUM SERPL-MCNC: 9 MG/DL (ref 8.6–10.2)
CHLORIDE SERPL-SCNC: 94 MMOL/L (ref 98–107)
CLARITY UR: ABNORMAL
CO2 SERPL-SCNC: 21 MMOL/L (ref 22–29)
COLOR UR: YELLOW
CREAT SERPL-MCNC: 2.6 MG/DL (ref 0.5–1)
EOSINOPHIL # BLD: 0 K/UL (ref 0.05–0.5)
EOSINOPHILS RELATIVE PERCENT: 0 % (ref 0–6)
EPI CELLS #/AREA URNS HPF: ABNORMAL /HPF
ERYTHROCYTE [DISTWIDTH] IN BLOOD BY AUTOMATED COUNT: 13.9 % (ref 11.5–15)
FLUAV RNA RESP QL NAA+PROBE: NOT DETECTED
FLUBV RNA RESP QL NAA+PROBE: NOT DETECTED
GFR, ESTIMATED: 18 ML/MIN/1.73M2
GLUCOSE SERPL-MCNC: 103 MG/DL (ref 74–99)
GLUCOSE UR STRIP-MCNC: NEGATIVE MG/DL
HCT VFR BLD AUTO: 34.1 % (ref 34–48)
HGB BLD-MCNC: 11.2 G/DL (ref 11.5–15.5)
HGB UR QL STRIP.AUTO: ABNORMAL
KETONES UR STRIP-MCNC: NEGATIVE MG/DL
LACTATE BLDV-SCNC: 1.4 MMOL/L (ref 0.5–1.9)
LEUKOCYTE ESTERASE UR QL STRIP: ABNORMAL
LYMPHOCYTES NFR BLD: 0.35 K/UL (ref 1.5–4)
LYMPHOCYTES RELATIVE PERCENT: 2 % (ref 20–42)
MCH RBC QN AUTO: 32.7 PG (ref 26–35)
MCHC RBC AUTO-ENTMCNC: 32.8 G/DL (ref 32–34.5)
MCV RBC AUTO: 99.7 FL (ref 80–99.9)
MONOCYTES NFR BLD: 0.17 K/UL (ref 0.1–0.95)
MONOCYTES NFR BLD: 1 % (ref 2–12)
NEUTROPHILS NFR BLD: 97 % (ref 43–80)
NEUTS SEG NFR BLD: 19.38 K/UL (ref 1.8–7.3)
NITRITE UR QL STRIP: POSITIVE
PH UR STRIP: 7 [PH] (ref 5–8)
PLATELET CONFIRMATION: NORMAL
PLATELET, FLUORESCENCE: 223 K/UL (ref 130–450)
PMV BLD AUTO: 10.2 FL (ref 7–12)
POTASSIUM SERPL-SCNC: 4.7 MMOL/L (ref 3.5–5)
PROCALCITONIN SERPL-MCNC: 5.35 NG/ML (ref 0–0.08)
PROT UR STRIP-MCNC: 30 MG/DL
RBC # BLD AUTO: 3.42 M/UL (ref 3.5–5.5)
RBC # BLD: ABNORMAL 10*6/UL
RBC #/AREA URNS HPF: ABNORMAL /HPF
SARS-COV-2 RNA RESP QL NAA+PROBE: NOT DETECTED
SODIUM SERPL-SCNC: 133 MMOL/L (ref 132–146)
SOURCE: NORMAL
SP GR UR STRIP: 1.01 (ref 1–1.03)
SPECIMEN DESCRIPTION: NORMAL
SPECIMEN SOURCE: NORMAL
STREP A, MOLECULAR: NEGATIVE
UROBILINOGEN UR STRIP-ACNC: 0.2 EU/DL (ref 0–1)
WBC #/AREA URNS HPF: ABNORMAL /HPF
WBC OTHER # BLD: 19.9 K/UL (ref 4.5–11.5)

## 2025-01-01 PROCEDURE — 2580000003 HC RX 258: Performed by: STUDENT IN AN ORGANIZED HEALTH CARE EDUCATION/TRAINING PROGRAM

## 2025-01-01 PROCEDURE — 1200000000 HC SEMI PRIVATE

## 2025-01-01 PROCEDURE — 99285 EMERGENCY DEPT VISIT HI MDM: CPT

## 2025-01-01 PROCEDURE — 87636 SARSCOV2 & INF A&B AMP PRB: CPT

## 2025-01-01 PROCEDURE — 2580000003 HC RX 258: Performed by: NURSE PRACTITIONER

## 2025-01-01 PROCEDURE — 87086 URINE CULTURE/COLONY COUNT: CPT

## 2025-01-01 PROCEDURE — 6360000002 HC RX W HCPCS: Performed by: STUDENT IN AN ORGANIZED HEALTH CARE EDUCATION/TRAINING PROGRAM

## 2025-01-01 PROCEDURE — 74176 CT ABD & PELVIS W/O CONTRAST: CPT

## 2025-01-01 PROCEDURE — 70490 CT SOFT TISSUE NECK W/O DYE: CPT

## 2025-01-01 PROCEDURE — 87651 STREP A DNA AMP PROBE: CPT

## 2025-01-01 PROCEDURE — 83605 ASSAY OF LACTIC ACID: CPT

## 2025-01-01 PROCEDURE — 96374 THER/PROPH/DIAG INJ IV PUSH: CPT

## 2025-01-01 PROCEDURE — 70450 CT HEAD/BRAIN W/O DYE: CPT

## 2025-01-01 PROCEDURE — 6360000002 HC RX W HCPCS

## 2025-01-01 PROCEDURE — 6360000002 HC RX W HCPCS: Performed by: INTERNAL MEDICINE

## 2025-01-01 PROCEDURE — 6370000000 HC RX 637 (ALT 250 FOR IP): Performed by: STUDENT IN AN ORGANIZED HEALTH CARE EDUCATION/TRAINING PROGRAM

## 2025-01-01 PROCEDURE — 93005 ELECTROCARDIOGRAM TRACING: CPT

## 2025-01-01 PROCEDURE — 85025 COMPLETE CBC W/AUTO DIFF WBC: CPT

## 2025-01-01 PROCEDURE — 81001 URINALYSIS AUTO W/SCOPE: CPT

## 2025-01-01 PROCEDURE — 2500000003 HC RX 250 WO HCPCS

## 2025-01-01 PROCEDURE — 96372 THER/PROPH/DIAG INJ SC/IM: CPT

## 2025-01-01 PROCEDURE — 96361 HYDRATE IV INFUSION ADD-ON: CPT

## 2025-01-01 PROCEDURE — 84145 PROCALCITONIN (PCT): CPT

## 2025-01-01 PROCEDURE — 80048 BASIC METABOLIC PNL TOTAL CA: CPT

## 2025-01-01 PROCEDURE — 87040 BLOOD CULTURE FOR BACTERIA: CPT

## 2025-01-01 RX ORDER — MIDAZOLAM HYDROCHLORIDE 2 MG/2ML
1 INJECTION, SOLUTION INTRAMUSCULAR; INTRAVENOUS ONCE
Status: COMPLETED | OUTPATIENT
Start: 2025-01-01 | End: 2025-01-01

## 2025-01-01 RX ORDER — EPINEPHRINE IN SOD CHLOR,ISO 1 MG/10 ML
SYRINGE (ML) INTRAVENOUS DAILY PRN
Status: COMPLETED | OUTPATIENT
Start: 2025-01-01 | End: 2025-01-01

## 2025-01-01 RX ORDER — LIDOCAINE HYDROCHLORIDE 20 MG/ML
15 SOLUTION OROPHARYNGEAL ONCE
Status: COMPLETED | OUTPATIENT
Start: 2025-01-01 | End: 2025-01-01

## 2025-01-01 RX ORDER — BUMETANIDE 1 MG/1
2 TABLET ORAL DAILY
Status: DISCONTINUED | OUTPATIENT
Start: 2025-01-01 | End: 2025-01-01 | Stop reason: HOSPADM

## 2025-01-01 RX ORDER — OXYBUTYNIN CHLORIDE 5 MG/1
2.5 TABLET ORAL 2 TIMES DAILY
Status: DISCONTINUED | OUTPATIENT
Start: 2025-01-01 | End: 2025-01-01 | Stop reason: HOSPADM

## 2025-01-01 RX ORDER — SENNOSIDES A AND B 8.6 MG/1
2 TABLET, FILM COATED ORAL NIGHTLY
Status: DISCONTINUED | OUTPATIENT
Start: 2025-01-01 | End: 2025-01-01 | Stop reason: HOSPADM

## 2025-01-01 RX ORDER — CARVEDILOL 6.25 MG/1
25 TABLET ORAL 2 TIMES DAILY
Status: DISCONTINUED | OUTPATIENT
Start: 2025-01-01 | End: 2025-01-01 | Stop reason: HOSPADM

## 2025-01-01 RX ORDER — DULOXETIN HYDROCHLORIDE 60 MG/1
60 CAPSULE, DELAYED RELEASE ORAL DAILY
Status: DISCONTINUED | OUTPATIENT
Start: 2025-01-01 | End: 2025-01-01 | Stop reason: HOSPADM

## 2025-01-01 RX ORDER — ONDANSETRON 4 MG/1
4 TABLET, ORALLY DISINTEGRATING ORAL EVERY 8 HOURS PRN
Status: DISCONTINUED | OUTPATIENT
Start: 2025-01-01 | End: 2025-01-01 | Stop reason: HOSPADM

## 2025-01-01 RX ORDER — HYDRALAZINE HYDROCHLORIDE 25 MG/1
25 TABLET, FILM COATED ORAL 2 TIMES DAILY
Status: DISCONTINUED | OUTPATIENT
Start: 2025-01-01 | End: 2025-01-01 | Stop reason: HOSPADM

## 2025-01-01 RX ORDER — 0.9 % SODIUM CHLORIDE 0.9 %
500 INTRAVENOUS SOLUTION INTRAVENOUS ONCE
Status: COMPLETED | OUTPATIENT
Start: 2025-01-01 | End: 2025-01-01

## 2025-01-01 RX ORDER — ONDANSETRON 2 MG/ML
4 INJECTION INTRAMUSCULAR; INTRAVENOUS EVERY 6 HOURS PRN
Status: DISCONTINUED | OUTPATIENT
Start: 2025-01-01 | End: 2025-01-01 | Stop reason: HOSPADM

## 2025-01-01 RX ORDER — FAMOTIDINE 20 MG/1
10 TABLET, FILM COATED ORAL EVERY OTHER DAY
Status: DISCONTINUED | OUTPATIENT
Start: 2025-01-01 | End: 2025-01-01 | Stop reason: HOSPADM

## 2025-01-01 RX ORDER — M-VIT,TX,IRON,MINS/CALC/FOLIC 27MG-0.4MG
1 TABLET ORAL DAILY
Status: DISCONTINUED | OUTPATIENT
Start: 2025-01-01 | End: 2025-01-01 | Stop reason: HOSPADM

## 2025-01-01 RX ORDER — SODIUM CHLORIDE 0.9 % (FLUSH) 0.9 %
5-40 SYRINGE (ML) INJECTION EVERY 12 HOURS SCHEDULED
Status: DISCONTINUED | OUTPATIENT
Start: 2025-01-01 | End: 2025-01-01 | Stop reason: HOSPADM

## 2025-01-01 RX ORDER — SODIUM CHLORIDE 9 MG/ML
INJECTION, SOLUTION INTRAVENOUS CONTINUOUS
Status: DISCONTINUED | OUTPATIENT
Start: 2025-01-01 | End: 2025-01-01 | Stop reason: HOSPADM

## 2025-01-01 RX ORDER — ARIPIPRAZOLE 2 MG/1
2 TABLET ORAL NIGHTLY
Status: DISCONTINUED | OUTPATIENT
Start: 2025-01-01 | End: 2025-01-01 | Stop reason: HOSPADM

## 2025-01-01 RX ORDER — SODIUM CHLORIDE 9 MG/ML
INJECTION, SOLUTION INTRAVENOUS PRN
Status: DISCONTINUED | OUTPATIENT
Start: 2025-01-01 | End: 2025-01-01 | Stop reason: HOSPADM

## 2025-01-01 RX ORDER — DROPERIDOL 2.5 MG/ML
1.25 INJECTION, SOLUTION INTRAMUSCULAR; INTRAVENOUS ONCE
Status: COMPLETED | OUTPATIENT
Start: 2025-01-01 | End: 2025-01-01

## 2025-01-01 RX ORDER — POLYETHYLENE GLYCOL 3350 17 G/17G
17 POWDER, FOR SOLUTION ORAL DAILY PRN
Status: DISCONTINUED | OUTPATIENT
Start: 2025-01-01 | End: 2025-01-01 | Stop reason: HOSPADM

## 2025-01-01 RX ORDER — MONTELUKAST SODIUM 10 MG/1
10 TABLET ORAL NIGHTLY
Status: DISCONTINUED | OUTPATIENT
Start: 2025-01-01 | End: 2025-01-01 | Stop reason: HOSPADM

## 2025-01-01 RX ORDER — ALBUTEROL SULFATE 0.83 MG/ML
2.5 SOLUTION RESPIRATORY (INHALATION) EVERY 4 HOURS PRN
Status: DISCONTINUED | OUTPATIENT
Start: 2025-01-01 | End: 2025-01-01 | Stop reason: HOSPADM

## 2025-01-01 RX ORDER — ATORVASTATIN CALCIUM 10 MG/1
10 TABLET, FILM COATED ORAL NIGHTLY
Status: DISCONTINUED | OUTPATIENT
Start: 2025-01-01 | End: 2025-01-01 | Stop reason: HOSPADM

## 2025-01-01 RX ORDER — SODIUM CHLORIDE 0.9 % (FLUSH) 0.9 %
5-40 SYRINGE (ML) INJECTION PRN
Status: DISCONTINUED | OUTPATIENT
Start: 2025-01-01 | End: 2025-01-01 | Stop reason: HOSPADM

## 2025-01-01 RX ADMIN — LIDOCAINE HYDROCHLORIDE 15 ML: 20 SOLUTION ORAL at 20:33

## 2025-01-01 RX ADMIN — SODIUM CHLORIDE 500 ML: 0.9 INJECTION, SOLUTION INTRAVENOUS at 01:57

## 2025-01-01 RX ADMIN — DROPERIDOL 1.25 MG: 2.5 INJECTION, SOLUTION INTRAMUSCULAR; INTRAVENOUS at 23:18

## 2025-01-01 RX ADMIN — SODIUM CHLORIDE: 0.9 INJECTION, SOLUTION INTRAVENOUS at 04:53

## 2025-01-01 RX ADMIN — SODIUM CHLORIDE 500 ML: 9 INJECTION, SOLUTION INTRAVENOUS at 21:35

## 2025-01-01 RX ADMIN — MIDAZOLAM HYDROCHLORIDE 1 MG: 1 INJECTION, SOLUTION INTRAMUSCULAR; INTRAVENOUS at 01:54

## 2025-01-01 RX ADMIN — Medication 1 MG: at 05:13

## 2025-01-01 RX ADMIN — MEROPENEM 1000 MG: 1 INJECTION INTRAVENOUS at 01:59

## 2025-01-01 RX ADMIN — MIDAZOLAM HYDROCHLORIDE 1 MG: 1 INJECTION, SOLUTION INTRAMUSCULAR; INTRAVENOUS at 23:39

## 2025-01-01 ASSESSMENT — PAIN DESCRIPTION - LOCATION: LOCATION: THROAT

## 2025-01-01 ASSESSMENT — LIFESTYLE VARIABLES
HOW MANY STANDARD DRINKS CONTAINING ALCOHOL DO YOU HAVE ON A TYPICAL DAY: PATIENT DOES NOT DRINK
HOW OFTEN DO YOU HAVE A DRINK CONTAINING ALCOHOL: NEVER

## 2025-01-01 ASSESSMENT — PAIN - FUNCTIONAL ASSESSMENT: PAIN_FUNCTIONAL_ASSESSMENT: 0-10

## 2025-01-01 ASSESSMENT — PAIN SCALES - GENERAL: PAINLEVEL_OUTOF10: 6

## 2025-01-01 ASSESSMENT — PAIN DESCRIPTION - DESCRIPTORS: DESCRIPTORS: SORE

## 2025-03-27 PROBLEM — L03.90 CELLULITIS: Status: ACTIVE | Noted: 2025-01-01

## 2025-03-27 PROBLEM — N18.9 ACUTE KIDNEY INJURY SUPERIMPOSED ON CKD: Status: ACTIVE | Noted: 2025-01-01

## 2025-03-27 PROBLEM — R65.10 SIRS (SYSTEMIC INFLAMMATORY RESPONSE SYNDROME) (HCC): Status: ACTIVE | Noted: 2025-01-01

## 2025-03-27 PROBLEM — N17.9 ACUTE KIDNEY INJURY SUPERIMPOSED ON CKD: Status: ACTIVE | Noted: 2025-01-01

## 2025-03-27 NOTE — CARE COORDINATION
Social Work/ Transition of Care:    MIRIAM received call from Arthur at [facility] confirming pt's  plans are with Hussein Orozco ([phone]) and they will need notified as soon as possible as pt is [employer] and will need buried before  on 3/28.  MIRIAM gave information to FELIPE Spicer clinical manager.

## 2025-03-27 NOTE — PROGRESS NOTES
Antibiotic Extended Infusion Policy     This patient is on medication that requires renal, weight, and/or indication dose adjustment.      Date Body Weight IBW  Adjusted BW SCr  CrCl Dialysis status BMI   3/27/2025 54.4 kg (120 lb) Ideal body weight: 47.8 kg (105 lb 6.1 oz)  Adjusted ideal body weight: 50.5 kg (111 lb 3.7 oz) Serum creatinine: 2.6 mg/dL (H) 03/26/25 2020  Estimated creatinine clearance: 13 mL/min (A) N/a Body mass index is 22.67 kg/m².       Pharmacy has dose-adjusted the following medication(s):    Ordered Medication: Meropenem 1000mg q12h     Order Changed/converted to: Meropenem 500mg q12h    These changes were made per protocol according to the [facility]   Automatic Extended Infusion Dose Adjustment Policy.     *Please note this dose may need readjusted if patient's condition changes.    Please contact pharmacy with any questions regarding these changes.    Bharti Kirk RPH  3/27/2025  4:51 AM

## 2025-03-27 NOTE — ED NOTES
RN for [facility] stated there were two  homes listed, [facility]  home and [facility].

## 2025-03-27 NOTE — ED PROVIDER NOTES
Sylvia Soares is an 79-year-old female presented to emergency department nursing home she has been yelling that she has a sore throat and has been yelling help me.  Patient has been at a nursing home facility since she was around 48 years old.  Patient's next relative is Shankar listed in the chart.  Patient is a poor historian she is alert and oriented to herself and to place.  Not to time or situation.  Patient has been yelling help me has been reporting symptoms of pharyngitis.  Patient has not had nausea vomiting abdominal pain was sent in from facility for evaluation    The history is provided by the patient and medical records.        Review of Systems   Unable to perform ROS: Other        Physical Exam  Vitals and nursing note reviewed.   Constitutional:       General: She is not in acute distress.     Appearance: Normal appearance. She is ill-appearing.   HENT:      Head: Normocephalic and atraumatic.      Mouth/Throat:      Mouth: Mucous membranes are dry.      Pharynx: Posterior oropharyngeal erythema present.   Eyes:      General: No scleral icterus.     Conjunctiva/sclera: Conjunctivae normal.      Pupils: Pupils are equal, round, and reactive to light.   Cardiovascular:      Rate and Rhythm: Normal rate and regular rhythm.   Pulmonary:      Effort: Pulmonary effort is normal.      Breath sounds: Normal breath sounds.   Abdominal:      General: Bowel sounds are normal. There is no distension.      Palpations: Abdomen is soft.      Tenderness: There is no abdominal tenderness. There is no guarding or rebound.   Musculoskeletal:      Cervical back: Normal range of motion and neck supple. No rigidity. No muscular tenderness.      Right lower leg: Edema present.      Left lower leg: Edema present.      Comments: 3+ left lower extremity edema with erythema and drainage   Skin:     General: Skin is warm and dry.      Capillary Refill: Capillary refill takes less than 2 seconds.      Coloration: Skin is not  Bilirubin, Urine NEGATIVE NEGATIVE    Ketones, Urine NEGATIVE NEGATIVE mg/dL    Specific Gravity, UA 1.010 1.005 - 1.030    Urine Hgb LARGE (A) NEGATIVE    pH, Urine 7.0 5.0 - 8.0    Protein, UA 30 (A) NEGATIVE mg/dL    Urobilinogen, Urine 0.2 0.0 - 1.0 EU/dL    Nitrite, Urine POSITIVE (A) NEGATIVE    Leukocyte Esterase, Urine TRACE (A) NEGATIVE    WBC, UA 0 TO 5 0 TO 5 /HPF    RBC, UA 51  (A) 0 TO 2 /HPF    Epithelial Cells, UA 0 TO 2 /HPF    Bacteria, UA TRACE (A) None   Procalcitonin   Result Value Ref Range    Procalcitonin 5.35 (H) 0.00 - 0.08 ng/mL   EKG 12 Lead   Result Value Ref Range    Ventricular Rate 81 BPM    Atrial Rate 81 BPM    P-R Interval 178 ms    QRS Duration 72 ms    Q-T Interval 370 ms    QTc Calculation (Bazett) 429 ms    P Axis 79 degrees    R Axis -9 degrees    T Axis 28 degrees       RADIOLOGY:  CT SOFT TISSUE NECK WO CONTRAST   Final Result   Asymmetric thickening of the right false vocal cord. Recommend direct   visualization.         CT HEAD WO CONTRAST   Final Result   No acute intracranial abnormality.         CT ABDOMEN PELVIS WO CONTRAST Additional Contrast? None   Final Result   1. No acute intra-abdominal or pelvic abnormality.   2. Reactive appearing lymphadenopathy along the left retroperitoneum and   pelvic sidewall, with mild associated inflammatory stranding. Enlarged left   inguinal lymph nodes with associated stranding are also noted. Findings are   nonspecific and may be reactive.   3. Small hiatal hernia.   4. Advanced right hip arthrosis.                   ------------------------- NURSING NOTES AND VITALS REVIEWED ---------------------------  Date / Time Roomed:  3/26/2025  7:30 PM  ED Bed Assignment:  40/40    The nursing notes within the ED encounter and vital signs as below have been reviewed.     Patient Vitals for the past 24 hrs:   BP Pulse Resp SpO2   03/27/25 0515 -- (!) 0 (!) 0 --   03/27/25 0500 -- 84 13 --   03/27/25 0445 -- 79 18 --   03/27/25 0430 --

## 2025-03-27 NOTE — ED NOTES
I was called to bedside patient was asystolic and not responding.  Patient pupils were fixed.  CPR was initiated patient was being bagged.  Patient reportedly was admitted for cellulitis as well as kidney injury as well as UTI.  Patient remained pulseless here in the emerged part she had no corneal reflex pupils were nonreactive.  Patient CODE STATUS noted DNR CCA as well as on paperwork it did say DNR CC.  Patient remained pulseless and pronounced at 5:15 AM I did speak to family member Shankar and she was made aware of her death.  Call was placed to admitting physician to notify.     I did speak to  and discussed findings he will sign death certificate.  I did speak to him a little after 7:15 AM.  Dominguez De Los Santos MD  03/27/25 0537       Dominguez De Los Santos MD  03/27/25 0553       Dominguez De Los Santos MD  03/27/25 0718

## 2025-03-27 NOTE — PROGRESS NOTES
Renal Dose Adjustment Policy (Generic)     This patient is on medication that requires renal, weight, and/or indication dose adjustment.      Date Body Weight IBW  Adjusted BW SCr  CrCl Dialysis status   3/27/2025 54.4 kg (120 lb) Ideal body weight: 47.8 kg (105 lb 6.1 oz)  Adjusted ideal body weight: 50.5 kg (111 lb 3.7 oz) Serum creatinine: 2.6 mg/dL (H) 03/26/25 2020  Estimated creatinine clearance: 13 mL/min (A) N/a       Pharmacy has dose-adjusted the following medication(s):    Date Previous Order Adjusted Order   3/27/2025 Famotidine 20mg daily Famotidine 10mg every other day       These changes were made per protocol according to the [facility]   Automatic Renal Dose Adjustment Policy.     *Please note this dose may need readjusted if patient's condition changes.    Please contact pharmacy with any questions regarding these changes.    Bharti Kirk [facility]  3/27/2025  4:49 AM

## 2025-03-27 NOTE — ED NOTES
MARCELLE MendozaA, sister, stated that all pt's arrangements are at [facility] and to contact them for info regarding  home.

## 2025-03-27 NOTE — ED NOTES
Patient screaming out, shouting. This RN entered to check on her, patient states she does not need any help. Pt states \"I can't help it'. Patient still screaming with this RN in the room, patient agitated.

## 2025-03-27 NOTE — ED NOTES
Patient shouting out again, patient declines any needs at this time. This RN was able to verbally deescalate the patient at this time. Patient verbalized understanding.

## 2025-03-28 LAB
MICROORGANISM SPEC CULT: ABNORMAL
MICROORGANISM SPEC CULT: ABNORMAL
SERVICE CMNT-IMP: ABNORMAL
SPECIMEN DESCRIPTION: ABNORMAL

## 2025-03-30 LAB
EKG ATRIAL RATE: 81 BPM
EKG P AXIS: 79 DEGREES
EKG P-R INTERVAL: 178 MS
EKG Q-T INTERVAL: 370 MS
EKG QRS DURATION: 72 MS
EKG QTC CALCULATION (BAZETT): 429 MS
EKG R AXIS: -9 DEGREES
EKG T AXIS: 28 DEGREES
EKG VENTRICULAR RATE: 81 BPM
MICROORGANISM SPEC CULT: NORMAL
MICROORGANISM SPEC CULT: NORMAL
SERVICE CMNT-IMP: NORMAL
SERVICE CMNT-IMP: NORMAL
SPECIMEN DESCRIPTION: NORMAL
SPECIMEN DESCRIPTION: NORMAL

## 2025-04-01 LAB
MICROORGANISM SPEC CULT: NORMAL
MICROORGANISM SPEC CULT: NORMAL
SERVICE CMNT-IMP: NORMAL
SERVICE CMNT-IMP: NORMAL
SPECIMEN DESCRIPTION: NORMAL
SPECIMEN DESCRIPTION: NORMAL

## (undated) DEVICE — OPHTHALMIC CORNEAL/SCLERAL V-LANCE KNIFE 20 GAUGE [1.3MM]: Brand: V-LANCE

## (undated) DEVICE — SINGLE USE MEDICAL DEVICE FOR OPHTHALMIC SURGERY: Brand: SIL. COATED I/A 45 MIL 12/B

## (undated) DEVICE — PACK PROCEDURE SURG SURG CATARACT CUSTOM

## (undated) DEVICE — PAD,EYE,1-5/8X2 5/8,STERILE,LF,1/PK: Brand: MEDLINE

## (undated) DEVICE — SYSTEM FLD MGMT ACT W/ 0.9MM INFUS SL MICROSMOOTH 30DEG ABS

## (undated) DEVICE — MARKER,SKIN,WI/RULER AND LABELS: Brand: MEDLINE

## (undated) DEVICE — SOLUTION IRRIGATION BAL SALT SOLUTION 15 ML STRL BSS

## (undated) DEVICE — THE MONARCH® III "C" CARTRIDGE IS A SINGLE-USE POLYPROPYLENE CARTRIDGE FOR POSTERIOR CHAMBER IOL DELIVERY.: Brand: MONARCH®

## (undated) DEVICE — NEEDLE FLTR 18GA L1.5IN MEM THK5UM BLNT DISP

## (undated) DEVICE — EYE EXTRAS

## (undated) DEVICE — KNIFE OPHTH DIA275MM 45DEG SLT W HNDL SHRP ANG PNT DEL SGL

## (undated) DEVICE — SHIELD EYE W3XL2.5IN UNIV CLR PLAS LTWT

## (undated) DEVICE — GLASSES SAFETY PROTCT GRN

## (undated) DEVICE — TAPE ADH W1INXL10YD WHT PAPR GENTLE BRTH FLX COMFORTABLE

## (undated) DEVICE — SOLUTION IV IRRIG WATER 1000ML POUR BRL 2F7114

## (undated) DEVICE — STRIP,CLOSURE,WOUND,MEDI-STRIP,1/4X3: Brand: MEDLINE

## (undated) DEVICE — Device: Brand: ALLEGRO 1X SILICONE I/A HANDPIECE (6)

## (undated) DEVICE — CANNULA IRRIGATION DOME 45 DEG 27 GAX7/8 IN SMOOTH RYCROFT

## (undated) DEVICE — SOLUTION IRRIG BSS ST 500ML

## (undated) DEVICE — MICROSURGICAL INSTRUMENT ANTERIOR CHAMBER CANNULA 30GA: Brand: ALCON